# Patient Record
Sex: FEMALE | Race: WHITE | NOT HISPANIC OR LATINO | Employment: OTHER | URBAN - METROPOLITAN AREA
[De-identification: names, ages, dates, MRNs, and addresses within clinical notes are randomized per-mention and may not be internally consistent; named-entity substitution may affect disease eponyms.]

---

## 2017-04-13 ENCOUNTER — TELEPHONE (OUTPATIENT)
Dept: CARDIOLOGY | Facility: CLINIC | Age: 67
End: 2017-04-13

## 2017-04-13 DIAGNOSIS — E78.5 HYPERLIPIDEMIA LDL GOAL <100: Primary | ICD-10-CM

## 2017-04-13 NOTE — TELEPHONE ENCOUNTER
Pt called into clinic to make sure lab orders were in for her to get her f/u labs drawn after switching to Lipitor in .    Orders entered. Pt will call clinic to schedule labs at her convenience.    Fran Rios RN

## 2017-04-19 ENCOUNTER — DOCUMENTATION ONLY (OUTPATIENT)
Dept: LAB | Facility: CLINIC | Age: 67
End: 2017-04-19

## 2017-04-19 DIAGNOSIS — E78.5 HYPERLIPIDEMIA WITH TARGET LDL LESS THAN 100: Primary | ICD-10-CM

## 2017-04-19 DIAGNOSIS — E78.5 HYPERLIPIDEMIA WITH TARGET LDL LESS THAN 100: ICD-10-CM

## 2017-04-19 DIAGNOSIS — I10 HYPERTENSION GOAL BP (BLOOD PRESSURE) < 140/90: ICD-10-CM

## 2017-04-19 DIAGNOSIS — E78.5 HYPERLIPIDEMIA LDL GOAL <100: ICD-10-CM

## 2017-04-19 LAB
ALBUMIN SERPL-MCNC: 3.5 G/DL (ref 3.4–5)
ALP SERPL-CCNC: 156 U/L (ref 40–150)
ALT SERPL W P-5'-P-CCNC: 22 U/L (ref 0–50)
ANION GAP SERPL CALCULATED.3IONS-SCNC: 8 MMOL/L (ref 3–14)
AST SERPL W P-5'-P-CCNC: 16 U/L (ref 0–45)
BILIRUB DIRECT SERPL-MCNC: 0.1 MG/DL (ref 0–0.2)
BILIRUB SERPL-MCNC: 0.6 MG/DL (ref 0.2–1.3)
BUN SERPL-MCNC: 23 MG/DL (ref 7–30)
CALCIUM SERPL-MCNC: 8.9 MG/DL (ref 8.5–10.1)
CHLORIDE SERPL-SCNC: 106 MMOL/L (ref 94–109)
CHOLEST SERPL-MCNC: 144 MG/DL
CO2 SERPL-SCNC: 27 MMOL/L (ref 20–32)
CREAT SERPL-MCNC: 0.9 MG/DL (ref 0.52–1.04)
GFR SERPL CREATININE-BSD FRML MDRD: 63 ML/MIN/1.7M2
GLUCOSE SERPL-MCNC: 87 MG/DL (ref 70–99)
HDLC SERPL-MCNC: 63 MG/DL
LDLC SERPL CALC-MCNC: 66 MG/DL
NONHDLC SERPL-MCNC: 81 MG/DL
POTASSIUM SERPL-SCNC: 4 MMOL/L (ref 3.4–5.3)
PROT SERPL-MCNC: 7.3 G/DL (ref 6.8–8.8)
SODIUM SERPL-SCNC: 141 MMOL/L (ref 133–144)
TRIGL SERPL-MCNC: 77 MG/DL

## 2017-04-19 PROCEDURE — 36415 COLL VENOUS BLD VENIPUNCTURE: CPT | Performed by: INTERNAL MEDICINE

## 2017-04-19 PROCEDURE — 80076 HEPATIC FUNCTION PANEL: CPT | Performed by: INTERNAL MEDICINE

## 2017-04-19 PROCEDURE — 80048 BASIC METABOLIC PNL TOTAL CA: CPT | Performed by: INTERNAL MEDICINE

## 2017-04-19 PROCEDURE — 80061 LIPID PANEL: CPT | Performed by: INTERNAL MEDICINE

## 2017-04-19 NOTE — PROGRESS NOTES
Patient was in for a lab draw and stated that she also needed kidney functions and liver functions performed.  Only a Lipid Panel order was in her chart.  If patient needs these other tests, please place future orders.   Thank you.

## 2017-04-26 ENCOUNTER — TELEPHONE (OUTPATIENT)
Dept: CARDIOLOGY | Facility: CLINIC | Age: 67
End: 2017-04-26

## 2017-04-26 NOTE — TELEPHONE ENCOUNTER
Spoke to pt regarding her lab results. Verbalized understanding to keep everything the same per her medications.     Pt verbalized understanding. Pt also had questions regarding her BP. States that it seems to be erratic, somedays it would be normal, somedays low and other days as high as 170.     Fran Rios RN

## 2017-05-01 ENCOUNTER — OFFICE VISIT (OUTPATIENT)
Dept: FAMILY MEDICINE | Facility: CLINIC | Age: 67
End: 2017-05-01
Payer: COMMERCIAL

## 2017-05-01 VITALS
DIASTOLIC BLOOD PRESSURE: 66 MMHG | RESPIRATION RATE: 18 BRPM | WEIGHT: 196 LBS | TEMPERATURE: 99 F | OXYGEN SATURATION: 97 % | SYSTOLIC BLOOD PRESSURE: 110 MMHG | BODY MASS INDEX: 33.64 KG/M2 | HEART RATE: 96 BPM

## 2017-05-01 DIAGNOSIS — N30.00 ACUTE CYSTITIS WITHOUT HEMATURIA: ICD-10-CM

## 2017-05-01 DIAGNOSIS — R82.90 NONSPECIFIC FINDING ON EXAMINATION OF URINE: ICD-10-CM

## 2017-05-01 DIAGNOSIS — R30.0 DYSURIA: Primary | ICD-10-CM

## 2017-05-01 LAB
ALBUMIN UR-MCNC: NEGATIVE MG/DL
APPEARANCE UR: CLEAR
BACTERIA #/AREA URNS HPF: ABNORMAL /HPF
BILIRUB UR QL STRIP: NEGATIVE
COLOR UR AUTO: YELLOW
GLUCOSE UR STRIP-MCNC: NEGATIVE MG/DL
HGB UR QL STRIP: ABNORMAL
KETONES UR STRIP-MCNC: NEGATIVE MG/DL
LEUKOCYTE ESTERASE UR QL STRIP: ABNORMAL
NITRATE UR QL: NEGATIVE
NON-SQ EPI CELLS #/AREA URNS LPF: ABNORMAL /LPF
PH UR STRIP: 5.5 PH (ref 5–7)
RBC #/AREA URNS AUTO: ABNORMAL /HPF (ref 0–2)
SP GR UR STRIP: 1.01 (ref 1–1.03)
URN SPEC COLLECT METH UR: ABNORMAL
UROBILINOGEN UR STRIP-ACNC: 0.2 EU/DL (ref 0.2–1)
WBC #/AREA URNS AUTO: ABNORMAL /HPF (ref 0–2)

## 2017-05-01 PROCEDURE — 87086 URINE CULTURE/COLONY COUNT: CPT | Performed by: NURSE PRACTITIONER

## 2017-05-01 PROCEDURE — 99213 OFFICE O/P EST LOW 20 MIN: CPT | Performed by: NURSE PRACTITIONER

## 2017-05-01 PROCEDURE — 81001 URINALYSIS AUTO W/SCOPE: CPT | Performed by: NURSE PRACTITIONER

## 2017-05-01 RX ORDER — NITROFURANTOIN 25; 75 MG/1; MG/1
100 CAPSULE ORAL 2 TIMES DAILY
Qty: 14 CAPSULE | Refills: 0 | Status: SHIPPED | OUTPATIENT
Start: 2017-05-01 | End: 2017-09-28

## 2017-05-01 NOTE — NURSING NOTE
"Chief Complaint   Patient presents with     UTI       Initial /66 (BP Location: Left arm, Patient Position: Chair, Cuff Size: Adult Regular)  Pulse 96  Temp 99  F (37.2  C) (Tympanic)  Resp 18  Wt 196 lb (88.9 kg)  SpO2 97%  BMI 33.64 kg/m2 Estimated body mass index is 33.64 kg/(m^2) as calculated from the following:    Height as of 8/4/16: 5' 4\" (1.626 m).    Weight as of this encounter: 196 lb (88.9 kg).  Medication Reconciliation: complete    Health Maintenance that is potentially due pending provider review:  Fall risk, hep c screening, pneumococcal, advanced directive planning    Will discuss with provider, fall risk complete.      "

## 2017-05-01 NOTE — PATIENT INSTRUCTIONS
Urine showed infection   Antibiotic Macrobid twice a day for 1 week   OVER THE COUNTER AZO or pyridium for bladder spasm/pain - turns urine orange  Push fluids   Follow up if not improved after antibiotic  Will culture out urine- make sure antibiotic is the right one        Urinary Tract Infections in Women  Urinary tract infections (UTIs) are most often caused by bacteria (germs). These bacteria enter the urinary tract. The bacteria may come from outside the body. Or they may travel from the skin outside the rectum or vagina into the urethra. Female anatomy makes it easier for bacteria from the bowel to enter a woman s urinary tract, which is the most common source of UTI. This means women develop UTIs more often than men. Pain in or around the urinary tract is a common UTI symptom. But the only way to know for sure if you have a UTI for the health care provider to test your urine. The two tests that may be done are the urinalysis and urine culture.  Types of UTIs    Cystitis: A bladder infection (cystitis) is the most common UTI in women. You may have urgent or frequent urination. You may also have pain, burning when you urinate, and bloody urine.    Urethritis: This is an inflamed urethra, which is the tube that carries urine from the bladder to outside the body. You may have lower stomach or back pain. You may also have urgent or frequent urination.    Pyelonephritis: This is a kidney infection. If not treated, it can be serious and damage your kidneys. In severe cases, you may be hospitalized. You may have a fever and lower back pain.  Medications to treat a UTI  Most UTIs are treated with antibiotics. These kill the bacteria. The length of time you need to take them depends on the type of infection. It may be as short as 3 days. If you have repeated UTIs, a low-dose antibiotic may be needed for several months. Take antibiotics exactly as directed. Don t stop taking them until all of the medication is gone. If  you stop taking the antibiotic too soon, the infection may not go away, and you may develop a resistance to the antibiotic. This can make it much harder to treat.  Lifestyle changes to treat and prevent UTIs  The lifestyle changes below will help get rid of your UTI. They may also help prevent future UTIs.    Drink plenty of fluids. This includes water, juice, or other caffeine-free drinks. Fluids help flush bacteria out of your body.    Empty your bladder. Always empty your bladder when you feel the urge to urinate. And always urinate before going to sleep. Urine that stays in your bladder can lead to infection. Try to urinate before and after sex as well.    Practice good personal hygiene. Wipe yourself from front to back after using the toilet. This helps keep bacteria from getting into the urethra.    Use condoms during sex. These help prevent UTIs caused by sexually transmitted bacteria. Also, avoid using spermicides during sex. These can increase the risk of UTIs. Choose other forms of birth control instead. For women who tend to get UTIs after sex, a low-dose of a preventive antibiotic may be used. Be sure to discuss this option with your health care provider.    Follow up with your health care provider as directed. He or she may test to make sure the infection has cleared. If necessary, additional treatment may be started.    7073-4651 [employer]. [address] [place] [address], [place] [address]. All rights reserved. This information is not intended as a substitute for professional medical care. Always follow your healthcare professional's instructions.      FOLLOW UP AS NEEDED FOR PERSISTENT OR WORSENING SYMPTOMS

## 2017-05-01 NOTE — MR AVS SNAPSHOT
After Visit Summary   5/1/2017    Sharri Elaine    MRN: [id number]           Patient Information     Date Of Birth          1950        Visit Information        Provider Department      5/1/2017 11:00 AM Lisa Martinez NP [facility]        Today's Diagnoses     Dysuria    -  1    Nonspecific finding on examination of urine        Acute cystitis without hematuria          Care Instructions    Urine showed infection   Antibiotic Macrobid twice a day for 1 week   OVER THE COUNTER AZO or pyridium for bladder spasm/pain - turns urine orange  Push fluids   Follow up if not improved after antibiotic  Will culture out urine- make sure antibiotic is the right one        Urinary Tract Infections in Women  Urinary tract infections (UTIs) are most often caused by bacteria (germs). These bacteria enter the urinary tract. The bacteria may come from outside the body. Or they may travel from the skin outside the rectum or vagina into the urethra. Female anatomy makes it easier for bacteria from the bowel to enter a woman s urinary tract, which is the most common source of UTI. This means women develop UTIs more often than men. Pain in or around the urinary tract is a common UTI symptom. But the only way to know for sure if you have a UTI for the health care provider to test your urine. The two tests that may be done are the urinalysis and urine culture.  Types of UTIs    Cystitis: A bladder infection (cystitis) is the most common UTI in women. You may have urgent or frequent urination. You may also have pain, burning when you urinate, and bloody urine.    Urethritis: This is an inflamed urethra, which is the tube that carries urine from the bladder to outside the body. You may have lower stomach or back pain. You may also have urgent or frequent urination.    Pyelonephritis: This is a kidney infection. If not treated, it can be serious and damage your kidneys. In severe cases, you may be  hospitalized. You may have a fever and lower back pain.  Medications to treat a UTI  Most UTIs are treated with antibiotics. These kill the bacteria. The length of time you need to take them depends on the type of infection. It may be as short as 3 days. If you have repeated UTIs, a low-dose antibiotic may be needed for several months. Take antibiotics exactly as directed. Don t stop taking them until all of the medication is gone. If you stop taking the antibiotic too soon, the infection may not go away, and you may develop a resistance to the antibiotic. This can make it much harder to treat.  Lifestyle changes to treat and prevent UTIs  The lifestyle changes below will help get rid of your UTI. They may also help prevent future UTIs.    Drink plenty of fluids. This includes water, juice, or other caffeine-free drinks. Fluids help flush bacteria out of your body.    Empty your bladder. Always empty your bladder when you feel the urge to urinate. And always urinate before going to sleep. Urine that stays in your bladder can lead to infection. Try to urinate before and after sex as well.    Practice good personal hygiene. Wipe yourself from front to back after using the toilet. This helps keep bacteria from getting into the urethra.    Use condoms during sex. These help prevent UTIs caused by sexually transmitted bacteria. Also, avoid using spermicides during sex. These can increase the risk of UTIs. Choose other forms of birth control instead. For women who tend to get UTIs after sex, a low-dose of a preventive antibiotic may be used. Be sure to discuss this option with your health care provider.    Follow up with your health care provider as directed. He or she may test to make sure the infection has cleared. If necessary, additional treatment may be started.    3482-5257 [employer]. [address] [place] [address], [place] [address]. All rights reserved. This information is not intended as a substitute for  professional medical care. Always follow your healthcare professional's instructions.      FOLLOW UP AS NEEDED FOR PERSISTENT OR WORSENING SYMPTOMS         Follow-ups after your visit        Who to contact     If you have questions or need follow up information about today's clinic visit or your schedule please contact [facility] directly at [phone].  Normal or non-critical lab and imaging results will be communicated to you by MyChart, letter or phone within 4 business days after the clinic has received the results. If you do not hear from us within 7 days, please contact the clinic through [employer] or phone. If you have a critical or abnormal lab result, we will notify you by phone as soon as possible.  Submit refill requests through [employer] or call your pharmacy and they will forward the refill request to us. Please allow 3 business days for your refill to be completed.          Additional Information About Your Visit        MyChart Information     [employer] gives you secure access to your electronic health record. If you see a primary care provider, you can also send messages to your care team and make appointments. If you have questions, please call your primary care clinic.  If you do not have a primary care provider, please call [phone] and they will assist you.        Care EveryWhere ID     This is your Care EveryWhere ID. This could be used by other organizations to access your [facility] medical records  [id number]        Your Vitals Were     Pulse Temperature Respirations Pulse Oximetry BMI (Body Mass Index)       96 99  F (37.2  C) (Tympanic) 18 97% 33.64 kg/m2        Blood Pressure from Last 3 Encounters:   05/01/17 110/66   10/19/16 129/62   08/04/16 126/64    Weight from Last 3 Encounters:   05/01/17 196 lb (88.9 kg)   10/19/16 200 lb (90.7 kg)   08/04/16 195 lb 12.8 oz (88.8 kg)              We Performed the Following     *UA reflex to Microscopic and Culture ([place] and  [facility] (except [place] and [place])     Urine Culture Aerobic Bacterial     Urine Microscopic          Today's Medication Changes          These changes are accurate as of: 5/1/17  3:05 PM.  If you have any questions, ask your nurse or doctor.               Start taking these medicines.        Dose/Directions    nitrofurantoin (macrocrystal-monohydrate) 100 MG capsule   Commonly known as:  MACROBID   Used for:  Acute cystitis without hematuria   Started by:  Lisa Martinez NP        Dose:  100 mg   Take 1 capsule (100 mg) by mouth 2 times daily   Quantity:  14 capsule   Refills:  0            Where to get your medicines      These medications were sent to [facility] [id number] [address]     Phone:  [phone]     nitrofurantoin (macrocrystal-monohydrate) 100 MG capsule                Primary Care Provider Office Phone # Fax #    Kervin Michael Lin [phone]       [facility] [address] [facility] [address]        Thank you!     Thank you for choosing [facility]  for your care. Our goal is always to provide you with excellent care. Hearing back from our patients is one way we can continue to improve our services. Please take a few minutes to complete the written survey that you may receive in the mail after your visit with us. Thank you!             Your Updated Medication List - Protect others around you: Learn how to safely use, store and throw away your medicines at www.disposemymeds.org.          This list is accurate as of: 5/1/17  3:05 PM.  Always use your most recent med list.                   Brand Name Dispense Instructions for use    aspirin 81 MG tablet      Take 81 mg by mouth daily       atorvastatin 40 MG tablet    LIPITOR    90 tablet    Take 1 tablet (40 mg) by mouth daily       carvedilol 12.5 MG tablet    COREG    180 tablet    Take 1 tablet  (12.5 mg) by mouth 2 times daily (with meals)       diphenhydrAMINE-acetaminophen  MG tablet    TYLENOL PM     Take 1 tablet by mouth At Bedtime.       losartan 25 MG tablet    COZAAR    180 tablet    Take one pill twice daily       nitrofurantoin (macrocrystal-monohydrate) 100 MG capsule    MACROBID    14 capsule    Take 1 capsule (100 mg) by mouth 2 times daily       nitroglycerin 0.4 MG sublingual tablet    NITROSTAT    25 tablet    Place 1 tablet (0.4 mg) under the tongue every 5 minutes as needed for chest pain       order for DME     1 Device    Blood pressure cuff       ZANTAC PO

## 2017-05-01 NOTE — PROGRESS NOTES
SUBJECTIVE:                                                    Sharri Elaine is a 66 year old female who presents to clinic today for the following health issues:      URINARY TRACT SYMPTOMS      Duration: 3 days    Description  dysuria, back pain and fatigued, fever and chills    Intensity:  moderate    Accompanying signs and symptoms:  Fever/chills: YES  Flank pain YES  Nausea and vomiting: no   Vaginal symptoms: none  Abdominal/Pelvic Pain: YES    History  History of frequent UTI's: no   History of kidney stones: no   Sexually Active: no   Possibility of pregnancy: No    Precipitating or alleviating factors: None    Therapies tried and outcome: took some old pills, does not remember what they were, Cranberry juice   Outcome: The pills did not work, cranberry juice didn't seem to help       Believes that she had a fever on Saturday- did not check it   Had body aches on Thursday   Urinary symptoms started Saturday have continued since     This morning lower back started aching     No history of recurrent UTIs  Has a a few in the past       Problem list and histories reviewed & adjusted, as indicated.  Additional history: as documented    Patient Active Problem List   Diagnosis     Preinfarction syndrome (H)     Hypertension goal BP (blood pressure) < 140/90     Hyperlipidemia with target LDL less than 100     CAD (coronary artery disease)     [facility]     Advanced directives, counseling/discussion     Chest pain     Acute chest pain     Past Surgical History:   Procedure Laterality Date     ANGIOGRAM  6/2010     angiogram  11/3/2010    in stent restenosis     PVD STENTING  2010     x 2, LAD       Social History   Substance Use Topics     Smoking status: Never Smoker     Smokeless tobacco: Never Used      Comment: never smoker; non-smoking household     Alcohol use Yes      Comment: rare     Family History   Problem Relation Age of Onset     Hypertension Mother      HEART DISEASE Mother       CHF     Alzheimer Disease Father      Hypertension Daughter      High cholesterol Daughter      Breast Cancer No family hx of            Reviewed and updated as needed this visit by clinical staff  Tobacco  Allergies  Med Hx  Surg Hx  Fam Hx  Soc Hx      Reviewed and updated as needed this visit by Provider         ROS:  Constitutional, HEENT, cardiovascular, pulmonary, gi and gu systems are negative, except as otherwise noted.    OBJECTIVE:                                                    /66 (BP Location: Left arm, Patient Position: Chair, Cuff Size: Adult Regular)  Pulse 96  Temp 99  F (37.2  C) (Tympanic)  Resp 18  Wt 196 lb (88.9 kg)  SpO2 97%  BMI 33.64 kg/m2  Body mass index is 33.64 kg/(m^2).  GENERAL APPEARANCE: healthy, alert and no distress  HENT: ear canals and TM's normal and nose and mouth without ulcers or lesions  RESP: lungs clear to auscultation - no rales, rhonchi or wheezes  CV: regular rates and rhythm, normal S1 S2, no S3 or S4 and no murmur, click or rub  ABDOMEN: soft, nontender, without hepatosplenomegaly or masses and bowel sounds normal    Diagnostic test results:  Diagnostic Test Results:  Results for orders placed or performed in visit on 05/01/17   *UA reflex to Microscopic and Culture ([place] and [facility] (except [place] and [place])   Result Value Ref [facility]    Color Urine Yellow     Appearance Urine Clear     Glucose Urine Negative NEG mg/dL    Bilirubin Urine Negative NEG    Ketones Urine Negative NEG mg/dL    Specific Gravity Urine 1.010 1.003 - 1.035    Blood Urine Small (A) NEG    pH Urine 5.5 5.0 - 7.0 pH    Protein Albumin Urine Negative NEG mg/dL    Urobilinogen Urine 0.2 0.2 - 1.0 EU/dL    Nitrite Urine Negative NEG    Leukocyte Esterase Urine Moderate (A) NEG    Source Midstream Urine    Urine Microscopic   Result Value Ref [facility]    WBC Urine 25-50 (A) 0 - 2 /HPF    RBC Urine 5-10 (A) 0 - 2 /HPF    Squamous Epithelial /LPF Urine Moderate (A) FEW  /LPF    Bacteria Urine Moderate (A) NEG /HPF        ASSESSMENT/PLAN:                                                    1. Dysuria  - *UA reflex to Microscopic and Culture ([place] and [facility] (except [place] and [place])  - Urine Microscopic    2. Nonspecific finding on examination of urine  - Urine Culture Aerobic Bacterial    3. Acute cystitis without hematuria  - nitrofurantoin, macrocrystal-monohydrate, (MACROBID) 100 MG capsule; Take 1 capsule (100 mg) by mouth 2 times daily  Dispense: 14 capsule; Refill: 0    Patient Instructions   Urine showed infection   Antibiotic Macrobid twice a day for 1 week   OVER THE COUNTER AZO or pyridium for bladder spasm/pain - turns urine orange  Push fluids   Follow up if not improved after antibiotic  Will culture out urine- make sure antibiotic is the right one        Urinary Tract Infections in Women  Urinary tract infections (UTIs) are most often caused by bacteria (germs). These bacteria enter the urinary tract. The bacteria may come from outside the body. Or they may travel from the skin outside the rectum or vagina into the urethra. Female anatomy makes it easier for bacteria from the bowel to enter a woman s urinary tract, which is the most common source of UTI. This means women develop UTIs more often than men. Pain in or around the urinary tract is a common UTI symptom. But the only way to know for sure if you have a UTI for the health care provider to test your urine. The two tests that may be done are the urinalysis and urine culture.  Types of UTIs    Cystitis: A bladder infection (cystitis) is the most common UTI in women. You may have urgent or frequent urination. You may also have pain, burning when you urinate, and bloody urine.    Urethritis: This is an inflamed urethra, which is the tube that carries urine from the bladder to outside the body. You may have lower stomach or back pain. You may also have urgent or frequent  urination.    Pyelonephritis: This is a kidney infection. If not treated, it can be serious and damage your kidneys. In severe cases, you may be hospitalized. You may have a fever and lower back pain.  Medications to treat a UTI  Most UTIs are treated with antibiotics. These kill the bacteria. The length of time you need to take them depends on the type of infection. It may be as short as 3 days. If you have repeated UTIs, a low-dose antibiotic may be needed for several months. Take antibiotics exactly as directed. Don t stop taking them until all of the medication is gone. If you stop taking the antibiotic too soon, the infection may not go away, and you may develop a resistance to the antibiotic. This can make it much harder to treat.  Lifestyle changes to treat and prevent UTIs  The lifestyle changes below will help get rid of your UTI. They may also help prevent future UTIs.    Drink plenty of fluids. This includes water, juice, or other caffeine-free drinks. Fluids help flush bacteria out of your body.    Empty your bladder. Always empty your bladder when you feel the urge to urinate. And always urinate before going to sleep. Urine that stays in your bladder can lead to infection. Try to urinate before and after sex as well.    Practice good personal hygiene. Wipe yourself from front to back after using the toilet. This helps keep bacteria from getting into the urethra.    Use condoms during sex. These help prevent UTIs caused by sexually transmitted bacteria. Also, avoid using spermicides during sex. These can increase the risk of UTIs. Choose other forms of birth control instead. For women who tend to get UTIs after sex, a low-dose of a preventive antibiotic may be used. Be sure to discuss this option with your health care provider.    Follow up with your health care provider as directed. He or she may test to make sure the infection has cleared. If necessary, additional treatment may be started.    7744-7856  [employer]. [address] [place] [address], [place] [address]. All rights reserved. This information is not intended as a substitute for professional medical care. Always follow your healthcare professional's instructions.      FOLLOW UP AS NEEDED FOR PERSISTENT OR WORSENING SYMPTOMS       Lisa Martinez NP  [facility]

## 2017-05-03 ENCOUNTER — TELEPHONE (OUTPATIENT)
Dept: FAMILY MEDICINE | Facility: CLINIC | Age: 67
End: 2017-05-03

## 2017-05-03 DIAGNOSIS — R30.0 DYSURIA: Primary | ICD-10-CM

## 2017-05-03 RX ORDER — CIPROFLOXACIN 500 MG/1
500 TABLET, FILM COATED ORAL 2 TIMES DAILY
Qty: 10 TABLET | Refills: 0 | Status: SHIPPED | OUTPATIENT
Start: 2017-05-03 | End: 2017-05-08

## 2017-05-03 NOTE — TELEPHONE ENCOUNTER
"Pt was in on 5-1-17 for UTI was given Macrobid, UC is not back yet, pt reported today \" I have such bad diarrhea from the Macrobid, so I quit taking it, I need something else \" I did discuss with her that antibiotics are harsh on the GI system and they can cause diarrhea, I did reiterate to the pt, it would be best if we can wait for UC result to assist in determining which antibiotic, if any to change to.  I informed her to also make sure she is drinking plenty of water, will forward to Dr Lin to review and await UC result.  Che Zaidi RN    Patient Instructions   Urine showed infection   Antibiotic Macrobid twice a day for 1 week   OVER THE COUNTER AZO or pyridium for bladder spasm/pain - turns urine orange  Push fluids   Follow up if not improved after antibiotic  Will culture out urine- make sure antibiotic is the right one    "

## 2017-05-03 NOTE — TELEPHONE ENCOUNTER
Patient was seen on Monday for a UTI and was given medication. She is calling today as the medication is NOT helping. She states she was seen in [place] a few years ago and that whatever she got prescribed, worked well. Please advise.    Tina [facility]

## 2017-05-04 NOTE — PROGRESS NOTES
Patient discontinued nitrofurantoin due to side effects. She was treated with ciprofloxacin about one and a half year ago and did not experience any side effects. Ciprofloxacin ordered and suggested to continue well hydration. Urine culture to follow. All questions answered.       Kervin Lin MD  [facility]

## 2017-05-05 LAB
BACTERIA SPEC CULT: NORMAL
MICRO REPORT STATUS: NORMAL
SPECIMEN SOURCE: NORMAL

## 2017-09-27 ENCOUNTER — PRE VISIT (OUTPATIENT)
Dept: CARDIOLOGY | Facility: CLINIC | Age: 67
End: 2017-09-27

## 2017-09-27 NOTE — TELEPHONE ENCOUNTER
Yearly follow up for HTN, CAD, Dyslipidemia. Lipids last checked 4/19/17.    Chart prep complete. All requested testing/labs completed.  Karla Fenton CMA.

## 2017-09-28 ENCOUNTER — OFFICE VISIT (OUTPATIENT)
Dept: CARDIOLOGY | Facility: CLINIC | Age: 67
End: 2017-09-28
Payer: COMMERCIAL

## 2017-09-28 VITALS — HEART RATE: 68 BPM | DIASTOLIC BLOOD PRESSURE: 87 MMHG | OXYGEN SATURATION: 99 % | SYSTOLIC BLOOD PRESSURE: 163 MMHG

## 2017-09-28 DIAGNOSIS — E78.5 HYPERLIPIDEMIA WITH TARGET LDL LESS THAN 100: ICD-10-CM

## 2017-09-28 DIAGNOSIS — Z23 NEED FOR PROPHYLACTIC VACCINATION AND INOCULATION AGAINST INFLUENZA: Primary | ICD-10-CM

## 2017-09-28 PROCEDURE — G0008 ADMIN INFLUENZA VIRUS VAC: HCPCS | Performed by: INTERNAL MEDICINE

## 2017-09-28 PROCEDURE — 90662 IIV NO PRSV INCREASED AG IM: CPT | Performed by: INTERNAL MEDICINE

## 2017-09-28 PROCEDURE — 99214 OFFICE O/P EST MOD 30 MIN: CPT | Mod: 25 | Performed by: INTERNAL MEDICINE

## 2017-09-28 RX ORDER — ATORVASTATIN CALCIUM 40 MG/1
40 TABLET, FILM COATED ORAL DAILY
Qty: 90 TABLET | Refills: 3 | Status: SHIPPED | OUTPATIENT
Start: 2017-09-28 | End: 2018-09-13

## 2017-09-28 RX ORDER — LOSARTAN POTASSIUM 25 MG/1
TABLET ORAL
Qty: 180 TABLET | Refills: 3 | Status: SHIPPED | OUTPATIENT
Start: 2017-09-28 | End: 2018-09-13

## 2017-09-28 RX ORDER — CARVEDILOL 12.5 MG/1
12.5 TABLET ORAL 2 TIMES DAILY WITH MEALS
Qty: 180 TABLET | Refills: 3 | Status: SHIPPED | OUTPATIENT
Start: 2017-09-28 | End: 2018-09-13

## 2017-09-28 RX ORDER — NITROGLYCERIN 0.4 MG/1
0.4 TABLET SUBLINGUAL EVERY 5 MIN PRN
Qty: 25 TABLET | Refills: 1 | Status: SHIPPED | OUTPATIENT
Start: 2017-09-28 | End: 2018-09-13

## 2017-09-28 ASSESSMENT — PAIN SCALES - GENERAL: PAINLEVEL: NO PAIN (0)

## 2017-09-28 NOTE — NURSING NOTE
"Chief Complaint   Patient presents with     RECHECK     Yearly follow up for HTN, CAD, Dyslipidemia. Lipids last checked 4/19/17. Reports still getting a tightness in her chest with stress,  occ dizziness when not drinking enough water, denies any fluttering, sob, or abnormal fatigue.       Initial /87 (BP Location: Left arm, Patient Position: Chair, Cuff Size: Adult Regular)  Pulse 68  SpO2 99% Estimated body mass index is 33.64 kg/(m^2) as calculated from the following:    Height as of 8/4/16: 5' 4\" (1.626 m).    Weight as of 5/1/17: 196 lb (88.9 kg)..  BP completed using cuff size: regular    Karla Fenton CMA    "

## 2017-09-28 NOTE — PROGRESS NOTES
SUBJECTIVE:  Sharri Elaine is a 67 year old female who presents for follow up.  Had pLAD stent at [facility] in 6/2010 and repeat stent for ISR in 10/2010.Since then weekly chest tightness at rest. Takes 1 S/L NTG and pain goes away in 20 minutes or so. No exertional symptoms. Had normal stress MPI for same symptoms in 6.2016. No new symptoms.  Had shingles.    Patient Active Problem List    Diagnosis Date Noted     Health Care Home 06/28/2011     Priority: High     DX V65.8 REPLACED WITH 36718 HEALTH CARE HOME (04/08/2013)       Chest pain 05/12/2016     Priority: Medium     Acute chest pain 05/12/2016     Priority: Medium     Advanced directives, counseling/discussion 05/04/2012     Priority: Medium     Advance Directive received and scanned. Click on Code in the patient header to view. 5/4/2012          Rosacea 08/20/2010     Priority: Medium     Hypertension goal BP (blood pressure) < 140/90 07/14/2010     Priority: Medium     Goal <130/80       Hyperlipidemia with target LDL less than 100 07/14/2010     Priority: Medium     January 4, 2011 - . I have asked this patient to obtain a fasting lipid profile tomorrow in Ghada Bermeo's office. She is complaining of some muscle weakness and/or achiness, especially in her legs. Perhaps simvastatin at 40 mg will need to be changed to something more potent such as Crestor. If she is having myalgias or myopathy, a change to a water soluble statin would be in order.   Diagnosis updated by automated process. Provider to review and confirm.       CAD (coronary artery disease)      Priority: Medium     Status post LAD stenting 06/28 for unstable angina. She had in-stent restenosis in the LAD with further stenting on 11/03/2010 when she developed recurrent angina and was hospitalized at [facility]. Coronary angiography demonstrated a 60% circumflex ostial stenosis, a 95% proximal LAD in-stent restenosis along with a 50% ostial stenosis that was unchanged. The  right coronary artery had a mild 10%-20% luminal narrowing but nothing significant. Her ejection fraction at that time was 45%. The in-stent restenosis was restented with an [place] drug-eluting stent.   Plavix was changed to Effient and the cardiologist, Dr. Torrez, felt that Effient should be continued at least 2 years or preferentially longer term. Nuclear stress test late summer 2011. We will consider a stress test earlier than that should the patient have symptoms.    Follow up with Cardiology in 6 months        Preinfarction syndrome (H)      Priority: Medium     (Problem list name updated by automated process. Provider to review and confirm.)      .  Current Outpatient Prescriptions   Medication Sig     atorvastatin (LIPITOR) 40 MG tablet Take 1 tablet (40 mg) by mouth daily     carvedilol (COREG) 12.5 MG tablet Take 1 tablet (12.5 mg) by mouth 2 times daily (with meals)     losartan (COZAAR) 25 MG tablet Take one pill twice daily     nitroGLYcerin (NITROSTAT) 0.4 MG sublingual tablet Place 1 tablet (0.4 mg) under the tongue every 5 minutes as needed for chest pain     RaNITidine HCl (ZANTAC PO)      aspirin 81 MG tablet Take 81 mg by mouth daily     diphenhydrAMINE-acetaminophen (TYLENOL PM)  MG tablet Take 1 tablet by mouth At Bedtime.     ORDER FOR DME Blood pressure cuff     [DISCONTINUED] atorvastatin (LIPITOR) 40 MG tablet Take 1 tablet (40 mg) by mouth daily     [DISCONTINUED] nitroglycerin (NITROSTAT) 0.4 MG SL tablet Place 1 tablet (0.4 mg) under the tongue every 5 minutes as needed for chest pain     [DISCONTINUED] carvedilol (COREG) 12.5 MG tablet Take 1 tablet (12.5 mg) by mouth 2 times daily (with meals)     [DISCONTINUED] losartan (COZAAR) 25 MG tablet Take one pill twice daily     No current facility-administered medications for this visit.      Past Medical History:   Diagnosis Date     Acne rosacea      Anginas, unstable 2010     CAD (coronary artery disease) 2010    LAD stent x 2      Fibrocystic breast      Hepatitis     age 18     Iritis     14 YEARS OLD     Menopause     AN HRT     MI (myocardial infarction) (H)     non T wave MI     NONSPECIFIC MEDICAL HISTORY 7/14/09    HEART MURMUR- NORMAL ECHO     Shingles 05/2017     Past Surgical History:   Procedure Laterality Date     ANGIOGRAM  6/2010     angiogram  11/3/2010    in stent restenosis     PVD STENTING  2010     x 2, LAD     Allergies   Allergen Reactions     Sulfa Drugs Rash     Social History     Social History     Marital status:      Spouse name: N/A     Number of children: N/A     Years of education: N/A     Occupational History     Not on file.     Social History Main Topics     Smoking status: Never Smoker     Smokeless tobacco: Never Used      Comment: never smoker; non-smoking household     Alcohol use Yes      Comment: rare     Drug use: No      Comment: never     Sexual activity: Yes     Partners: Male     Other Topics Concern     Not on file     Social History Narrative     Family History   Problem Relation Age of Onset     Hypertension Mother      HEART DISEASE Mother      CHF     Alzheimer Disease Father      Hypertension Daughter      High cholesterol Daughter      Breast Cancer No family hx of           REVIEW OF SYSTEMS:  General: negative, fever, chills, night sweats  Skin: negative, acne, rash and scaling  Eyes: negative, double vision, eye pain and photophobia  Ears/Nose/Throat: negative, nasal congestion and purulent rhinorrhea  Respiratory: No dyspnea on exertion, No cough, No hemoptysis and negative  Cardiovascular: negative, palpitations, tachycardia, irregular heart beat, exertional chest pain or pressure, paroxysmal nocturnal dyspnea, dyspnea on exertion and orthopnea       OBJECTIVE:  Blood pressure 163/87, pulse 68, SpO2 99 %, not currently breastfeeding.  General Appearance: alert, active and no distress  Head: Normocephalic. No masses, lesions, tenderness or abnormalities  Eyes: conjuctiva clear,  PERRL, EOM intact  Ears: External ears normal. Canals clear. TM's normal.  Nose: Nares normal  Mouth: normal  Neck: Supple, no cervical adenopathy, no thyromegaly  Lungs: clear to auscultation  Cardiac: regular rate and rhythm, normal S1 and S2, no murmur         ASSESSMENT/PLAN:.  S/P PCI of pLADx2. Currently atypical chest pain. Normal stress MPI.  No new symptoms.  Will continue current meds today.  BP elevated today. Did not take AM pills. Will check CP and call If high.  Per orders.   Return to Clinic 1yr.

## 2017-09-28 NOTE — MR AVS SNAPSHOT
After Visit Summary   9/28/2017    Sharri Elaine    MRN: [id number]           Patient Information     Date Of Birth          1950        Visit Information        Provider Department      9/28/2017 11:30 AM LUCA Gillespie MD [facility] PHYSICIANS HEART AT [facility]        Today's Diagnoses     Hyperlipidemia with target LDL less than 100        CAD (coronary artery disease)          Care Instructions    1.  Please take your blood pressures daily for the next few weeks. Follow up with us thereafter to see if medication changes are needed.    2. All your cardiac medications have been refilled.    3. Dr. DAVIDE Torres would like you to return for a cardiac follow up in 1 year  (September 2018).  We will contact you regarding your appointment when the time draws closer or you may call [phone] to arrange an appointment.  Mean while, if you should have any questions or concerns regarding your heart health, please contact us.  Thank you for choosing [facility] for your care.    4. You were given a flu shot today.    [facility] Cardiology - [place] Location    If you have any questions regarding to your visit please contact your care team:     Cardiology  Telephone Number   Fran Jacobsen  Cardiology RN's.    Kyung Fenton CMA [phone]    After hours: [phone].  [phone]   For scheduling appts:     [phone] or  [phone]    After hours: [phone]   For the Device Clinic (Pacemakers and ICD's)  RN's :  Nae Hodge   During business hours: [phone]  After business hours:  [phone]- select option 4.      If you need a medication refill please contact your pharmacy.  Please allow 3 business days for your refill to be completed.    As always, Thank you for trusting us with your health care needs!  _____________________________________________________________________              Follow-ups after your visit        Who to  contact     If you have questions or need follow up information about today's clinic visit or your schedule please contact [facility] PHYSICIANS HEART AT [facility] directly at [phone].  Normal or non-critical lab and imaging results will be communicated to you by MyChart, letter or phone within 4 business days after the clinic has received the results. If you do not hear from us within 7 days, please contact the clinic through MyChart or phone. If you have a critical or abnormal lab result, we will notify you by phone as soon as possible.  Submit refill requests through [employer] or call your pharmacy and they will forward the refill request to us. Please allow 3 business days for your refill to be completed.          Additional Information About Your Visit        [employer] Information     [employer] gives you secure access to your electronic health record. If you see a primary care provider, you can also send messages to your care team and make appointments. If you have questions, please call your primary care clinic.  If you do not have a primary care provider, please call [phone] and they will assist you.        Care EveryWhere ID     This is your Care EveryWhere ID. This could be used by other organizations to access your [place] medical records  [id number]        Your Vitals Were     Pulse Pulse Oximetry                68 99%           Blood Pressure from Last 3 Encounters:   09/28/17 163/87   05/01/17 110/66   10/19/16 129/62    Weight from Last 3 Encounters:   05/01/17 88.9 kg (196 lb)   10/19/16 90.7 kg (200 lb)   08/04/16 88.8 kg (195 lb 12.8 oz)              Today, you had the following     No orders found for display         Where to get your medicines      These medications were sent to [facility] - [place], [place] - [email or web address]   [address] , [facility] [address]    Hours:  Mon-Fri 8:30am-5:00pm Toll Free [phone] Phone:   [phone]     atorvastatin 40 MG tablet    carvedilol 12.5 MG tablet    losartan 25 MG tablet    nitroGLYcerin 0.4 MG sublingual tablet          Primary Care Provider Office Phone # Fax #    Kervin Rodriguez MD [place] [phone]       [address] [facility] [address]        Equal Access to Services     MICHELLE GAITAN : Hadii aad ku hadasho Soomaali, waaxda luqadaha, qaybta kaalmada adeegyada, waxay idiin hayaan adeeg khsylviaemilee laefrem . So [facility] [phone].    ATENCIÓN: Si habla español, tiene a odom disposición servicios gratuitos de asistencia lingüística. Jazmin al [phone].    We comply with applicable federal civil rights laws and [place] laws. We do not discriminate on the basis of race, color, national origin, age, disability sex, sexual orientation or gender identity.            Thank you!     Thank you for choosing [facility] PHYSICIANS HEART AT [facility]  for your care. Our goal is always to provide you with excellent care. Hearing back from our patients is one way we can continue to improve our services. Please take a few minutes to complete the written survey that you may receive in the mail after your visit with us. Thank you!             Your Updated Medication List - Protect others around you: Learn how to safely use, store and throw away your medicines at www.disposemymeds.org.          This list is accurate as of: 9/28/17 11:55 AM.  Always use your most recent med list.                   Brand Name Dispense Instructions for use Diagnosis    aspirin 81 MG tablet      Take 81 mg by mouth daily        atorvastatin 40 MG tablet    LIPITOR    90 tablet    Take 1 tablet (40 mg) by mouth daily    Hyperlipidemia with target LDL less than 100       carvedilol 12.5 MG tablet    COREG    180 tablet    Take 1 tablet (12.5 mg) by mouth 2 times daily (with meals)    CAD (coronary artery disease)       diphenhydrAMINE-acetaminophen  MG tablet    TYLENOL PM     Take 1 tablet  by mouth At Bedtime.        losartan 25 MG tablet    COZAAR    180 tablet    Take one pill twice daily    CAD (coronary artery disease)       nitroGLYcerin 0.4 MG sublingual tablet    NITROSTAT    25 tablet    Place 1 tablet (0.4 mg) under the tongue every 5 minutes as needed for chest pain    CAD (coronary artery disease)       order for DME     1 Device    Blood pressure cuff    HTN (hypertension)       ZANTAC PO

## 2017-09-28 NOTE — LETTER
9/28/2017      RE: Sharri Elaine  [address] [facility] [address]       Dear Colleague,    Thank you for the opportunity to participate in the care of your patient, Sharri Elaine, at the [facility] PHYSICIANS HEART AT [facility] at [facility]. Please see a copy of my visit note below.       SUBJECTIVE:  Sharri Elaine is a 67 year old female who presents for follow up.  Had pLAD stent at [facility] in 6/2010 and repeat stent for ISR in 10/2010.Since then weekly chest tightness at rest. Takes 1 S/L NTG and pain goes away in 20 minutes or so. No exertional symptoms. Had normal stress MPI for same symptoms in 6.2016. No new symptoms.  Had shingles.    Patient Active Problem List    Diagnosis Date Noted     Health Care Home 06/28/2011     Priority: High     DX V65.8 REPLACED WITH 96412 HEALTH CARE HOME (04/08/2013)       Chest pain 05/12/2016     Priority: Medium     Acute chest pain 05/12/2016     Priority: Medium     Advanced directives, counseling/discussion 05/04/2012     Priority: Medium     Advance Directive received and scanned. Click on Code in the patient header to view. 5/4/2012          Rosacea 08/20/2010     Priority: Medium     Hypertension goal BP (blood pressure) < 140/90 07/14/2010     Priority: Medium     Goal <130/80       Hyperlipidemia with target LDL less than 100 07/14/2010     Priority: Medium     January 4, 2011 - . I have asked this patient to obtain a fasting lipid profile tomorrow in Ghada Bermeo's office. She is complaining of some muscle weakness and/or achiness, especially in her legs. Perhaps simvastatin at 40 mg will need to be changed to something more potent such as Crestor. If she is having myalgias or myopathy, a change to a water soluble statin would be in order.   Diagnosis updated by automated process. Provider to review and confirm.       CAD (coronary artery disease)      Priority: Medium     Status post LAD  stenting 06/28 for unstable angina. She had in-stent restenosis in the LAD with further stenting on 11/03/2010 when she developed recurrent angina and was hospitalized at [facility]. Coronary angiography demonstrated a 60% circumflex ostial stenosis, a 95% proximal LAD in-stent restenosis along with a 50% ostial stenosis that was unchanged. The right coronary artery had a mild 10%-20% luminal narrowing but nothing significant. Her ejection fraction at that time was 45%. The in-stent restenosis was restented with an [place] drug-eluting stent.   Plavix was changed to Effient and the cardiologist, Dr. Torrez, felt that Effient should be continued at least 2 years or preferentially longer term. Nuclear stress test late summer 2011. We will consider a stress test earlier than that should the patient have symptoms.    Follow up with Cardiology in 6 months        Preinfarction syndrome (H)      Priority: Medium     (Problem list name updated by automated process. Provider to review and confirm.)      .  Current Outpatient Prescriptions   Medication Sig     atorvastatin (LIPITOR) 40 MG tablet Take 1 tablet (40 mg) by mouth daily     carvedilol (COREG) 12.5 MG tablet Take 1 tablet (12.5 mg) by mouth 2 times daily (with meals)     losartan (COZAAR) 25 MG tablet Take one pill twice daily     nitroGLYcerin (NITROSTAT) 0.4 MG sublingual tablet Place 1 tablet (0.4 mg) under the tongue every 5 minutes as needed for chest pain     RaNITidine HCl (ZANTAC PO)      aspirin 81 MG tablet Take 81 mg by mouth daily     diphenhydrAMINE-acetaminophen (TYLENOL PM)  MG tablet Take 1 tablet by mouth At Bedtime.     ORDER FOR DME Blood pressure cuff     [DISCONTINUED] atorvastatin (LIPITOR) 40 MG tablet Take 1 tablet (40 mg) by mouth daily     [DISCONTINUED] nitroglycerin (NITROSTAT) 0.4 MG SL tablet Place 1 tablet (0.4 mg) under the tongue every 5 minutes as needed for chest pain     [DISCONTINUED] carvedilol (COREG)  12.5 MG tablet Take 1 tablet (12.5 mg) by mouth 2 times daily (with meals)     [DISCONTINUED] losartan (COZAAR) 25 MG tablet Take one pill twice daily     No current facility-administered medications for this visit.      Past Medical History:   Diagnosis Date     Acne rosacea      Anginas, unstable 2010     CAD (coronary artery disease) 2010    LAD stent x 2     Fibrocystic breast      Hepatitis     age 18     Iritis     14 YEARS OLD     Menopause     AN HRT     MI (myocardial infarction) (H)     non T wave MI     NONSPECIFIC MEDICAL HISTORY 7/14/09    HEART MURMUR- NORMAL ECHO     Shingles 05/2017     Past Surgical History:   Procedure Laterality Date     ANGIOGRAM  6/2010     angiogram  11/3/2010    in stent restenosis     PVD STENTING  2010     x 2, LAD     Allergies   Allergen Reactions     Sulfa Drugs Rash     Social History     Social History     Marital status:      Spouse name: N/A     Number of children: N/A     Years of education: N/A     Occupational History     Not on file.     Social History Main Topics     Smoking status: Never Smoker     Smokeless tobacco: Never Used      Comment: never smoker; non-smoking household     Alcohol use Yes      Comment: rare     Drug use: No      Comment: never     Sexual activity: Yes     Partners: Male     Other Topics Concern     Not on file     Social History Narrative     Family History   Problem Relation Age of Onset     Hypertension Mother      HEART DISEASE Mother      CHF     Alzheimer Disease Father      Hypertension Daughter      High cholesterol Daughter      Breast Cancer No family hx of           REVIEW OF SYSTEMS:  General: negative, fever, chills, night sweats  Skin: negative, acne, rash and scaling  Eyes: negative, double vision, eye pain and photophobia  Ears/Nose/Throat: negative, nasal congestion and purulent rhinorrhea  Respiratory: No dyspnea on exertion, No cough, No hemoptysis and negative  Cardiovascular: negative, palpitations,  tachycardia, irregular heart beat, exertional chest pain or pressure, paroxysmal nocturnal dyspnea, dyspnea on exertion and orthopnea       OBJECTIVE:  Blood pressure 163/87, pulse 68, SpO2 99 %, not currently breastfeeding.  General Appearance: alert, active and no distress  Head: Normocephalic. No masses, lesions, tenderness or abnormalities  Eyes: conjuctiva clear, PERRL, EOM intact  Ears: External ears normal. Canals clear. TM's normal.  Nose: Nares normal  Mouth: normal  Neck: Supple, no cervical adenopathy, no thyromegaly  Lungs: clear to auscultation  Cardiac: regular rate and rhythm, normal S1 and S2, no murmur         ASSESSMENT/PLAN:.  S/P PCI of pLADx2. Currently atypical chest pain. Normal stress MPI.  No new symptoms.  Will continue current meds today.  BP elevated today. Did not take AM pills. Will check CP and call If high.  Per orders.   Return to Clinic 1yr.      Injectable Influenza Immunization Documentation    1.  Is the person to be vaccinated sick today?  No    2. Does the person to be vaccinated have an allergy to a component   of the vaccine?   No    3. Has the person to be vaccinated ever had a serious reaction   to influenza vaccine in the past?   No    4. Has the person to be vaccinated ever had Guillain-Barré syndrome?   No    Patient was verbally asked questions prior to flu shot today.  Karla Fenton CMA.               Please do not hesitate to contact me if you have any questions/concerns.     Sincerely,     LUCA Gillespie MD

## 2017-09-28 NOTE — NURSING NOTE
Med Reconcile: Reviewed and verified all current medications with the patient. The updated medication list was printed and given to the patient.    Pt to take BP for 2 weeks and f/u with pt to see if medication changes are needed.    Return Appointment: Patient given instructions regarding scheduling next clinic visit, in 1 year (September 2018). Patient demonstrated understanding of this information and agreed to call with further questions or concerns.    Patient stated she understood all health information given and agreed to call with further questions or concerns.    Fran Rios RN

## 2017-09-28 NOTE — PATIENT INSTRUCTIONS
1.  Please take your blood pressures daily for the next few weeks. Follow up with us thereafter to see if medication changes are needed.    2. All your cardiac medications have been refilled.    3. Dr. DAVIDE Torres would like you to return for a cardiac follow up in 1 year  (September 2018).  We will contact you regarding your appointment when the time draws closer or you may call [phone] to arrange an appointment.  Mean while, if you should have any questions or concerns regarding your heart health, please contact us.  Thank you for choosing [facility] for your care.    4. You were given a flu shot today.    [facility] Cardiology - [place] Location    If you have any questions regarding to your visit please contact your care team:     Cardiology  Telephone Number   Fran Jacobsen  Cardiology RN's.    Kyung Fenton CMA [phone]    After hours: [phone].  [phone]   For scheduling appts:     [phone] or  [phone]    After hours: [phone]   For the Device Clinic (Pacemakers and ICD's)  RN's :  Nae Hodge   During business hours: [phone]  After business hours:  [phone]- select option 4.      If you need a medication refill please contact your pharmacy.  Please allow 3 business days for your refill to be completed.    As always, Thank you for trusting us with your health care needs!  _____________________________________________________________________

## 2017-10-16 ENCOUNTER — MEDICAL CORRESPONDENCE (OUTPATIENT)
Dept: HEALTH INFORMATION MANAGEMENT | Facility: CLINIC | Age: 67
End: 2017-10-16

## 2017-10-26 ENCOUNTER — TELEPHONE (OUTPATIENT)
Dept: CARDIOLOGY | Facility: CLINIC | Age: 67
End: 2017-10-26

## 2017-10-26 NOTE — TELEPHONE ENCOUNTER
Dr. Torres has reviewed BP recordings and requested for patient to come into clinic to see him with home blood pressure monitor to check against the clinic.  Left message for patient to call clinic.  Tamiko Luna RN

## 2017-10-26 NOTE — TELEPHONE ENCOUNTER
Spoke to pt. States that she lives up in [place] and does not have time to come to [place] before she leaves for her winter home in [place]. However, states that she can bring her cuff to the [facility] clinic for a nurse only visit to compare the readings and then let us know the results.     Will await call from pt.    Fran Rios RN

## 2017-10-31 NOTE — TELEPHONE ENCOUNTER
Spoke to patient and she never made it to the clinic to check her blood pressure but today her home reading was 123/70.  She is leaving for [place] tomorrow.  Discussed the importance of establishing care with a provider in [place] to make sure blood pressure stays under control.  Patient verbalized understanding.  She states she is going to a new location this year but has friends down there that will help her get established with a clinic in that location.    Tamiko Luna RN  Care Coordinator  [facility] [place] [facility]  [phone]

## 2018-01-18 DIAGNOSIS — E78.5 HYPERLIPIDEMIA WITH TARGET LDL LESS THAN 100: Primary | ICD-10-CM

## 2018-07-10 ENCOUNTER — TELEPHONE (OUTPATIENT)
Dept: CARDIOLOGY | Facility: CLINIC | Age: 68
End: 2018-07-10

## 2018-07-10 DIAGNOSIS — I25.10 CAD (CORONARY ARTERY DISEASE): ICD-10-CM

## 2018-07-10 DIAGNOSIS — I10 HYPERTENSION GOAL BP (BLOOD PRESSURE) < 140/90: ICD-10-CM

## 2018-07-10 DIAGNOSIS — E78.5 HYPERLIPIDEMIA WITH TARGET LDL LESS THAN 100: Primary | ICD-10-CM

## 2018-07-10 NOTE — TELEPHONE ENCOUNTER
Patient is schedule to see Dr. Torres on 9/13/2018. She will like to know if Dr. Torres will want her to get labs done before her upcoming appointment. Patient is concern with how her cholesterol and blood pressure medications working and if they are working well, so she will like labs taking before appointment to seeing Dr. Torres. She want to know what Dr. Torres thinks, and if he can put in an order for labs.    Shirley Hanson

## 2018-07-11 NOTE — TELEPHONE ENCOUNTER
Per Dr. Torres, order fasting lipids and BMP.  Labs ordered and patient informed.  Patient will be going to the [facility] for lab appt.    Tamiko Luna, RN  Care Coordinator  [facility] [place] [facility]  [phone]

## 2018-09-06 DIAGNOSIS — E78.5 HYPERLIPIDEMIA WITH TARGET LDL LESS THAN 100: ICD-10-CM

## 2018-09-06 DIAGNOSIS — I10 HYPERTENSION GOAL BP (BLOOD PRESSURE) < 140/90: ICD-10-CM

## 2018-09-06 DIAGNOSIS — I25.10 CAD (CORONARY ARTERY DISEASE): ICD-10-CM

## 2018-09-06 LAB
ANION GAP SERPL CALCULATED.3IONS-SCNC: 4 MMOL/L (ref 3–14)
BUN SERPL-MCNC: 17 MG/DL (ref 7–30)
CALCIUM SERPL-MCNC: 8.6 MG/DL (ref 8.5–10.1)
CHLORIDE SERPL-SCNC: 106 MMOL/L (ref 94–109)
CHOLEST SERPL-MCNC: 158 MG/DL
CO2 SERPL-SCNC: 30 MMOL/L (ref 20–32)
CREAT SERPL-MCNC: 0.99 MG/DL (ref 0.52–1.04)
GFR SERPL CREATININE-BSD FRML MDRD: 55 ML/MIN/1.7M2
GLUCOSE SERPL-MCNC: 100 MG/DL (ref 70–99)
HDLC SERPL-MCNC: 55 MG/DL
LDLC SERPL CALC-MCNC: 85 MG/DL
NONHDLC SERPL-MCNC: 103 MG/DL
POTASSIUM SERPL-SCNC: 3.9 MMOL/L (ref 3.4–5.3)
SODIUM SERPL-SCNC: 140 MMOL/L (ref 133–144)
TRIGL SERPL-MCNC: 91 MG/DL

## 2018-09-06 PROCEDURE — 80061 LIPID PANEL: CPT | Performed by: INTERNAL MEDICINE

## 2018-09-06 PROCEDURE — 80048 BASIC METABOLIC PNL TOTAL CA: CPT | Performed by: INTERNAL MEDICINE

## 2018-09-06 PROCEDURE — 36415 COLL VENOUS BLD VENIPUNCTURE: CPT | Performed by: INTERNAL MEDICINE

## 2018-09-13 ENCOUNTER — ALLIED HEALTH/NURSE VISIT (OUTPATIENT)
Dept: NURSING | Facility: CLINIC | Age: 68
End: 2018-09-13
Payer: COMMERCIAL

## 2018-09-13 ENCOUNTER — OFFICE VISIT (OUTPATIENT)
Dept: CARDIOLOGY | Facility: CLINIC | Age: 68
End: 2018-09-13
Attending: INTERNAL MEDICINE
Payer: COMMERCIAL

## 2018-09-13 VITALS
HEART RATE: 72 BPM | DIASTOLIC BLOOD PRESSURE: 83 MMHG | OXYGEN SATURATION: 95 % | WEIGHT: 198 LBS | SYSTOLIC BLOOD PRESSURE: 142 MMHG | BODY MASS INDEX: 33.99 KG/M2

## 2018-09-13 DIAGNOSIS — Z98.61 POSTSURGICAL PERCUTANEOUS TRANSLUMINAL CORONARY ANGIOPLASTY STATUS: Primary | ICD-10-CM

## 2018-09-13 DIAGNOSIS — Z23 NEED FOR PROPHYLACTIC VACCINATION AND INOCULATION AGAINST INFLUENZA: Primary | ICD-10-CM

## 2018-09-13 PROCEDURE — 99207 ZZC NO CHARGE NURSE ONLY: CPT

## 2018-09-13 PROCEDURE — 90662 IIV NO PRSV INCREASED AG IM: CPT

## 2018-09-13 PROCEDURE — 99213 OFFICE O/P EST LOW 20 MIN: CPT | Performed by: INTERNAL MEDICINE

## 2018-09-13 PROCEDURE — G0008 ADMIN INFLUENZA VIRUS VAC: HCPCS

## 2018-09-13 RX ORDER — LOSARTAN POTASSIUM 25 MG/1
TABLET ORAL
Qty: 180 TABLET | Refills: 3 | Status: SHIPPED | OUTPATIENT
Start: 2018-09-13 | End: 2019-09-05

## 2018-09-13 RX ORDER — NITROGLYCERIN 0.4 MG/1
0.4 TABLET SUBLINGUAL EVERY 5 MIN PRN
Qty: 25 TABLET | Refills: 1 | Status: SHIPPED | OUTPATIENT
Start: 2018-09-13 | End: 2021-10-07

## 2018-09-13 RX ORDER — ATORVASTATIN CALCIUM 40 MG/1
40 TABLET, FILM COATED ORAL DAILY
Qty: 90 TABLET | Refills: 3 | Status: SHIPPED | OUTPATIENT
Start: 2018-09-13 | End: 2019-09-05

## 2018-09-13 RX ORDER — CARVEDILOL 12.5 MG/1
12.5 TABLET ORAL 2 TIMES DAILY WITH MEALS
Qty: 180 TABLET | Refills: 3 | Status: SHIPPED | OUTPATIENT
Start: 2018-09-13 | End: 2019-09-05

## 2018-09-13 NOTE — LETTER
9/13/2018      RE: Sharri Elaine  [address] [address]       Dear Colleague,    Thank you for the opportunity to participate in the care of your patient, Sharri Elaine, at the [facility]. Please see a copy of my visit note below.       SUBJECTIVE:  Sharri Elaine is a 68 year old female who presents for follow up.  S/P pLAD stent at [facility] in 6/2010. Had repeat stent for ISR in 10/2010. Since then exertional symptoms,but have intermittent  Chest pain at rest which get zaria when she walk around. Take S/L NTG and pain goes away in 20 minutes.  No new symptoms.    Patient Active Problem List    Diagnosis Date Noted     Health Care Home 06/28/2011     Priority: High     DX V65.8 REPLACED WITH 38674 HEALTH CARE HOME (04/08/2013)       Chest pain 05/12/2016     Priority: Medium     Acute chest pain 05/12/2016     Priority: Medium     Advanced directives, counseling/discussion 05/04/2012     Priority: Medium     Advance Directive received and scanned. Click on Code in the patient header to view. 5/4/2012          Rosacea 08/20/2010     Priority: Medium     Hypertension goal BP (blood pressure) < 140/90 07/14/2010     Priority: Medium     Goal <130/80       Hyperlipidemia with target LDL less than 100 07/14/2010     Priority: Medium     January 4, 2011 - . I have asked this patient to obtain a fasting lipid profile tomorrow in Ghada Bermeo's office. She is complaining of some muscle weakness and/or achiness, especially in her legs. Perhaps simvastatin at 40 mg will need to be changed to something more potent such as Crestor. If she is having myalgias or myopathy, a change to a water soluble statin would be in order.   Diagnosis updated by automated process. Provider to review and confirm.       CAD (coronary artery disease)      Priority: Medium     Status post LAD stenting 06/28 for unstable  angina. She had in-stent restenosis in the LAD with further stenting on 11/03/2010 when she developed recurrent angina and was hospitalized at [facility]. Coronary angiography demonstrated a 60% circumflex ostial stenosis, a 95% proximal LAD in-stent restenosis along with a 50% ostial stenosis that was unchanged. The right coronary artery had a mild 10%-20% luminal narrowing but nothing significant. Her ejection fraction at that time was 45%. The in-stent restenosis was restented with an [place] drug-eluting stent.   Plavix was changed to Effient and the cardiologist, Dr. Torrez, felt that Effient should be continued at least 2 years or preferentially longer term. Nuclear stress test late summer 2011. We will consider a stress test earlier than that should the patient have symptoms.    Follow up with Cardiology in 6 months        Preinfarction syndrome (H)      Priority: Medium     (Problem list name updated by automated process. Provider to review and confirm.)      .  Current Outpatient Prescriptions   Medication Sig     aspirin 81 MG tablet Take 81 mg by mouth daily     atorvastatin (LIPITOR) 40 MG tablet Take 1 tablet (40 mg) by mouth daily     carvedilol (COREG) 12.5 MG tablet Take 1 tablet (12.5 mg) by mouth 2 times daily (with meals)     diphenhydrAMINE-acetaminophen (TYLENOL PM)  MG tablet Take 1 tablet by mouth At Bedtime.     losartan (COZAAR) 25 MG tablet Take one pill twice daily     nitroGLYcerin (NITROSTAT) 0.4 MG sublingual tablet Place 1 tablet (0.4 mg) under the tongue every 5 minutes as needed for chest pain     RaNITidine HCl (ZANTAC PO)      ORDER FOR DME Blood pressure cuff     [DISCONTINUED] atorvastatin (LIPITOR) 40 MG tablet Take 1 tablet (40 mg) by mouth daily     [DISCONTINUED] carvedilol (COREG) 12.5 MG tablet Take 1 tablet (12.5 mg) by mouth 2 times daily (with meals)     [DISCONTINUED] losartan (COZAAR) 25 MG tablet Take one pill twice daily     [DISCONTINUED]  nitroGLYcerin (NITROSTAT) 0.4 MG sublingual tablet Place 1 tablet (0.4 mg) under the tongue every 5 minutes as needed for chest pain     No current facility-administered medications for this visit.      Past Medical History:   Diagnosis Date     Acne rosacea      Anginas, unstable 2010     CAD (coronary artery disease) 2010    LAD stent x 2     Fibrocystic breast      Hepatitis     age 18     Iritis     14 YEARS OLD     Menopause     AN HRT     MI (myocardial infarction)     non T wave MI     NONSPECIFIC MEDICAL HISTORY 7/14/09    HEART MURMUR- NORMAL ECHO     Shingles 05/2017     Past Surgical History:   Procedure Laterality Date     ANGIOGRAM  6/2010     angiogram  11/3/2010    in stent restenosis     PVD STENTING  2010     x 2, LAD     Allergies   Allergen Reactions     Sulfa Drugs Rash     Social History     Social History     Marital status:      Spouse name: N/A     Number of children: N/A     Years of education: N/A     Occupational History     Not on file.     Social History Main Topics     Smoking status: Never Smoker     Smokeless tobacco: Never Used      Comment: never smoker; non-smoking household     Alcohol use Yes      Comment: rare     Drug use: No      Comment: never     Sexual activity: Yes     Partners: Male     Other Topics Concern     Not on file     Social History Narrative     Family History   Problem Relation Age of Onset     Hypertension Mother      HEART DISEASE Mother      CHF     Alzheimer Disease Father      Hypertension Daughter      High cholesterol Daughter      Breast Cancer No family hx of           REVIEW OF SYSTEMS:  General: negative, fever, chills, night sweats  Skin: negative, acne, rash and scaling  Eyes: negative, double vision, eye pain and photophobia  Ears/Nose/Throat: negative, nasal congestion and purulent rhinorrhea  Respiratory: No dyspnea on exertion, No cough, No hemoptysis and negative  Cardiovascular: negative, palpitations, tachycardia, irregular heart  beat, exertional chest pain or pressure, paroxysmal nocturnal dyspnea, dyspnea on exertion and orthopnea         OBJECTIVE:  Blood pressure 142/83, pulse 72, weight 89.8 kg (198 lb), SpO2 95 %, not currently breastfeeding.  General Appearance: healthy, alert, active and no distress  Head: Normocephalic. No masses, lesions, tenderness or abnormalities  Eyes: conjuctiva clear, PERRL, EOM intact  Ears: External ears normal. Canals clear. TM's normal.  Nose: Nares normal  Mouth: normal  Neck: Supple, no cervical adenopathy, no thyromegaly  Lungs: clear to auscultation  Cardiac: regular rate and rhythm, normal S1 and S2, no murmur         ASSESSMENT/PLAN:  Known CAD. S/P pLAD stent in 2010. ISR and re-stenting in 4 months.  Since then atypical chest pain.  No new symptoms.  Had and reviewed normal stress MPI 5/2016.  Will continue current meds.  Per orders.   Return to Clinic 1yr.    Please do not hesitate to contact me if you have any questions/concerns.     Sincerely,     LUCA Gillespie MD

## 2018-09-13 NOTE — NURSING NOTE
Med Reconcile: Reviewed and verified all current medications with the patient. The updated medication list was printed and given to the patient. Refill was sent to patient preferred pharmacy  Per request : Losartan,Lipitor,carvedilol and Nitrostat.    Return Appointment: 1 year follow-up per Dr Torres       Patient stated she understood all health information given and agreed to call with further questions or concerns.    Ju Rivers RN

## 2018-09-13 NOTE — MR AVS SNAPSHOT
After Visit Summary   9/13/2018    Sharri Elaine    MRN: [id number]           Patient Information     Date Of Birth          1950        Visit Information        Provider Department      9/13/2018 2:00 PM [facility]        Today's Diagnoses     Need for prophylactic vaccination and inoculation against influenza    -  1       Follow-ups after your visit        Who to contact     If you have questions or need follow up information about today's clinic visit or your schedule please contact [facility] directly at [phone].  Normal or non-critical lab and imaging results will be communicated to you by [employer], letter or phone within 4 business days after the clinic has received the results. If you do not hear from us within 7 days, please contact the clinic through [employer] or phone. If you have a critical or abnormal lab result, we will notify you by phone as soon as possible.  Submit refill requests through [employer] or call your pharmacy and they will forward the refill request to us. Please allow 3 business days for your refill to be completed.          Additional Information About Your Visit        MyChart Information     [employer] gives you secure access to your electronic health record. If you see a primary care provider, you can also send messages to your care team and make appointments. If you have questions, please call your primary care clinic.  If you do not have a primary care provider, please call [phone] and they will assist you.        Care EveryWhere ID     This is your Care EveryWhere ID. This could be used by other organizations to access your [facility] medical records  [id number]         Blood Pressure from Last 3 Encounters:   09/13/18 142/83   09/28/17 163/87   05/01/17 110/66    Weight from Last 3 Encounters:   09/13/18 89.8 kg (198 lb)   05/01/17 88.9 kg (196 lb)   10/19/16 90.7 kg (200 lb)              We Performed the Following      FLU VACCINE, INCREASED ANTIGEN, PRESV FREE, AGE 65+ [58550]     Vaccine Administration, Initial [01151]          Where to get your medicines      These medications were sent to [facility] - [place], [place] - [address] AT Portal to Registered [facility] Sites  [address], [facility] [address]     Phone:  [phone]     atorvastatin 40 MG tablet    carvedilol 12.5 MG tablet    losartan 25 MG tablet    nitroGLYcerin 0.4 MG sublingual tablet          Primary Care Provider Office Phone # Fax #    Kervin Rodriguez MD [facility] [phone]       [address] [id number]        Equal Access to Services     MICHELLE GAITAN : Adri Bennett, waricardo butterfield, qaybta kaalmada vadim, annie powell. So [facility] [phone].    ATENCIÓN: Si habla español, tiene a odom disposición servicios gratuitos de asistencia lingüística. Llame al [phone].    We comply with applicable federal civil rights laws and [place] laws. We do not discriminate on the basis of race, color, national origin, age, disability, sex, sexual orientation, or gender identity.            Thank you!     Thank you for choosing [facility]  for your care. Our goal is always to provide you with excellent care. Hearing back from our patients is one way we can continue to improve our services. Please take a few minutes to complete the written survey that you may receive in the mail after your visit with us. Thank you!             Your Updated Medication List - Protect others around you: Learn how to safely use, store and throw away your medicines at www.disposemymeds.org.          This list is accurate as of 9/13/18  2:15 PM.  Always use your most recent med list.                   Brand Name Dispense Instructions for use Diagnosis    aspirin 81 MG tablet      Take 81 mg by mouth daily        atorvastatin 40 MG tablet    LIPITOR    90 tablet    Take 1 tablet  (40 mg) by mouth daily    Hyperlipidemia with target LDL less than 100       carvedilol 12.5 MG tablet    COREG    180 tablet    Take 1 tablet (12.5 mg) by mouth 2 times daily (with meals)    Hyperlipidemia with target LDL less than 100, CAD (coronary artery disease)       diphenhydrAMINE-acetaminophen  MG tablet    TYLENOL PM     Take 1 tablet by mouth At Bedtime.        losartan 25 MG tablet    COZAAR    180 tablet    Take one pill twice daily    Hyperlipidemia with target LDL less than 100, CAD (coronary artery disease)       nitroGLYcerin 0.4 MG sublingual tablet    NITROSTAT    25 tablet    Place 1 tablet (0.4 mg) under the tongue every 5 minutes as needed for chest pain    CAD (coronary artery disease)       order for DME     1 Device    Blood pressure cuff    HTN (hypertension)       ZANTAC PO

## 2018-09-13 NOTE — NURSING NOTE
"Chief Complaint   Patient presents with     RECHECK     1 year CAD, HTN. Pt reports chest twinges and jaw tightening sometimes, not in the past month.       Initial /83 (BP Location: Right arm, Patient Position: Chair, Cuff Size: Adult Large)  Pulse 72  Wt 89.8 kg (198 lb)  SpO2 95%  BMI 33.99 kg/m2 Estimated body mass index is 33.99 kg/(m^2) as calculated from the following:    Height as of 8/4/16: 1.626 m (5' 4\").    Weight as of this encounter: 89.8 kg (198 lb)..  BP completed using cuff size: izabel Carter MA    "

## 2018-09-13 NOTE — PROGRESS NOTES
Injectable Influenza Immunization Documentation    1.  Is the person to be vaccinated sick today?   No    2. Does the person to be vaccinated have an allergy to a component   of the vaccine?   No  Egg Allergy Algorithm Link    3. Has the person to be vaccinated ever had a serious reaction   to influenza vaccine in the past?   No    4. Has the person to be vaccinated ever had Guillain-Barré syndrome?   No    Form completed by Priscila Wheat RN     Prior to injection verified patient identity using patient's name and date of birth.  Due to injection administration, patient instructed to remain in clinic for 15 minutes  afterwards, and to report any adverse reaction to me immediately.

## 2018-09-13 NOTE — PROGRESS NOTES
SUBJECTIVE:  Sharri Elaine is a 68 year old female who presents for follow up.  S/P pLAD stent at [facility] in 6/2010. Had repeat stent for ISR in 10/2010. Since then exertional symptoms,but have intermittent  Chest pain at rest which get zaria when she walk around. Take S/L NTG and pain goes away in 20 minutes.  No new symptoms.    Patient Active Problem List    Diagnosis Date Noted     Health Care Home 06/28/2011     Priority: High     DX V65.8 REPLACED WITH 60686 HEALTH CARE HOME (04/08/2013)       Chest pain 05/12/2016     Priority: Medium     Acute chest pain 05/12/2016     Priority: Medium     Advanced directives, counseling/discussion 05/04/2012     Priority: Medium     Advance Directive received and scanned. Click on Code in the patient header to view. 5/4/2012          Rosacea 08/20/2010     Priority: Medium     Hypertension goal BP (blood pressure) < 140/90 07/14/2010     Priority: Medium     Goal <130/80       Hyperlipidemia with target LDL less than 100 07/14/2010     Priority: Medium     January 4, 2011 - . I have asked this patient to obtain a fasting lipid profile tomorrow in Ghada Bermeo's office. She is complaining of some muscle weakness and/or achiness, especially in her legs. Perhaps simvastatin at 40 mg will need to be changed to something more potent such as Crestor. If she is having myalgias or myopathy, a change to a water soluble statin would be in order.   Diagnosis updated by automated process. Provider to review and confirm.       CAD (coronary artery disease)      Priority: Medium     Status post LAD stenting 06/28 for unstable angina. She had in-stent restenosis in the LAD with further stenting on 11/03/2010 when she developed recurrent angina and was hospitalized at [facility]. Coronary angiography demonstrated a 60% circumflex ostial stenosis, a 95% proximal LAD in-stent restenosis along with a 50% ostial stenosis that was unchanged. The right coronary artery had a  mild 10%-20% luminal narrowing but nothing significant. Her ejection fraction at that time was 45%. The in-stent restenosis was restented with an [place] drug-eluting stent.   Plavix was changed to Effient and the cardiologist, Dr. Torrez, felt that Effient should be continued at least 2 years or preferentially longer term. Nuclear stress test late summer 2011. We will consider a stress test earlier than that should the patient have symptoms.    Follow up with Cardiology in 6 months        Preinfarction syndrome (H)      Priority: Medium     (Problem list name updated by automated process. Provider to review and confirm.)      .  Current Outpatient Prescriptions   Medication Sig     aspirin 81 MG tablet Take 81 mg by mouth daily     atorvastatin (LIPITOR) 40 MG tablet Take 1 tablet (40 mg) by mouth daily     carvedilol (COREG) 12.5 MG tablet Take 1 tablet (12.5 mg) by mouth 2 times daily (with meals)     diphenhydrAMINE-acetaminophen (TYLENOL PM)  MG tablet Take 1 tablet by mouth At Bedtime.     losartan (COZAAR) 25 MG tablet Take one pill twice daily     nitroGLYcerin (NITROSTAT) 0.4 MG sublingual tablet Place 1 tablet (0.4 mg) under the tongue every 5 minutes as needed for chest pain     RaNITidine HCl (ZANTAC PO)      ORDER FOR DME Blood pressure cuff     [DISCONTINUED] atorvastatin (LIPITOR) 40 MG tablet Take 1 tablet (40 mg) by mouth daily     [DISCONTINUED] carvedilol (COREG) 12.5 MG tablet Take 1 tablet (12.5 mg) by mouth 2 times daily (with meals)     [DISCONTINUED] losartan (COZAAR) 25 MG tablet Take one pill twice daily     [DISCONTINUED] nitroGLYcerin (NITROSTAT) 0.4 MG sublingual tablet Place 1 tablet (0.4 mg) under the tongue every 5 minutes as needed for chest pain     No current facility-administered medications for this visit.      Past Medical History:   Diagnosis Date     Acne rosacea      Anginas, unstable 2010     CAD (coronary artery disease) 2010    LAD stent x 2     Fibrocystic breast       Hepatitis     age 18     Iritis     14 YEARS OLD     Menopause     AN HRT     MI (myocardial infarction)     non T wave MI     NONSPECIFIC MEDICAL HISTORY 7/14/09    HEART MURMUR- NORMAL ECHO     Shingles 05/2017     Past Surgical History:   Procedure Laterality Date     ANGIOGRAM  6/2010     angiogram  11/3/2010    in stent restenosis     PVD STENTING  2010     x 2, LAD     Allergies   Allergen Reactions     Sulfa Drugs Rash     Social History     Social History     Marital status:      Spouse name: N/A     Number of children: N/A     Years of education: N/A     Occupational History     Not on file.     Social History Main Topics     Smoking status: Never Smoker     Smokeless tobacco: Never Used      Comment: never smoker; non-smoking household     Alcohol use Yes      Comment: rare     Drug use: No      Comment: never     Sexual activity: Yes     Partners: Male     Other Topics Concern     Not on file     Social History Narrative     Family History   Problem Relation Age of Onset     Hypertension Mother      HEART DISEASE Mother      CHF     Alzheimer Disease Father      Hypertension Daughter      High cholesterol Daughter      Breast Cancer No family hx of           REVIEW OF SYSTEMS:  General: negative, fever, chills, night sweats  Skin: negative, acne, rash and scaling  Eyes: negative, double vision, eye pain and photophobia  Ears/Nose/Throat: negative, nasal congestion and purulent rhinorrhea  Respiratory: No dyspnea on exertion, No cough, No hemoptysis and negative  Cardiovascular: negative, palpitations, tachycardia, irregular heart beat, exertional chest pain or pressure, paroxysmal nocturnal dyspnea, dyspnea on exertion and orthopnea         OBJECTIVE:  Blood pressure 142/83, pulse 72, weight 89.8 kg (198 lb), SpO2 95 %, not currently breastfeeding.  General Appearance: healthy, alert, active and no distress  Head: Normocephalic. No masses, lesions, tenderness or abnormalities  Eyes: conjuctiva  clear, PERRL, EOM intact  Ears: External ears normal. Canals clear. TM's normal.  Nose: Nares normal  Mouth: normal  Neck: Supple, no cervical adenopathy, no thyromegaly  Lungs: clear to auscultation  Cardiac: regular rate and rhythm, normal S1 and S2, no murmur         ASSESSMENT/PLAN:  Known CAD. S/P pLAD stent in 2010. ISR and re-stenting in 4 months.  Since then atypical chest pain.  No new symptoms.  Had and reviewed normal stress MPI 5/2016.  Will continue current meds.  Per orders.   Return to Clinic 1yr.

## 2018-09-13 NOTE — MR AVS SNAPSHOT
After Visit Summary   9/13/2018    Sharri Elaine    MRN: [id number]           Patient Information     Date Of Birth          1950        Visit Information        Provider Department      9/13/2018 1:30 PM LUCA Gillespie MD [facility]        Today's Diagnoses     CAD (coronary artery disease)    -  1    Hyperlipidemia with target LDL less than 100          Care Instructions    Thank you for coming to the [facility] @ [facility]; please note the following instructions:    1. 2 year follow-up with Echo prior per Dr Torres,clinic will call as time gets closer.         If you have any questions regarding your visit please contact your care team:     Cardiology  Telephone Number   Tamiko CARABALLO, RN  Fran MCGUIRE, RN   Loraine GRAY, SUZIE CANO MA   [phone]    *After hours: [phone]   For scheduling appts:     [phone] or    [phone] (select option 1)    *After hours: [phone]     For the Device Clinic (Pacemakers and ICD's)  RN's :  Nae Hodge   During business hours: [phone]    *After business hours:  [phone] (select option 4)      Normal test result notifications will be released via [employer] or mailed within 7 business days.  All other test results, will be communicated via telephone once reviewed by your cardiologist.    If you need a medication refill please contact your pharmacy.  Please allow 3 business days for your refill to be completed.    As always, thank you for trusting us with your health care needs!            Follow-ups after your visit        Who to contact     If you have questions or need follow up information about today's clinic visit or your schedule please contact [facility] directly at [phone].  Normal or non-critical lab and imaging results will be communicated to you by MyChart, letter or phone within 4 business  days after the clinic has received the results. If you do not hear from us within 7 days, please contact the clinic through [employer] or phone. If you have a critical or abnormal lab result, we will notify you by phone as soon as possible.  Submit refill requests through [employer] or call your pharmacy and they will forward the refill request to us. Please allow 3 business days for your refill to be completed.          Additional Information About Your Visit        [employer] Information     [employer] gives you secure access to your electronic health record. If you see a primary care provider, you can also send messages to your care team and make appointments. If you have questions, please call your primary care clinic.  If you do not have a primary care provider, please call [phone] and they will assist you.        Care EveryWhere ID     This is your Care EveryWhere ID. This could be used by other organizations to access your [facility] medical records  [id number]        Your Vitals Were     Pulse Pulse Oximetry BMI (Body Mass Index)             72 95% 33.99 kg/m2          Blood Pressure from Last 3 Encounters:   09/13/18 142/83   09/28/17 163/87   05/01/17 110/66    Weight from Last 3 Encounters:   09/13/18 89.8 kg (198 lb)   05/01/17 88.9 kg (196 lb)   10/19/16 90.7 kg (200 lb)              We Performed the Following     Follow-Up with Cardiologist          Where to get your medicines      These medications were sent to [facility] - [place], [place] - [address] AT Portal to Registered [facility] Sites  [address], [place] [address]     Phone:  [phone]     atorvastatin 40 MG tablet    carvedilol 12.5 MG tablet    losartan 25 MG tablet    nitroGLYcerin 0.4 MG sublingual tablet          Primary Care Provider Office Phone # Fax #    Kervin Ur MD Riley [phone]       [address] [place] [address]        Equal Access to Services     MICHELLE GAITAN AH: Adri ramos  armen Bennett, waaxda luqadaha, qaybta kaalmada vadim, annie ivettin hayaan saulodebbi danielshashi laregangertrude sherry. So [facility] [phone].    ATENCIÓN: Si habla español, tiene a odom disposición servicios gratuitos de asistencia lingüística. Jazmin al [phone].    We comply with applicable federal civil rights laws and [place] laws. We do not discriminate on the basis of race, color, national origin, age, disability, sex, sexual orientation, or gender identity.            Thank you!     Thank you for choosing [facility]  for your care. Our goal is always to provide you with excellent care. Hearing back from our patients is one way we can continue to improve our services. Please take a few minutes to complete the written survey that you may receive in the mail after your visit with us. Thank you!             Your Updated Medication List - Protect others around you: Learn how to safely use, store and throw away your medicines at www.disposemymeds.org.          This list is accurate as of 9/13/18  1:59 PM.  Always use your most recent med list.                   Brand Name Dispense Instructions for use Diagnosis    aspirin 81 MG tablet      Take 81 mg by mouth daily        atorvastatin 40 MG tablet    LIPITOR    90 tablet    Take 1 tablet (40 mg) by mouth daily    Hyperlipidemia with target LDL less than 100       carvedilol 12.5 MG tablet    COREG    180 tablet    Take 1 tablet (12.5 mg) by mouth 2 times daily (with meals)    Hyperlipidemia with target LDL less than 100, CAD (coronary artery disease)       diphenhydrAMINE-acetaminophen  MG tablet    TYLENOL PM     Take 1 tablet by mouth At Bedtime.        losartan 25 MG tablet    COZAAR    180 tablet    Take one pill twice daily    Hyperlipidemia with target LDL less than 100, CAD (coronary artery disease)       nitroGLYcerin 0.4 MG sublingual tablet    NITROSTAT    25 tablet    Place 1 tablet (0.4 mg) under the tongue  every 5 minutes as needed for chest pain    CAD (coronary artery disease)       order for DME     1 Device    Blood pressure cuff    HTN (hypertension)       ZANTAC PO

## 2018-09-13 NOTE — PATIENT INSTRUCTIONS
Thank you for coming to the [facility] @ [place] [facility]; please note the following instructions:    1. 1 year follow-up per Dr Torres,clinic will call as time gets closer.         If you have any questions regarding your visit please contact your care team:     Cardiology  Telephone Number   Tamiko CARABALLO, RN  Fran MCGUIRE, RN   Loraine GRAY, SUZIE CANO MA   [phone]    *After hours: [phone]   For scheduling appts:     [phone] or    [phone] (select option 1)    *After hours: [phone]     For the Device Clinic (Pacemakers and ICD's)  RN's :  Nae Hodge   During business hours: [phone]    *After business hours:  [phone] (select option 4)      Normal test result notifications will be released via [employer] or mailed within 7 business days.  All other test results, will be communicated via telephone once reviewed by your cardiologist.    If you need a medication refill please contact your pharmacy.  Please allow 3 business days for your refill to be completed.    As always, thank you for trusting us with your health care needs!

## 2019-05-29 ENCOUNTER — HOSPITAL ENCOUNTER (OUTPATIENT)
Facility: CLINIC | Age: 69
Setting detail: OBSERVATION
Discharge: HOME | End: 2019-05-30
Attending: EMERGENCY MEDICINE | Admitting: FAMILY MEDICINE
Payer: MEDICARE

## 2019-05-29 ENCOUNTER — APPOINTMENT (OUTPATIENT)
Dept: GENERAL RADIOLOGY | Facility: CLINIC | Age: 69
End: 2019-05-29
Attending: EMERGENCY MEDICINE
Payer: MEDICARE

## 2019-05-29 DIAGNOSIS — R07.9 CHEST PAIN, UNSPECIFIED TYPE: ICD-10-CM

## 2019-05-29 LAB
ALBUMIN SERPL-MCNC: 3.9 G/DL (ref 3.4–5)
ALBUMIN UR-MCNC: NEGATIVE MG/DL
ALP SERPL-CCNC: 204 U/L (ref 40–150)
ALT SERPL W P-5'-P-CCNC: 25 U/L (ref 0–50)
ANION GAP SERPL CALCULATED.3IONS-SCNC: 4 MMOL/L (ref 3–14)
APPEARANCE UR: CLEAR
AST SERPL W P-5'-P-CCNC: 17 U/L (ref 0–45)
BASOPHILS # BLD AUTO: 0.1 10E9/L (ref 0–0.2)
BASOPHILS NFR BLD AUTO: 0.9 %
BILIRUB SERPL-MCNC: 0.5 MG/DL (ref 0.2–1.3)
BILIRUB UR QL STRIP: NEGATIVE
BUN SERPL-MCNC: 13 MG/DL (ref 7–30)
CALCIUM SERPL-MCNC: 9.2 MG/DL (ref 8.5–10.1)
CHLORIDE SERPL-SCNC: 109 MMOL/L (ref 94–109)
CO2 SERPL-SCNC: 30 MMOL/L (ref 20–32)
COLOR UR AUTO: ABNORMAL
CREAT SERPL-MCNC: 0.9 MG/DL (ref 0.52–1.04)
DIFFERENTIAL METHOD BLD: NORMAL
EOSINOPHIL # BLD AUTO: 0.3 10E9/L (ref 0–0.7)
EOSINOPHIL NFR BLD AUTO: 4.3 %
ERYTHROCYTE [DISTWIDTH] IN BLOOD BY AUTOMATED COUNT: 12.5 % (ref 10–15)
GFR SERPL CREATININE-BSD FRML MDRD: 65 ML/MIN/{1.73_M2}
GLUCOSE SERPL-MCNC: 102 MG/DL (ref 70–99)
GLUCOSE UR STRIP-MCNC: NEGATIVE MG/DL
HCT VFR BLD AUTO: 44.3 % (ref 35–47)
HGB BLD-MCNC: 14.4 G/DL (ref 11.7–15.7)
HGB UR QL STRIP: NEGATIVE
HYALINE CASTS #/AREA URNS LPF: 1 /LPF (ref 0–2)
IMM GRANULOCYTES # BLD: 0 10E9/L (ref 0–0.4)
IMM GRANULOCYTES NFR BLD: 0.2 %
KETONES UR STRIP-MCNC: NEGATIVE MG/DL
LEUKOCYTE ESTERASE UR QL STRIP: ABNORMAL
LYMPHOCYTES # BLD AUTO: 1.8 10E9/L (ref 0.8–5.3)
LYMPHOCYTES NFR BLD AUTO: 30.9 %
MCH RBC QN AUTO: 31.6 PG (ref 26.5–33)
MCHC RBC AUTO-ENTMCNC: 32.5 G/DL (ref 31.5–36.5)
MCV RBC AUTO: 97 FL (ref 78–100)
MONOCYTES # BLD AUTO: 0.6 10E9/L (ref 0–1.3)
MONOCYTES NFR BLD AUTO: 9.7 %
MUCOUS THREADS #/AREA URNS LPF: PRESENT /LPF
NEUTROPHILS # BLD AUTO: 3.1 10E9/L (ref 1.6–8.3)
NEUTROPHILS NFR BLD AUTO: 54 %
NITRATE UR QL: NEGATIVE
NRBC # BLD AUTO: 0 10*3/UL
NRBC BLD AUTO-RTO: 0 /100
PH UR STRIP: 7 PH (ref 5–7)
PLATELET # BLD AUTO: 313 10E9/L (ref 150–450)
POTASSIUM SERPL-SCNC: 4.4 MMOL/L (ref 3.4–5.3)
PROT SERPL-MCNC: 7.8 G/DL (ref 6.8–8.8)
RBC # BLD AUTO: 4.55 10E12/L (ref 3.8–5.2)
RBC #/AREA URNS AUTO: 1 /HPF (ref 0–2)
SODIUM SERPL-SCNC: 143 MMOL/L (ref 133–144)
SOURCE: ABNORMAL
SP GR UR STRIP: 1.01 (ref 1–1.03)
SQUAMOUS #/AREA URNS AUTO: 2 /HPF (ref 0–1)
TROPONIN I SERPL-MCNC: <0.015 UG/L (ref 0–0.04)
UROBILINOGEN UR STRIP-MCNC: 0 MG/DL (ref 0–2)
WBC # BLD AUTO: 5.8 10E9/L (ref 4–11)
WBC #/AREA URNS AUTO: 3 /HPF (ref 0–5)

## 2019-05-29 PROCEDURE — 25000125 ZZHC RX 250: Performed by: PHYSICIAN ASSISTANT

## 2019-05-29 PROCEDURE — 25000132 ZZH RX MED GY IP 250 OP 250 PS 637: Mod: GY | Performed by: EMERGENCY MEDICINE

## 2019-05-29 PROCEDURE — A9270 NON-COVERED ITEM OR SERVICE: HCPCS | Mod: GY | Performed by: EMERGENCY MEDICINE

## 2019-05-29 PROCEDURE — 99285 EMERGENCY DEPT VISIT HI MDM: CPT | Mod: 25 | Performed by: EMERGENCY MEDICINE

## 2019-05-29 PROCEDURE — 99220 ZZC INITIAL OBSERVATION CARE,LEVL III: CPT | Performed by: PHYSICIAN ASSISTANT

## 2019-05-29 PROCEDURE — A9270 NON-COVERED ITEM OR SERVICE: HCPCS | Performed by: PHYSICIAN ASSISTANT

## 2019-05-29 PROCEDURE — G0378 HOSPITAL OBSERVATION PER HR: HCPCS

## 2019-05-29 PROCEDURE — 93010 ELECTROCARDIOGRAM REPORT: CPT | Mod: Z6 | Performed by: EMERGENCY MEDICINE

## 2019-05-29 PROCEDURE — 25000132 ZZH RX MED GY IP 250 OP 250 PS 637: Performed by: PHYSICIAN ASSISTANT

## 2019-05-29 PROCEDURE — 87640 STAPH A DNA AMP PROBE: CPT | Mod: XU | Performed by: FAMILY MEDICINE

## 2019-05-29 PROCEDURE — 85025 COMPLETE CBC W/AUTO DIFF WBC: CPT | Performed by: EMERGENCY MEDICINE

## 2019-05-29 PROCEDURE — 96376 TX/PRO/DX INJ SAME DRUG ADON: CPT

## 2019-05-29 PROCEDURE — 84484 ASSAY OF TROPONIN QUANT: CPT | Performed by: EMERGENCY MEDICINE

## 2019-05-29 PROCEDURE — 25000128 H RX IP 250 OP 636: Performed by: EMERGENCY MEDICINE

## 2019-05-29 PROCEDURE — 71046 X-RAY EXAM CHEST 2 VIEWS: CPT

## 2019-05-29 PROCEDURE — 80053 COMPREHEN METABOLIC PANEL: CPT | Performed by: EMERGENCY MEDICINE

## 2019-05-29 PROCEDURE — 96374 THER/PROPH/DIAG INJ IV PUSH: CPT | Performed by: EMERGENCY MEDICINE

## 2019-05-29 PROCEDURE — 81001 URINALYSIS AUTO W/SCOPE: CPT | Performed by: EMERGENCY MEDICINE

## 2019-05-29 PROCEDURE — 87641 MR-STAPH DNA AMP PROBE: CPT | Performed by: FAMILY MEDICINE

## 2019-05-29 PROCEDURE — 93005 ELECTROCARDIOGRAM TRACING: CPT

## 2019-05-29 PROCEDURE — 36415 COLL VENOUS BLD VENIPUNCTURE: CPT | Performed by: EMERGENCY MEDICINE

## 2019-05-29 PROCEDURE — 93005 ELECTROCARDIOGRAM TRACING: CPT | Performed by: EMERGENCY MEDICINE

## 2019-05-29 RX ORDER — SODIUM CHLORIDE 9 MG/ML
INJECTION, SOLUTION INTRAVENOUS CONTINUOUS
Status: DISCONTINUED | OUTPATIENT
Start: 2019-05-29 | End: 2019-05-30 | Stop reason: HOSPADM

## 2019-05-29 RX ORDER — NITROGLYCERIN 0.4 MG/1
0.4 TABLET SUBLINGUAL EVERY 5 MIN PRN
Status: DISCONTINUED | OUTPATIENT
Start: 2019-05-29 | End: 2019-05-30 | Stop reason: HOSPADM

## 2019-05-29 RX ORDER — ONDANSETRON 4 MG/1
4 TABLET, ORALLY DISINTEGRATING ORAL EVERY 6 HOURS PRN
Status: DISCONTINUED | OUTPATIENT
Start: 2019-05-29 | End: 2019-05-30 | Stop reason: HOSPADM

## 2019-05-29 RX ORDER — ACETAMINOPHEN 325 MG/1
650 TABLET ORAL EVERY 4 HOURS PRN
Status: DISCONTINUED | OUTPATIENT
Start: 2019-05-29 | End: 2019-05-30 | Stop reason: HOSPADM

## 2019-05-29 RX ORDER — MORPHINE SULFATE 4 MG/ML
4 INJECTION, SOLUTION INTRAMUSCULAR; INTRAVENOUS ONCE
Status: COMPLETED | OUTPATIENT
Start: 2019-05-29 | End: 2019-05-29

## 2019-05-29 RX ORDER — MORPHINE SULFATE 2 MG/ML
2-4 INJECTION, SOLUTION INTRAMUSCULAR; INTRAVENOUS
Status: DISCONTINUED | OUTPATIENT
Start: 2019-05-29 | End: 2019-05-30 | Stop reason: HOSPADM

## 2019-05-29 RX ORDER — ACETAMINOPHEN 650 MG/1
650 SUPPOSITORY RECTAL EVERY 4 HOURS PRN
Status: DISCONTINUED | OUTPATIENT
Start: 2019-05-29 | End: 2019-05-30 | Stop reason: HOSPADM

## 2019-05-29 RX ORDER — NALOXONE HYDROCHLORIDE 0.4 MG/ML
.1-.4 INJECTION, SOLUTION INTRAMUSCULAR; INTRAVENOUS; SUBCUTANEOUS
Status: DISCONTINUED | OUTPATIENT
Start: 2019-05-29 | End: 2019-05-30 | Stop reason: HOSPADM

## 2019-05-29 RX ORDER — IBUPROFEN 600 MG/1
600 TABLET, FILM COATED ORAL EVERY 6 HOURS PRN
Status: DISCONTINUED | OUTPATIENT
Start: 2019-05-29 | End: 2019-05-30 | Stop reason: HOSPADM

## 2019-05-29 RX ORDER — ONDANSETRON 2 MG/ML
4 INJECTION INTRAMUSCULAR; INTRAVENOUS EVERY 6 HOURS PRN
Status: DISCONTINUED | OUTPATIENT
Start: 2019-05-29 | End: 2019-05-30 | Stop reason: HOSPADM

## 2019-05-29 RX ORDER — ATORVASTATIN CALCIUM 20 MG/1
40 TABLET, FILM COATED ORAL DAILY
Status: DISCONTINUED | OUTPATIENT
Start: 2019-05-29 | End: 2019-05-30 | Stop reason: HOSPADM

## 2019-05-29 RX ORDER — LIDOCAINE 40 MG/G
CREAM TOPICAL
Status: DISCONTINUED | OUTPATIENT
Start: 2019-05-29 | End: 2019-05-30 | Stop reason: HOSPADM

## 2019-05-29 RX ORDER — LOSARTAN POTASSIUM 25 MG/1
25 TABLET ORAL 2 TIMES DAILY
Status: DISCONTINUED | OUTPATIENT
Start: 2019-05-29 | End: 2019-05-30 | Stop reason: HOSPADM

## 2019-05-29 RX ORDER — ALUMINA, MAGNESIA, AND SIMETHICONE 2400; 2400; 240 MG/30ML; MG/30ML; MG/30ML
30 SUSPENSION ORAL EVERY 4 HOURS PRN
Status: DISCONTINUED | OUTPATIENT
Start: 2019-05-29 | End: 2019-05-30 | Stop reason: HOSPADM

## 2019-05-29 RX ORDER — ASPIRIN 81 MG/1
81 TABLET, CHEWABLE ORAL DAILY
Status: DISCONTINUED | OUTPATIENT
Start: 2019-05-30 | End: 2019-05-30 | Stop reason: HOSPADM

## 2019-05-29 RX ORDER — ACETAMINOPHEN 325 MG/1
650 TABLET ORAL EVERY 4 HOURS PRN
Status: DISCONTINUED | OUTPATIENT
Start: 2019-05-29 | End: 2019-05-29

## 2019-05-29 RX ORDER — NITROGLYCERIN 0.4 MG/1
0.4 TABLET SUBLINGUAL EVERY 5 MIN PRN
Status: DISCONTINUED | OUTPATIENT
Start: 2019-05-29 | End: 2019-05-29

## 2019-05-29 RX ORDER — NALOXONE HYDROCHLORIDE 0.4 MG/ML
.1-.4 INJECTION, SOLUTION INTRAMUSCULAR; INTRAVENOUS; SUBCUTANEOUS
Status: DISCONTINUED | OUTPATIENT
Start: 2019-05-29 | End: 2019-05-29

## 2019-05-29 RX ADMIN — MORPHINE SULFATE 4 MG: 2 INJECTION, SOLUTION INTRAMUSCULAR; INTRAVENOUS at 22:56

## 2019-05-29 RX ADMIN — LIDOCAINE HYDROCHLORIDE 30 ML: 20 SOLUTION ORAL; TOPICAL at 20:03

## 2019-05-29 RX ADMIN — NITROGLYCERIN 0.4 MG: 0.4 TABLET SUBLINGUAL at 10:03

## 2019-05-29 RX ADMIN — MORPHINE SULFATE 2 MG: 2 INJECTION, SOLUTION INTRAMUSCULAR; INTRAVENOUS at 20:28

## 2019-05-29 RX ADMIN — MORPHINE SULFATE 4 MG: 4 INJECTION INTRAVENOUS at 11:01

## 2019-05-29 ASSESSMENT — MIFFLIN-ST. JEOR
SCORE: 1421
SCORE: 1366.83

## 2019-05-29 NOTE — ED NOTES
Pt has been having left sided chest pain into her jaw for months, she will take a nitroglycerin and pain would go away. Has not been seen for this. Today she feels the pain is more intense then normal and took nitroglycerin x 2 and 2 325MG ASA with no help. She denies SOB, N/V, fever/chills, cough, states pain is worse when she is flat and better when sitting up.     she is a/o x 4, eating and drinking normal. Has NOT taken her BP meds this am due to she has not ate anything yet and likes to take them after she eats. Was up more during the noc with urination but feels this is from taking an extra losartan.

## 2019-05-29 NOTE — ED TRIAGE NOTES
cp since 5 am today radiating into jaw, high blood pressure, 2 nitro and asa did not help, nauseated, no soa, hx of 3 stents

## 2019-05-29 NOTE — PLAN OF CARE
Denies chest pain, chest pressure or chest tightness. Denies feeling short of air, denies pain with deep breathes.  Patient has been up walking around in the room.

## 2019-05-29 NOTE — ED NOTES
States that since she returned from  [place] in April, she has found that simple tasks make her tired, has to stop and rest just from making bed.

## 2019-05-29 NOTE — ED NOTES
"Patient has  [place] to Observation  order. Patient has been given the Observation brochure -  What does Observation mean to me.\"  Patient has been given the opportunity to ask questions about observation status and their plan of care.      Amy Ruano  "

## 2019-05-29 NOTE — PROGRESS NOTES
"[facility] ADMISSION NOTE    Patient admitted to room 1004 at approximately 1630 via wheel chair from emergency room. Patient was accompanied by other:none.     Verbal SBAR report received from BRENTON Tapia prior to patient arrival.     Patient ambulated to bed independently. Patient alert and oriented X 3. The patient is not having any pain. 0-10 Pain Scale: 4. Admission vital signs: Blood pressure 171/85, pulse 67, temperature 97.9  F (36.6  C), temperature source Oral, resp. rate 10, height 1.626 m (5' 4\"), weight 86.2 kg (190 lb), SpO2 96 %, not currently breastfeeding. Patient was oriented to plan of care, call light, bed controls, tv, telephone, bathroom and visiting hours.     Risk Assessment    The following safety risks were identified during admission: none. Yellow risk band applied: NO.     Skin Initial Assessment    This writer admitted this patient and completed a full skin assessment and Dario score in the Adult PCS flowsheet. Appropriate interventions initiated as needed.     Secondary skin check completed by BRENTON Acevedo.  1630-Patient information packet given to patient and then reviewed with patient including the Bill of Rights. Alejandro Jiménez RN,BC, CCRN    "

## 2019-05-29 NOTE — ED PROVIDER NOTES
History     Chief Complaint   Patient presents with     Chest Pain     cp since 5 am today radiating into jaw, high blood pressure, 2 nitro and asa did not help, nauseated, no soa, hx of 3 stents     HPI  Sharri Elaine is a 68 year old female with a history of previous cardiac stenting x3 back in 2010 who presents the emergency department complaining of left-sided chest pain radiating to her neck.  Patient states she has had intermittent chest pain over the past years but it has always resolved with her nitro.  Over the last 3 days she has had had similar pain but has taken nitro the last 2 days with resolution of the symptoms but today when she took her nitro x2 as well as 2 aspirin she is still having pain in her neck.  Is a bit more intense than usual.  She denies any recent illness has not had any chest trauma.  She denies a headache.  She has not had any visual changes she denies any shortness of breath.  She is not had any abdominal pain or back pain and denies any focal numbness weakness in extremity.  Currently rates her pain a 3 out of 10.  She has not had any leg swelling or rash noted.  Allergies:  Allergies   Allergen Reactions     Sulfa Drugs Rash       Problem List:    Patient Active Problem List    Diagnosis Date Noted     Health Care Home 06/28/2011     Priority: High     DX V65.8 REPLACED WITH 47963 HEALTH CARE HOME (04/08/2013)       Chest pain 05/12/2016     Priority: Medium     Acute chest pain 05/12/2016     Priority: Medium     Advanced directives, counseling/discussion 05/04/2012     Priority: Medium     Advance Directive received and scanned. Click on Code in the patient header to view. 5/4/2012          Rosacea 08/20/2010     Priority: Medium     Hypertension goal BP (blood pressure) < 140/90 07/14/2010     Priority: Medium     Goal <130/80       Hyperlipidemia with target LDL less than 100 07/14/2010     Priority: Medium     January 4, 2011 - . I have asked this patient to obtain a  fasting lipid profile tomorrow in Ghada Bermeo's office. She is complaining of some muscle weakness and/or achiness, especially in her legs. Perhaps simvastatin at 40 mg will need to be changed to something more potent such as Crestor. If she is having myalgias or myopathy, a change to a water soluble statin would be in order.   Diagnosis updated by automated process. Provider to review and confirm.       CAD (coronary artery disease)      Priority: Medium     Status post LAD stenting 06/28 for unstable angina. She had in-stent restenosis in the LAD with further stenting on 11/03/2010 when she developed recurrent angina and was hospitalized at [facility]. Coronary angiography demonstrated a 60% circumflex ostial stenosis, a 95% proximal LAD in-stent restenosis along with a 50% ostial stenosis that was unchanged. The right coronary artery had a mild 10%-20% luminal narrowing but nothing significant. Her ejection fraction at that time was 45%. The in-stent restenosis was restented with an [place] drug-eluting stent.   Plavix was changed to Effient and the cardiologist, Dr. Torrez, felt that Effient should be continued at least 2 years or preferentially longer term. Nuclear stress test late summer 2011. We will consider a stress test earlier than that should the patient have symptoms.    Follow up with Cardiology in 6 months        Preinfarction syndrome (H)      Priority: Medium     (Problem list name updated by automated process. Provider to review and confirm.)          Past Medical History:    Past Medical History:   Diagnosis Date     Acne rosacea      [place], unstable 2010     CAD (coronary artery disease) 2010     Fibrocystic breast      Hepatitis      Iritis      Menopause      MI (myocardial infarction)      NONSPECIFIC MEDICAL HISTORY 7/14/09     Shingles 05/2017       Past Surgical History:    Past Surgical History:   Procedure Laterality Date     ANGIOGRAM  6/2010     angiogram   "11/3/2010    in stent restenosis     PVD STENTING  2010     x 2, LAD       Family History:    Family History   Problem Relation Age of Onset     Hypertension Mother      Heart Disease Mother         CHF     Alzheimer Disease Father      Hypertension Daughter      High cholesterol Daughter      Breast Cancer No family hx of        Social History:  Marital Status:   [2]  Social History     Tobacco Use     Smoking status: Never Smoker     Smokeless tobacco: Never Used     Tobacco comment: never smoker; non-smoking household   Substance Use Topics     Alcohol use: Yes     Comment: rare     Drug use: No     Comment: never        Medications:      aspirin 81 MG tablet   atorvastatin (LIPITOR) 40 MG tablet   carvedilol (COREG) 12.5 MG tablet   diphenhydrAMINE-acetaminophen (TYLENOL PM)  MG tablet   losartan (COZAAR) 25 MG tablet   nitroGLYcerin (NITROSTAT) 0.4 MG sublingual tablet   ORDER FOR DME   RaNITidine HCl (ZANTAC PO)         Review of Systems  All systems reviewed and other than pertinent positives and negatives in HPI all other systems are negative.  Physical Exam   BP: (!) 203/100  Pulse: 72  Temp: 97.9  F (36.6  C)  Height: 162.6 cm (5' 4\")  Weight: 86.2 kg (190 lb)  SpO2: 96 %      Physical Exam   Constitutional: She is oriented to person, place, and time. She appears well-developed and well-nourished. No distress.   HENT:   Head: Normocephalic.   Mouth/Throat: Oropharynx is clear and moist.   Eyes: Conjunctivae are normal.   Neck: Normal range of motion. Neck supple.   Cardiovascular: Normal rate, regular rhythm, normal heart sounds and intact distal pulses.   No murmur heard.  Pulmonary/Chest: Effort normal and breath sounds normal. No stridor. No respiratory distress. She has no wheezes. She exhibits no tenderness.   Abdominal: Soft. Bowel sounds are normal. There is no tenderness.   Musculoskeletal: Normal range of motion. She exhibits no edema.   Neurological: She is oriented to person, place, " and time. She exhibits normal muscle tone.   Skin: Skin is warm and dry. Capillary refill takes less than 2 seconds. No rash noted.   Psychiatric: She has a normal mood and affect.   Nursing note and vitals reviewed.      ED Course        Procedures               EKG Interpretation:      Interpreted by Nile Haddad  Rhythm: normal sinus   Rate: normal  Axis: normal  Ectopy: none  Conduction: normal  ST Segments/ T Waves: No ST-T wave changes  Q Waves: none  Comparison to prior: Unchanged from 5/12/16    Clinical Impression: normal EKG          Critical Care time:  none               Results for orders placed or performed during the hospital encounter of 05/29/19 (from the past 24 hour(s))   CBC with platelets differential   Result Value Ref Range    WBC 5.8 4.0 - 11.0 10e9/L    RBC Count 4.55 3.8 - 5.2 10e12/L    Hemoglobin 14.4 11.7 - 15.7 g/dL    Hematocrit 44.3 35.0 - 47.0 %    MCV 97 78 - 100 fl    MCH 31.6 26.5 - 33.0 pg    MCHC 32.5 31.5 - 36.5 g/dL    RDW 12.5 10.0 - 15.0 %    Platelet Count 313 150 - 450 10e9/L    Diff Method Automated Method     % Neutrophils 54.0 %    % Lymphocytes 30.9 %    % Monocytes 9.7 %    % Eosinophils 4.3 %    % Basophils 0.9 %    % Immature Granulocytes 0.2 %    Nucleated RBCs 0 0 /100    Absolute Neutrophil 3.1 1.6 - 8.3 10e9/L    Absolute Lymphocytes 1.8 0.8 - 5.3 10e9/L    Absolute Monocytes 0.6 0.0 - 1.3 10e9/L    Absolute Eosinophils 0.3 0.0 - 0.7 10e9/L    Absolute Basophils 0.1 0.0 - 0.2 10e9/L    Abs Immature Granulocytes 0.0 0 - 0.4 10e9/L    Absolute Nucleated RBC 0.0    Comprehensive metabolic panel   Result Value Ref Range    Sodium 143 133 - 144 mmol/L    Potassium 4.4 3.4 - 5.3 mmol/L    Chloride 109 94 - 109 mmol/L    Carbon Dioxide 30 20 - 32 mmol/L    Anion Gap 4 3 - 14 mmol/L    Glucose 102 (H) 70 - 99 mg/dL    Urea Nitrogen 13 7 - 30 mg/dL    Creatinine 0.90 0.52 - 1.04 mg/dL    GFR Estimate 65 >60 mL/min/[1.73_m2]    GFR Estimate If Black 75 >60  mL/min/[1.73_m2]    Calcium 9.2 8.5 - 10.1 mg/dL    Bilirubin Total 0.5 0.2 - 1.3 mg/dL    Albumin 3.9 3.4 - 5.0 g/dL    Protein Total 7.8 6.8 - 8.8 g/dL    Alkaline Phosphatase 204 (H) 40 - 150 U/L    ALT 25 0 - 50 U/L    AST 17 0 - 45 U/L   Troponin I   Result Value Ref Range    Troponin I ES <0.015 0.000 - 0.045 ug/L   Chest XR,  PA & LAT    Narrative    CHEST TWO VIEWS  5/29/2019 10:30 AM     HISTORY: Chest pain.    COMPARISON: 5/12/2016.      Impression    IMPRESSION: No acute cardiopulmonary disease.    JV ELLER MD   UA reflex to Microscopic   Result Value Ref Range    Color Urine Straw     Appearance Urine Clear     Glucose Urine Negative NEG^Negative mg/dL    Bilirubin Urine Negative NEG^Negative    Ketones Urine Negative NEG^Negative mg/dL    Specific Gravity Urine 1.008 1.003 - 1.035    Blood Urine Negative NEG^Negative    pH Urine 7.0 5.0 - 7.0 pH    Protein Albumin Urine Negative NEG^Negative mg/dL    Urobilinogen mg/dL 0.0 0.0 - 2.0 mg/dL    Nitrite Urine Negative NEG^Negative    Leukocyte Esterase Urine Large (A) NEG^Negative    Source Midstream Urine     RBC Urine 1 0 - 2 /HPF    WBC Urine 3 0 - 5 /HPF    Squamous Epithelial /HPF Urine 2 (H) 0 - 1 /HPF    Mucous Urine Present (A) NEG^Negative /LPF    Hyaline Casts 1 0 - 2 /LPF   Troponin I   Result Value Ref Range    Troponin I ES <0.015 0.000 - 0.045 ug/L     Results for orders placed or performed during the hospital encounter of 05/29/19   Chest XR,  PA & LAT    Narrative    CHEST TWO VIEWS  5/29/2019 10:30 AM     HISTORY: Chest pain.    COMPARISON: 5/12/2016.      Impression    IMPRESSION: No acute cardiopulmonary disease.    JV ELLER MD       Medications   nitroGLYcerin (NITROSTAT) sublingual tablet 0.4 mg (0.4 mg Sublingual Given 5/29/19 1003)   morphine (PF) injection 4 mg (4 mg Intravenous Given 5/29/19 1101)       Assessments & Plan (with Medical Decision Making) records were reviewed.  EKG labs and chest x-ray were obtained.  EKG  revealed no acute abnormalities.  Patient was given nitroglycerin sublingual with no significant change.  She was therefore given morphine 4 mg IV.  Chest x-ray revealed no obvious acute infiltrate.  She is white count was 5.8.  Hemoglobin 14.4.  White count 313 there was no left shift.  Comprehensive metabolic panel without significant  abnormality.  Urine analysis with a large leukocyte esterase otherwise unremarkable.  Repeat troponin was done 2 hours after the initial EKG this was also negative.  Due to the patient's cardiac history in fact she has been having increased pain at rest over the past few days and this 1 was not controlled with nitro.  I discussed case with Dr. byers cardiologist at [facility].  He reviewed the case in detail with me and felt a stress test was warranted he recommended trying to get a stress echo done this afternoon but this was not available at this time.  He therefore recommended continued cardiac rule out and stress test in the morning.  Patient's pain continued to be resolved after the morphine and findings were discussed in detail.  She is in agreement with admission and further evaluation.  It took several hours to determine if there would be a bed available at [facility] for admission but a bed eventually became available.  Findings were discussed with Joan GILLESPIE with hospitalist services she is to agree with admission of the patient for further evaluation and care.     I have reviewed the nursing notes.    I have reviewed the findings, diagnosis, plan and need for follow up with the patient.          Medication List      There are no discharge medications for this visit.         Final diagnoses:   Chest pain, unspecified type       5/29/2019   [facility] EMERGENCY DEPARTMENT     Nile Haddad MD  05/30/19 [id number]

## 2019-05-30 ENCOUNTER — APPOINTMENT (OUTPATIENT)
Dept: CARDIOLOGY | Facility: CLINIC | Age: 69
End: 2019-05-30
Attending: PHYSICIAN ASSISTANT
Payer: MEDICARE

## 2019-05-30 VITALS
BODY MASS INDEX: 34.63 KG/M2 | HEART RATE: 74 BPM | OXYGEN SATURATION: 94 % | WEIGHT: 202.82 LBS | HEIGHT: 64 IN | DIASTOLIC BLOOD PRESSURE: 76 MMHG | TEMPERATURE: 97.9 F | SYSTOLIC BLOOD PRESSURE: 140 MMHG | RESPIRATION RATE: 18 BRPM

## 2019-05-30 LAB
MRSA DNA SPEC QL NAA+PROBE: NEGATIVE
SPECIMEN SOURCE: NORMAL
TROPONIN I SERPL-MCNC: <0.015 UG/L (ref 0–0.04)

## 2019-05-30 PROCEDURE — 40000264 ECHO STRESS ECHOCARDIOGRAM

## 2019-05-30 PROCEDURE — 93325 DOPPLER ECHO COLOR FLOW MAPG: CPT | Mod: 26 | Performed by: INTERNAL MEDICINE

## 2019-05-30 PROCEDURE — 99207 ZZC CDG-CODE CATEGORY CHANGED: CPT | Performed by: FAMILY MEDICINE

## 2019-05-30 PROCEDURE — 93350 STRESS TTE ONLY: CPT | Mod: 26 | Performed by: INTERNAL MEDICINE

## 2019-05-30 PROCEDURE — 93018 CV STRESS TEST I&R ONLY: CPT | Performed by: INTERNAL MEDICINE

## 2019-05-30 PROCEDURE — A9270 NON-COVERED ITEM OR SERVICE: HCPCS | Performed by: PHYSICIAN ASSISTANT

## 2019-05-30 PROCEDURE — 84484 ASSAY OF TROPONIN QUANT: CPT | Performed by: EMERGENCY MEDICINE

## 2019-05-30 PROCEDURE — 99217 ZZC OBSERVATION CARE DISCHARGE: CPT | Performed by: FAMILY MEDICINE

## 2019-05-30 PROCEDURE — 36415 COLL VENOUS BLD VENIPUNCTURE: CPT | Performed by: EMERGENCY MEDICINE

## 2019-05-30 PROCEDURE — 25500064 ZZH RX 255 OP 636: Performed by: FAMILY MEDICINE

## 2019-05-30 PROCEDURE — G0378 HOSPITAL OBSERVATION PER HR: HCPCS

## 2019-05-30 PROCEDURE — 25000132 ZZH RX MED GY IP 250 OP 250 PS 637: Performed by: PHYSICIAN ASSISTANT

## 2019-05-30 PROCEDURE — 93016 CV STRESS TEST SUPVJ ONLY: CPT | Performed by: INTERNAL MEDICINE

## 2019-05-30 PROCEDURE — 93321 DOPPLER ECHO F-UP/LMTD STD: CPT | Mod: 26 | Performed by: INTERNAL MEDICINE

## 2019-05-30 RX ADMIN — ASPIRIN 81 MG 81 MG: 81 TABLET ORAL at 08:39

## 2019-05-30 RX ADMIN — ACETAMINOPHEN: 325 TABLET, FILM COATED ORAL at 00:08

## 2019-05-30 RX ADMIN — LOSARTAN POTASSIUM 25 MG: 25 TABLET ORAL at 00:09

## 2019-05-30 RX ADMIN — ATORVASTATIN CALCIUM 40 MG: 20 TABLET, FILM COATED ORAL at 00:09

## 2019-05-30 RX ADMIN — RANITIDINE 150 MG: 150 TABLET ORAL at 00:09

## 2019-05-30 RX ADMIN — LOSARTAN POTASSIUM 25 MG: 25 TABLET ORAL at 08:41

## 2019-05-30 RX ADMIN — HUMAN ALBUMIN MICROSPHERES AND PERFLUTREN 9 ML: 10; .22 INJECTION, SOLUTION INTRAVENOUS at 10:07

## 2019-05-30 ASSESSMENT — MIFFLIN-ST. JEOR: SCORE: 1425

## 2019-05-30 NOTE — PLAN OF CARE
"@ 1940 Pt had c/o chest pain rating 2/10 on the left side of her chest radiating up through her neck and into her jaw. Same pain that brought her in with less intensity. EKG obtained which was unchanged. GI cocktail given which pt stated took the \"edge off\" but did not resolve the pain completely. 2 mg IV Morphine given which brought c/p to 0. Pain returned @2300 and was given 4 mg Morphine which was effective. Pt was at rest with both instances. Denied SOB or nausea. Nothing made the pain better or worse (other than the medications that helped). Pain has not returned as of 0600. Pt reports sleeping well. VSS. On RA. Afebrile. Saline locked.   "

## 2019-05-30 NOTE — DISCHARGE SUMMARY
[facility]  Hospitalist Discharge Summary       Date of Admission:  5/29/2019  Date of Discharge:  5/30/2019  Discharging Provider: Hoa Martinez MD      Discharge Diagnoses   Chest pain, non-cardiac  History of Coronary Artery Disease  Hyperlipidemia  Hypertension  GERD      Follow-ups Needed After Discharge   Follow-up Appointments     Follow-up and recommended labs and tests       Follow up with primary care provider, Kervin Lin, within 7 days for   hospital follow- up.  No follow up labs or test are needed.  Consider   further work up for esophageal reflux.           Consider work up for the GERD and consider increasing to PPI. I did have her increase her zantac to 150 mg twice daily.     Unresulted Labs Ordered in the Past 30 Days of this Admission     No orders found for last 61 day(s).      These results will be followed up by PCP    Discharge Disposition   Discharged to home  Condition at discharge: Stable    Hospital Course    Chest pain  CAD (coronary artery disease), s/p PCI 2010  Hyperlipidemia with target LDL less than 100  History of PCI in 2010. Follows with cardiology, last visit 9/13/18 with Dr. Gillespie. Managed prior to admission with aspirin 81 mg daily, Lipitor 40 mg daily, carvedilol 12.5 mg bid, losartan 25 mg bid, and prn nitroglycerine. Daily chest pain for past 4 days. On day of admission had chest pain not relieved by nitroglycerine x3, relieved by morphine. EKG with normal sinus rhythm, no ST changes; repeat EKG unchanged. Troponin negative x3. Chest x-ray unremarkable. History sounds convincing for possible acute coronary syndrome.  - Stress echo tomorrow (patient will try treadmill, but may need to convert to nuclear scan)  - Monitor on telemetry  - Trend troponins  - Hold prior to admission carvedilol prior to stress  - Prn nitroglycerine and morphine available, as well as GI cocktail    5/30/2019 stress echo is normal, normal EF, no wall motion  abnormalities, EKG portion also normal with no inducible ischemia     Hypertension goal BP (blood pressure) < 140/90  Pressures reviewed, elevated. Managed prior to admission with carvedilol 12.5 mg bid and losartan 25 mg bid.   - Hold carvedilol as above  - Continue prior to admission losartan  5/30/2019 resume all home medications     Gastroesophageal reflux disease  Symptoms possibly related to GERD, but concerning enough and unable to rule out acute coronary syndrome. Patient takes Zantac 150 mg q hs, continue.  - Prn GI cocktail also available  5/30/2019 increase zantac to twice daily and consider PPI and EGD as outpatient if needed         Consultations This Hospital Stay   CARE TRANSITION RN/SW IP CONSULT    Code Status   Full Code    Time Spent on this Encounter   I, Hoa Martinez, personally saw the patient today and spent greater than 30 minutes discharging this patient.       Hoa Martinez MD  [facility]  ______________________________________________________________________    Physical Exam   Vital Signs: Temp: 97.9  F (36.6  C) Temp src: Oral BP: 140/76 Pulse: 74 Heart Rate: 74 Resp: 18 SpO2: 94 % O2 Device: None (Room air)    Weight: 202 lbs 13.17 oz  Constitutional: awake, alert, cooperative, no apparent distress, and appears stated age  Eyes: Lids and lashes normal, pupils equal, round and reactive to light, extra ocular muscles intact, sclera clear, conjunctiva normal  Respiratory: No increased work of breathing, good air exchange, clear to auscultation bilaterally, no crackles or wheezing  GI: No scars, normal bowel sounds, soft, non-distended, non-tender, no masses palpated, no hepatosplenomegally  Skin: no bruising or bleeding  Musculoskeletal: There is no redness, warmth, or swelling of the joints.  Full range of motion noted.  Motor strength is 5 out of 5 all extremities bilaterally.  Tone is normal.  Neuropsychiatric: General: normal, calm and normal eye  contact  Level of consciousness: alert / normal       Primary Care Physician   Kervin Lin    Discharge Orders      Reason for your hospital stay    Recurrent chest pain, admitted to rule out acute coronary syndrome - stress echocardiogram was normal.     Follow-up and recommended labs and tests     Follow up with primary care provider, Kervin Lin, within 7 days for hospital follow- up.  No follow up labs or test are needed.  Consider further work up for esophageal reflux.     Activity    Your activity upon discharge: activity as tolerated     Full Code     Diet    Follow this diet upon discharge: regular adult diet       Significant Results and Procedures   Results for orders placed or performed during the hospital encounter of 05/29/19   Chest XR,  PA & LAT    Narrative    CHEST TWO VIEWS  5/29/2019 10:30 AM     HISTORY: Chest pain.    COMPARISON: 5/12/2016.      Impression    IMPRESSION: No acute cardiopulmonary disease.    JV ELLER MD       Discharge Medications   Current Discharge Medication List      CONTINUE these medications which have CHANGED    Details   ranitidine (ZANTAC) 150 MG tablet Take 1 tablet (150 mg) by mouth 2 times daily  Qty: 1 tablet, Refills: 0         CONTINUE these medications which have NOT CHANGED    Details   aspirin 81 MG tablet Take 81 mg by mouth daily      atorvastatin (LIPITOR) 40 MG tablet Take 1 tablet (40 mg) by mouth daily  Qty: 90 tablet, Refills: 3      carvedilol (COREG) 12.5 MG tablet Take 1 tablet (12.5 mg) by mouth 2 times daily (with meals)  Qty: 180 tablet, Refills: 3      diphenhydrAMINE-acetaminophen (TYLENOL PM)  MG tablet Take 1 tablet by mouth At Bedtime.      losartan (COZAAR) 25 MG tablet Take one pill twice daily  Qty: 180 tablet, Refills: 3      nitroGLYcerin (NITROSTAT) 0.4 MG sublingual tablet Place 1 tablet (0.4 mg) under the tongue every 5 minutes as needed for chest pain  Qty: 25 tablet, Refills: 1      ORDER FOR DME Blood pressure  cuff  Qty: 1 Device, Refills: 0    Associated Diagnoses: HTN (hypertension)           Allergies   Allergies   Allergen Reactions     Sulfa Drugs Rash

## 2019-05-30 NOTE — H&P
[place]    History and Physical - Hospitalist Service       Date of Admission:  5/29/2019    Assessment & Plan   Sharri Elaine is a 68 year old female admitted on 5/29/2019. She has a past medical history significant for coronary artery disease s/p PCI and GERD who presented to the emergency department for evaluation of chest pain.    Chest pain  CAD (coronary artery disease), s/p PCI 2010  Hyperlipidemia with target LDL less than 100  History of PCI in 2010. Follows with cardiology, last visit 9/13/18 with Dr. Gillespie. Managed prior to admission with aspirin 81 mg daily, Lipitor 40 mg daily, carvedilol 12.5 mg bid, losartan 25 mg bid, and prn nitroglycerine. Daily chest pain for past 4 days. On day of admission had chest pain not relieved by nitroglycerine x3, relieved by morphine. EKG with normal sinus rhythm, no ST changes; repeat EKG unchanged. Troponin negative x3. Chest x-ray unremarkable. History sounds convincing for possible acute coronary syndrome.  - Stress echo tomorrow (patient will try treadmill, but may need to convert to nuclear scan)  - Monitor on telemetry  - Trend troponins  - Hold prior to admission carvedilol prior to stress  - Prn nitroglycerine and morphine available, as well as GI cocktail    Hypertension goal BP (blood pressure) < 140/90  Pressures reviewed, elevated. Managed prior to admission with carvedilol 12.5 mg bid and losartan 25 mg bid.   - Hold carvedilol as above  - Continue prior to admission losartan    Gastroesophageal reflux disease  Symptoms possibly related to GERD, but concerning enough and unable to rule out acute coronary syndrome. Patient takes Zantac 150 mg q hs, continue.  - Prn GI cocktail also available       Diet: Combination Diet Low Saturated Fat Na <2400mg Diet, No Caffeine Diet    DVT Prophylaxis: Low Risk/Ambulatory with no VTE prophylaxis indicated  Goldman Catheter: not present  Code Status: Full Code  - discussed with  patient upon admission    Disposition Plan   Expected discharge: Tomorrow, recommended to prior living arrangement once stress test completed.  Entered: Daria Black PA-C 05/29/2019, 11:27 PM     The patient's care was discussed with the Attending Physician, Dr. Carlos Ramirez. Plan also discussed with patient.    Daria Black PA-C  [facility]    ______________________________________________________________________    Chief Complaint   Chest pain    History is obtained from the patient, review of EMR, and emergency department sign out with Dr. Haddad.    History of Present Illness   Sharri Elaine is a 68 year old female who presented to the emergency department for chest pain.    Patient has a history of coronary artery disease and had 3 stents placed in 2010.  She follows with cardiology, Dr. Gillespie, last visit on 9/13/2018.  Since her previous stents her chest pain has been well managed.  She would occasionally need to take a nitroglycerin about once a month, and pain always resolved.    For the past 4 days, patient has been waking up in the morning with chest pain.  The past 4 days she took a nitroglycerin and the pain resolved.  This morning she woke with the pain, but it did not resolve even after 2 nitroglycerin so she went to the emergency department.  She was given another nitroglycerin without relief, then morphine and the pain resolved.  Pain has returned one time after admission, was slightly improved with a GI cocktail but then fully resolved with morphine.  She is currently chest pain-free.    Pain is located near the left sternum with radiation up near her clavicle and into her jaw, occasionally into her left shoulder.  No right-sided complaints.  When present, pain is constant described as a radiating pain and occasionally a brief stabbing pain rated 3 out of 10.  Typically nitroglycerin and Tums have helped in the past, but did not help today.   Pain is not brought on by exertion, but she feels it may be related to stress.    Patient was able to clean her house yesterday without development of chest pain.  However, she feels that she fatigues very easily and needs to stop and take breaks.  She denies any associated diaphoresis, nausea, lightheadedness, or shortness of breath.    Review of systems she feels that she has been recently constipated.  The remainder review of systems is negative.    Review of Systems    The 10 point Review of Systems is negative other than noted in the HPI or here.     Past Medical History    I have reviewed this patient's medical history and updated it with pertinent information if needed.   Past Medical History:   Diagnosis Date     Acne rosacea      [place], unstable 2010     CAD (coronary artery disease) 2010    LAD stent x 2     Fibrocystic breast      Hepatitis     age 18     Iritis     14 YEARS OLD     Menopause     AN HRT     MI (myocardial infarction)     non T wave MI     NONSPECIFIC MEDICAL HISTORY 7/14/09    HEART MURMUR- NORMAL ECHO     Shingles 05/2017       Past Surgical History   I have reviewed this patient's surgical history and updated it with pertinent information if needed.  Past Surgical History:   Procedure Laterality Date     ANGIOGRAM  6/2010     angiogram  11/3/2010    in stent restenosis     PVD STENTING  2010     x 2, LAD       Social History   I have reviewed this patient's social history and updated it with pertinent information if needed.  Social History     Tobacco Use     Smoking status: Never Smoker     Smokeless tobacco: Never Used     Tobacco comment: never smoker; non-smoking household   Substance Use Topics     Alcohol use: Yes     Comment: rare     Drug use: No     Comment: never       Family History   I have reviewed this patient's family history and updated it with pertinent information if needed.   Family History   Problem Relation Age of Onset     Hypertension Mother      Heart Disease  Mother         CHF     Alzheimer Disease Father      Hypertension Daughter      High cholesterol Daughter      Breast Cancer No family hx of        Prior to Admission Medications   Prior to Admission Medications   Prescriptions Last Dose Informant Patient Reported? Taking?   ORDER FOR DME  Self No No   Sig: Blood pressure cuff   RaNITidine HCl (ZANTAC PO) 5/28/2019 at hs  Yes Yes   Sig: Take 150 mg by mouth At Bedtime    aspirin 81 MG tablet 5/28/2019 at am  Yes Yes   Sig: Take 81 mg by mouth daily   atorvastatin (LIPITOR) 40 MG tablet 5/28/2019 at hs  No Yes   Sig: Take 1 tablet (40 mg) by mouth daily   carvedilol (COREG) 12.5 MG tablet 5/28/2019 at hs  No Yes   Sig: Take 1 tablet (12.5 mg) by mouth 2 times daily (with meals)   diphenhydrAMINE-acetaminophen (TYLENOL PM)  MG tablet 5/28/2019 at hs Self Yes Yes   Sig: Take 1 tablet by mouth At Bedtime.   losartan (COZAAR) 25 MG tablet 5/28/2019 at hs  No Yes   Sig: Take one pill twice daily   nitroGLYcerin (NITROSTAT) 0.4 MG sublingual tablet 5/29/2019 at am  No Yes   Sig: Place 1 tablet (0.4 mg) under the tongue every 5 minutes as needed for chest pain      Facility-Administered Medications: None     Allergies   Allergies   Allergen Reactions     Sulfa Drugs Rash       Physical Exam   Vital Signs: Temp: 97.4  F (36.3  C) Temp src: Oral BP: 155/83 Pulse: 67 Heart Rate: 70 Resp: 15 SpO2: 96 % O2 Device: None (Room air)    Weight: 201 lbs 15.06 oz    Constitutional: Alert, oriented, cooperative, no apparent distress, appears nontoxic, speaking in full sentences.     Eyes: Eyes are clear, pupils are reactive. No scleral icterus. Extroccular movements intact.     HEENT: Oropharynx is clear and moist, no lesions. Normocephalic, no evidence of cranial trauma.      Cardiovascular: Regular rhythm and rate, normal S1 and S2. No murmur, rubs, or gallops. Peripheral pulses intact bilaterally. No lower extremity edema.    Respiratory: Lung sounds are clear to auscultation  bilaterally without wheezes, rhonchi, or crackles.    GI: Soft, non-distended. Non-tender, no rebound or guarding. No hepatosplenomegaly or masses appreciated. Normal bowel sounds.     Musculoskeletal: Without obvious deformity, normal range of motion. Normal muscle bulk and tone. Distal CMS intact. No pain with palpation of chest.     Skin: Warm and dry, no rashes or ecchymoses. No mottling of skin.      Neurologic: Patient moves all extremities. Cranial nerves are grossly intact.  is symmetric. Gross strength and sensation are equal bilaterally.    Genitourinary: Deferred      Data   Data reviewed today: I reviewed all medications, new labs and imaging results over the last 24 hours. I personally reviewed the EKG tracing showing normal sinus rhythm without ST changes.    Recent Labs   Lab 05/29/19  1811 05/29/19  1204 05/29/19  0949   WBC  --   --  5.8   HGB  --   --  14.4   MCV  --   --  97   PLT  --   --  313   NA  --   --  143   POTASSIUM  --   --  4.4   CHLORIDE  --   --  109   CO2  --   --  30   BUN  --   --  13   CR  --   --  0.90   ANIONGAP  --   --  4   YASSINE  --   --  9.2   GLC  --   --  102*   ALBUMIN  --   --  3.9   PROTTOTAL  --   --  7.8   BILITOTAL  --   --  0.5   ALKPHOS  --   --  204*   ALT  --   --  25   AST  --   --  17   TROPI <0.015 <0.015 <0.015     Recent Results (from the past 24 hour(s))   Chest XR,  PA & LAT    Narrative    CHEST TWO VIEWS  5/29/2019 10:30 AM     HISTORY: Chest pain.    COMPARISON: 5/12/2016.      Impression    IMPRESSION: No acute cardiopulmonary disease.    JV ELLER MD

## 2019-05-30 NOTE — PROGRESS NOTES
[facility] DISCHARGE NOTE    Patient discharged to home at 11:50 AM via ambulation. Accompanied by spouse and staff. Discharge instructions reviewed with patient, opportunity offered to ask questions. Prescriptions - None ordered for discharge. All belongings sent with patient.    Emily Robles

## 2019-06-03 ENCOUNTER — TELEPHONE (OUTPATIENT)
Dept: FAMILY MEDICINE | Facility: CLINIC | Age: 69
End: 2019-06-03

## 2019-06-03 NOTE — TELEPHONE ENCOUNTER
"Hospital/TCU/ED for chronic condition Discharge Protocol    \"Hi, my name is Sonia Arevalo, a registered nurse, and I am calling from [facility].  I am calling to follow up and see how things are going for you after your recent emergency visit/hospital/TCU stay.\"    Tell me how you are doing now that you are home?\" States feeling better, less heartburn/ pain since increasing zantac to BID.       Discharge Instructions    \"Let's review your discharge instructions.  What is/are the follow-up recommendations?  Pt. Response: F/U with PCP for further GERD work up    \"Has an appointment with your primary care provider been scheduled?\"   No (schedule appointment) States will monitor sx and F/U as needed.    \"When you see the provider, I would recommend that you bring your medications with you.\"    Medications    \"Tell me what changed about your medicines when you discharged?\"    Changes to chronic meds?    0-1    \"What questions do you have about your medications?\"    None     New diagnoses of heart failure, COPD, diabetes, or MI?    No     Post Discharge Medication Reconciliation Status: discharge medications reconciled, continue medications without change.         Was MTM referral placed (*Make sure to put transitions as reason for referral)?   No    Call Summary    \"What questions or concerns do you have about your recent visit and your follow-up care?\"     none    \"If you have questions or things don't continue to improve, we encourage you contact us through the main clinic number (give number).  Even if the clinic is not open, triage nurses are available 24/7 to help you.     We would like you to know that our clinic has extended hours (provide information).  We also have urgent care (provide details on closest location and hours/contact info)\"      \"Thank you for your time and take care!\"           "

## 2019-06-03 NOTE — TELEPHONE ENCOUNTER
ED / Discharge Outreach Protocol    Patient Contact    Attempt # 1    Was call answered?  No.  Left message on voicemail with information to call me back.  ISAAC Arevalo RN

## 2019-07-15 ENCOUNTER — ANCILLARY PROCEDURE (OUTPATIENT)
Dept: MAMMOGRAPHY | Facility: CLINIC | Age: 69
End: 2019-07-15
Attending: FAMILY MEDICINE
Payer: COMMERCIAL

## 2019-07-15 DIAGNOSIS — Z12.31 VISIT FOR SCREENING MAMMOGRAM: ICD-10-CM

## 2019-07-15 PROCEDURE — 77067 SCR MAMMO BI INCL CAD: CPT | Mod: TC

## 2019-07-15 PROCEDURE — 77063 BREAST TOMOSYNTHESIS BI: CPT

## 2019-09-05 ENCOUNTER — OFFICE VISIT (OUTPATIENT)
Dept: CARDIOLOGY | Facility: CLINIC | Age: 69
End: 2019-09-05
Payer: COMMERCIAL

## 2019-09-05 VITALS
BODY MASS INDEX: 34.33 KG/M2 | WEIGHT: 200 LBS | HEART RATE: 68 BPM | OXYGEN SATURATION: 96 % | SYSTOLIC BLOOD PRESSURE: 140 MMHG | DIASTOLIC BLOOD PRESSURE: 70 MMHG

## 2019-09-05 DIAGNOSIS — E78.5 HYPERLIPIDEMIA WITH TARGET LDL LESS THAN 100: ICD-10-CM

## 2019-09-05 DIAGNOSIS — Z98.61 S/P PTCA (PERCUTANEOUS TRANSLUMINAL CORONARY ANGIOPLASTY): ICD-10-CM

## 2019-09-05 DIAGNOSIS — I10 HYPERTENSION GOAL BP (BLOOD PRESSURE) < 140/90: Primary | ICD-10-CM

## 2019-09-05 PROCEDURE — 99213 OFFICE O/P EST LOW 20 MIN: CPT | Performed by: INTERNAL MEDICINE

## 2019-09-05 RX ORDER — LOSARTAN POTASSIUM 25 MG/1
TABLET ORAL
Qty: 180 TABLET | Refills: 3 | Status: SHIPPED | OUTPATIENT
Start: 2019-09-05 | End: 2020-09-10

## 2019-09-05 RX ORDER — ATORVASTATIN CALCIUM 40 MG/1
40 TABLET, FILM COATED ORAL DAILY
Qty: 90 TABLET | Refills: 3 | Status: SHIPPED | OUTPATIENT
Start: 2019-09-05 | End: 2020-09-10

## 2019-09-05 RX ORDER — CARVEDILOL 12.5 MG/1
12.5 TABLET ORAL 2 TIMES DAILY WITH MEALS
Qty: 180 TABLET | Refills: 3 | Status: SHIPPED | OUTPATIENT
Start: 2019-09-05 | End: 2020-09-10

## 2019-09-05 NOTE — NURSING NOTE
"Chief Complaint   Patient presents with     Coronary Artery Disease     one year follow up for CAD. - dr Per patient dizzinesws and fatigue at times .       Initial BP (!) 151/65 (BP Location: Right arm, Patient Position: Sitting, Cuff Size: Adult Large)   Pulse 68   Wt 90.7 kg (200 lb)   SpO2 96%   BMI 34.33 kg/m   Estimated body mass index is 34.33 kg/m  as calculated from the following:    Height as of 5/29/19: 1.626 m (5' 4\").    Weight as of this encounter: 90.7 kg (200 lb)..  BP completed using cuff size: large    Dane Murray L.P.N.    "

## 2019-09-05 NOTE — PATIENT INSTRUCTIONS
Thank you for coming to the [facility]; please note the following instructions:    1. Preventive Care:    Colorectal Cancer Screening: During our visit today, we discussed that it is recommended you receive colorectal cancer screening. Please call or make an appointment with your primary care provider to discuss this. You may also call the [facility] scheduling line ([phone]) to set up a colonoscopy appointment.    2.  All cardiology medication have been filled and sent to your preferred pharmacy    2.  Follow up in 1 year.  We will contact you when the time gets closer.        If you have any questions regarding your visit please contact your care team:     Cardiology  Telephone Number   Tamiko LORENZO., RN  Marni QUIGLEY,RN  Loraine GRAY, SUZIE CANO, MA  Dane SETH, GARYN   [phone]    *After hours: [phone]   For scheduling appts:     [phone] or    [phone] (select option 1)    *After hours: [phone]     For the Device Clinic (Pacemakers and ICD's)  RN's :  Nae Hodge   During business hours: [phone]    *After business hours:  [phone] (select option 4)      Normal test result notifications will be released via Tango or mailed within 7 business days.  All other test results, will be communicated via telephone once reviewed by your cardiologist.    If you need a medication refill please contact your pharmacy.  Please allow 3 business days for your refill to be completed.    As always, thank you for trusting us with your health care needs!

## 2019-09-05 NOTE — PROGRESS NOTES
SUBJECTIVE:  Sharri Elaine is a 69 year old female who presents for follow-up.    S/P pLAD stent at [facility] in 6/2010. Had repeat stent for ISR in 10/2010. Since then exertional symptoms,but have intermittent  Chest pain at rest which get zaria when she walk around. Take S/L NTG and pain goes away in 20 minutes.   This may she was evaluated for chest pain.  Had a normal exercise stress echocardiogram.  Currently patient is active and asymptomatic.  Patient Active Problem List    Diagnosis Date Noted     Health Care Home 06/28/2011     Priority: High     DX V65.8 REPLACED WITH 20255 HEALTH CARE HOME (04/08/2013)       Chest pain 05/12/2016     Priority: Medium     Acute chest pain 05/12/2016     Priority: Medium     Advanced directives, counseling/discussion 05/04/2012     Priority: Medium     Advance Directive received and scanned. Click on Code in the patient header to view. 5/4/2012          Rosacea 08/20/2010     Priority: Medium     Hypertension goal BP (blood pressure) < 140/90 07/14/2010     Priority: Medium     Goal <130/80       Hyperlipidemia with target LDL less than 100 07/14/2010     Priority: Medium     January 4, 2011 - . I have asked this patient to obtain a fasting lipid profile tomorrow in Ghada Bermeo's office. She is complaining of some muscle weakness and/or achiness, especially in her legs. Perhaps simvastatin at 40 mg will need to be changed to something more potent such as Crestor. If she is having myalgias or myopathy, a change to a water soluble statin would be in order.   Diagnosis updated by automated process. Provider to review and confirm.       CAD (coronary artery disease)      Priority: Medium     Status post LAD stenting 06/28 for unstable angina. She had in-stent restenosis in the LAD with further stenting on 11/03/2010 when she developed recurrent angina and was hospitalized at [facility]. Coronary angiography demonstrated a 60% circumflex ostial stenosis, a  95% proximal LAD in-stent restenosis along with a 50% ostial stenosis that was unchanged. The right coronary artery had a mild 10%-20% luminal narrowing but nothing significant. Her ejection fraction at that time was 45%. The in-stent restenosis was restented with an [place] drug-eluting stent.   Plavix was changed to Effient and the cardiologist, Dr. Torrez, felt that Effient should be continued at least 2 years or preferentially longer term. Nuclear stress test late summer 2011. We will consider a stress test earlier than that should the patient have symptoms.    Follow up with Cardiology in 6 months        Preinfarction syndrome (H)      Priority: Medium     (Problem list name updated by automated process. Provider to review and confirm.)      .  Current Outpatient Medications   Medication Sig     aspirin 81 MG tablet Take 81 mg by mouth daily     atorvastatin (LIPITOR) 40 MG tablet Take 1 tablet (40 mg) by mouth daily     carvedilol (COREG) 12.5 MG tablet Take 1 tablet (12.5 mg) by mouth 2 times daily (with meals)     diphenhydrAMINE-acetaminophen (TYLENOL PM)  MG tablet Take 1 tablet by mouth At Bedtime.     losartan (COZAAR) 25 MG tablet Take one pill twice daily     ranitidine (ZANTAC) 150 MG tablet Take 1 tablet (150 mg) by mouth 2 times daily     nitroGLYcerin (NITROSTAT) 0.4 MG sublingual tablet Place 1 tablet (0.4 mg) under the tongue every 5 minutes as needed for chest pain     ORDER FOR DME Blood pressure cuff     No current facility-administered medications for this visit.      Past Medical History:   Diagnosis Date     Acne rosacea      Anginas, unstable 2010     CAD (coronary artery disease) 2010    LAD stent x 2     Fibrocystic breast      Hepatitis     age 18     Iritis     14 YEARS OLD     Menopause     AN HRT     MI (myocardial infarction) (H)     non T wave MI     NONSPECIFIC MEDICAL HISTORY 7/14/09    HEART MURMUR- NORMAL ECHO     Shingles 05/2017     Past Surgical History:   Procedure  Laterality Date     ANGIOGRAM  6/2010     angiogram  11/3/2010    in stent restenosis     PVD STENTING  2010     x 2, LAD     Allergies   Allergen Reactions     Sulfa Drugs Rash     Social History     Socioeconomic History     Marital status:      Spouse name: Not on file     Number of children: Not on file     Years of education: Not on file     Highest education level: Not on file   Occupational History     Not on file   Social Needs     Financial resource strain: Not on file     Food insecurity:     Worry: Not on file     Inability: Not on file     Transportation needs:     Medical: Not on file     Non-medical: Not on file   Tobacco Use     Smoking status: Never Smoker     Smokeless tobacco: Never Used     Tobacco comment: never smoker; non-smoking household   Substance and Sexual Activity     Alcohol use: Yes     Comment: rare     Drug use: No     Comment: never     Sexual activity: Yes     Partners: Male   Lifestyle     Physical activity:     Days per week: Not on file     Minutes per session: Not on file     Stress: Not on file   Relationships     Social connections:     Talks on phone: Not on file     Gets together: Not on file     Attends Holiness service: Not on file     Active member of club or organization: Not on file     Attends meetings of clubs or organizations: Not on file     Relationship status: Not on file     Intimate partner violence:     Fear of current or ex partner: Not on file     Emotionally abused: Not on file     Physically abused: Not on file     Forced sexual activity: Not on file   Other Topics Concern     Parent/sibling w/ CABG, MI or angioplasty before 65F 55M? No   Social History Narrative     Not on file     Family History   Problem Relation Age of Onset     Hypertension Mother      Heart Disease Mother         CHF     Alzheimer Disease Father      Hypertension Daughter      High cholesterol Daughter      Breast Cancer No family hx of           REVIEW OF SYSTEMS:  General:  negative, fever, chills, night sweats  Skin: negative, acne, rash and scaling  Eyes: negative, double vision, eye pain and photophobia  Ears/Nose/Throat: negative, nasal congestion and purulent rhinorrhea  Respiratory: No dyspnea on exertion, No cough, No hemoptysis and negative  Cardiovascular: negative, palpitations, tachycardia, irregular heart beat, chest pain, exertional chest pain or pressure, paroxysmal nocturnal dyspnea, dyspnea on exertion and orthopnea       OBJECTIVE:  Blood pressure (!) 140/70, pulse 68, weight 90.7 kg (200 lb), SpO2 96 %, not currently breastfeeding.  General Appearance: healthy, alert, active and no distress  Head: Normocephalic. No masses, lesions, tenderness or abnormalities  Eyes: conjuctiva clear, PERRL, EOM intact  Ears: External ears normal. Canals clear. TM's normal.  Nose: Nares normal  Mouth: normal  Neck: Supple, no cervical adenopathy, no thyromegaly  Lungs: clear to auscultation  Cardiac: regular rate and rhythm, normal S1 and S2, no murmur         ASSESSMENT/PLAN:  Patient here for follow-up.  Status post proximal LAD stent in 2010.  Within 4 months patient had a repeat angiogram and restenting for in-stent restenosis of most likely under deployed stent.  Since then remained asymptomatic.  Do have intermittent nonexertional chest pain for which she uses nitroglycerin.  She had symptoms in May of this year.  Patient was evaluated and had a stress test.  Reviewed exercise stress echo.  This was completely normal.  Overall patient is doing well we will continue with the current medications.  Patient will keep an yearly follow-up.  Per orders.   Return to Clinic 1yr.

## 2019-09-05 NOTE — LETTER
9/5/2019      RE: Sharri Elaine  [id number] [address]       Dear Colleague,    Thank you for the opportunity to participate in the care of your patient, Sharri Elaine, at the [facility] AT [facility]. Please see a copy of my visit note below.       SUBJECTIVE:  Sharri Elaine is a 69 year old female who presents for follow-up.    S/P pLAD stent at [facility] in 6/2010. Had repeat stent for ISR in 10/2010. Since then exertional symptoms,but have intermittent  Chest pain at rest which get zaria when she walk around. Take S/L NTG and pain goes away in 20 minutes.   This may she was evaluated for chest pain.  Had a normal exercise stress echocardiogram.  Currently patient is active and asymptomatic.  Patient Active Problem List    Diagnosis Date Noted     Health Care Home 06/28/2011     Priority: High     DX V65.8 REPLACED WITH [id number] HEALTH CARE HOME (04/08/2013)       Chest pain 05/12/2016     Priority: Medium     Acute chest pain 05/12/2016     Priority: Medium     Advanced directives, counseling/discussion 05/04/2012     Priority: Medium       Advance Directive received and scanned. Click on Code in the patient header to view. 5/4/2012

## 2019-10-21 ENCOUNTER — IMMUNIZATION (OUTPATIENT)
Dept: FAMILY MEDICINE | Facility: CLINIC | Age: 69
End: 2019-10-21
Payer: COMMERCIAL

## 2019-10-21 PROCEDURE — 90662 IIV NO PRSV INCREASED AG IM: CPT

## 2019-10-21 PROCEDURE — G0008 ADMIN INFLUENZA VIRUS VAC: HCPCS

## 2020-02-24 ENCOUNTER — HEALTH MAINTENANCE LETTER (OUTPATIENT)
Age: 70
End: 2020-02-24

## 2020-09-10 ENCOUNTER — VIRTUAL VISIT (OUTPATIENT)
Dept: CARDIOLOGY | Facility: CLINIC | Age: 70
End: 2020-09-10
Payer: COMMERCIAL

## 2020-09-10 DIAGNOSIS — Z98.61 STATUS POST PERCUTANEOUS TRANSLUMINAL CORONARY ANGIOPLASTY: Primary | ICD-10-CM

## 2020-09-10 DIAGNOSIS — I10 HYPERTENSION GOAL BP (BLOOD PRESSURE) < 140/90: ICD-10-CM

## 2020-09-10 DIAGNOSIS — E78.5 HYPERLIPIDEMIA WITH TARGET LDL LESS THAN 100: ICD-10-CM

## 2020-09-10 PROCEDURE — 99213 OFFICE O/P EST LOW 20 MIN: CPT | Mod: 95 | Performed by: INTERNAL MEDICINE

## 2020-09-10 RX ORDER — ATORVASTATIN CALCIUM 40 MG/1
40 TABLET, FILM COATED ORAL DAILY
Qty: 90 TABLET | Refills: 3 | Status: SHIPPED | OUTPATIENT
Start: 2020-09-10 | End: 2021-08-25

## 2020-09-10 RX ORDER — CARVEDILOL 12.5 MG/1
12.5 TABLET ORAL 2 TIMES DAILY WITH MEALS
Qty: 180 TABLET | Refills: 3 | Status: SHIPPED | OUTPATIENT
Start: 2020-09-10 | End: 2021-08-25

## 2020-09-10 RX ORDER — LOSARTAN POTASSIUM 25 MG/1
TABLET ORAL
Qty: 180 TABLET | Refills: 3 | Status: SHIPPED | OUTPATIENT
Start: 2020-09-10 | End: 2021-10-26

## 2020-09-10 NOTE — LETTER
"9/10/2020      RE: Sharri Elaine  [address] [address]       Dear Colleague,    Thank you for the opportunity to participate in the care of your patient, Sharri Elaine, at the [facility] AT [facility]. Please see a copy of my visit note below.    Sharri Elaine is a 70 year old female who is being evaluated via a billable video visit.      The patient has been notified of following:     \"This video visit will be conducted via a call between you and your physician/provider. We have found that certain health care needs can be provided without the need for an in-person physical exam.  This service lets us provide the care you need with a video conversation.  If a prescription is necessary we can send it directly to your pharmacy.  If lab work is needed we can place an order for that and you can then stop by our lab to have the test done at a later time.    Video visits are billed at different rates depending on your insurance coverage.  Please reach out to your insurance provider with any questions.    If during the course of the call the physician/provider feels a video visit is not appropriate, you will not be charged for this service.\"    Patient has given verbal consent for Video visit? Yes  How would you like to obtain your AVS? MyChart  Will anyone else be joining your video visit? No        Video-Visit Details    Type of service:  Video Visit    Video Start Time: 1.12PM  Video End Time: 1.23PM.    Originating Location (pt. Location): Home    Distant Location (provider location):  [facility]     Platform used for Video Visit: Trampoline       SUBJECTIVE:  Sharri Elaine is a 70 year old female who presents for yearly follow-up.  Patient had proximal LAD stent at [facility] in 4/2010.  Had in-stent restenosis and had repeat stenting in 10/20/2010.  " Since then patient had intermittent chest pain.  In 2013 patient had a exercise stress echo.  This was reported as showing inferior wall ischemia.  Subsequently patient had a coronary angiogram which showed no flow-limiting lesions.  30 to 40% stenosis involving all 3 vessels.  There was 30% in-stent restenosis.  Recently patient had another stress echo this was normal.  Currently patient is very active and has no cardiac symptoms.  Patient Active Problem List    Diagnosis Date Noted     Health Care Home 06/28/2011     Priority: High     DX V65.8 REPLACED WITH 42107 HEALTH CARE HOME (04/08/2013)       Chest pain 05/12/2016     Priority: Medium     Acute chest pain 05/12/2016     Priority: Medium     Advanced directives, counseling/discussion 05/04/2012     Priority: Medium       Advance Directive received and scanned. Click on Code in the patient header to view. 5/4/2012

## 2020-09-10 NOTE — PATIENT INSTRUCTIONS
Thank you for coming to the [facility] @ [place]; please note the following instructions:    1. Your cardiac medications have been refilled and sent to your preferred pharmacy    2.  Follow up in 1 year.  We will contact you when the time gets closer      If you have any questions regarding your visit please contact your care team:     Cardiology  Telephone Number   Tamiko CARABALLO, RN  Marni QUIGLEY, RN   Loraine GRAY, RMA  Carla CANO, RMA  Dane SETH, LPN   [phone] (option 1)   For scheduling appts:     [phone] (select option 1)       For the Device Clinic (Pacemakers and ICD's)  RN's :  Nae Hodge   During business hours: [phone]    *After business hours:  [phone] (select option 4)      Normal test result notifications will be released via [employer] or mailed within 7 business days.  All other test results, will be communicated via telephone once reviewed by your cardiologist.    If you need a medication refill please contact your pharmacy.  Please allow 3 business days for your refill to be completed.    As always, thank you for trusting us with your health care needs!

## 2020-09-10 NOTE — PROGRESS NOTES
"Sharri Elaine is a 70 year old female who is being evaluated via a billable video visit.      The patient has been notified of following:     \"This video visit will be conducted via a call between you and your physician/provider. We have found that certain health care needs can be provided without the need for an in-person physical exam.  This service lets us provide the care you need with a video conversation.  If a prescription is necessary we can send it directly to your pharmacy.  If lab work is needed we can place an order for that and you can then stop by our lab to have the test done at a later time.    Video visits are billed at different rates depending on your insurance coverage.  Please reach out to your insurance provider with any questions.    If during the course of the call the physician/provider feels a video visit is not appropriate, you will not be charged for this service.\"    Patient has given verbal consent for Video visit? Yes  How would you like to obtain your AVS? MyChart  Will anyone else be joining your video visit? No        Video-Visit Details    Type of service:  Video Visit    Video Start Time: 1.12PM  Video End Time: 1.23PM.    Originating Location (pt. Location): Home    Distant Location (provider location):  [facility]     Platform used for Video Visit: [place]       SUBJECTIVE:  Sharri Elaine is a 70 year old female who presents for yearly follow-up.  Patient had proximal LAD stent at [facility] in 4/2010.  Had in-stent restenosis and had repeat stenting in 10/20/2010.  Since then patient had intermittent chest pain.  In 2013 patient had a exercise stress echo.  This was reported as showing inferior wall ischemia.  Subsequently patient had a coronary angiogram which showed no flow-limiting lesions.  30 to 40% stenosis involving all 3 vessels.  There was 30% in-stent restenosis.  Recently patient had another stress echo this " was normal.  Currently patient is very active and has no cardiac symptoms.  Patient Active Problem List    Diagnosis Date Noted     Health Care Home 06/28/2011     Priority: High     DX V65.8 REPLACED WITH 60069 HEALTH CARE HOME (04/08/2013)       Chest pain 05/12/2016     Priority: Medium     Acute chest pain 05/12/2016     Priority: Medium     Advanced directives, counseling/discussion 05/04/2012     Priority: Medium     Advance Directive received and scanned. Click on Code in the patient header to view. 5/4/2012          Rosacea 08/20/2010     Priority: Medium     Hypertension goal BP (blood pressure) < 140/90 07/14/2010     Priority: Medium     Goal <130/80       Hyperlipidemia with target LDL less than 100 07/14/2010     Priority: Medium     January 4, 2011 - . I have asked this patient to obtain a fasting lipid profile tomorrow in Ghada Bermeo's office. She is complaining of some muscle weakness and/or achiness, especially in her legs. Perhaps simvastatin at 40 mg will need to be changed to something more potent such as Crestor. If she is having myalgias or myopathy, a change to a water soluble statin would be in order.   Diagnosis updated by automated process. Provider to review and confirm.       CAD (coronary artery disease)      Priority: Medium     Status post LAD stenting 06/28 for unstable angina. She had in-stent restenosis in the LAD with further stenting on 11/03/2010 when she developed recurrent angina and was hospitalized at [facility]. Coronary angiography demonstrated a 60% circumflex ostial stenosis, a 95% proximal LAD in-stent restenosis along with a 50% ostial stenosis that was unchanged. The right coronary artery had a mild 10%-20% luminal narrowing but nothing significant. Her ejection fraction at that time was 45%. The in-stent restenosis was restented with an [place] drug-eluting stent.   Plavix was changed to Effient and the cardiologist, Dr. Torrez, felt that Effient  should be continued at least 2 years or preferentially longer term. Nuclear stress test late summer 2011. We will consider a stress test earlier than that should the patient have symptoms.    Follow up with Cardiology in 6 months        Preinfarction syndrome (H)      Priority: Medium     (Problem list name updated by automated process. Provider to review and confirm.)      .  Current Outpatient Medications   Medication Sig     aspirin 81 MG tablet Take 81 mg by mouth daily     atorvastatin (LIPITOR) 40 MG tablet Take 1 tablet (40 mg) by mouth daily     carvedilol (COREG) 12.5 MG tablet Take 1 tablet (12.5 mg) by mouth 2 times daily (with meals)     diphenhydrAMINE-acetaminophen (TYLENOL PM)  MG tablet Take 1 tablet by mouth At Bedtime.     esomeprazole (NEXIUM) 20 MG DR capsule Take 20 mg by mouth every morning (before breakfast) Take 30-60 minutes before eating.     losartan (COZAAR) 25 MG tablet Take one pill twice daily     nitroGLYcerin (NITROSTAT) 0.4 MG sublingual tablet Place 1 tablet (0.4 mg) under the tongue every 5 minutes as needed for chest pain     ORDER FOR DME Blood pressure cuff     No current facility-administered medications for this visit.      Past Medical History:   Diagnosis Date     Acne rosacea      Anginas, unstable 2010     CAD (coronary artery disease) 2010    LAD stent x 2     Fibrocystic breast      Hepatitis     age 18     Iritis     14 YEARS OLD     Menopause     AN HRT     MI (myocardial infarction) (H)     non T wave MI     NONSPECIFIC MEDICAL HISTORY 7/14/09    HEART MURMUR- NORMAL ECHO     Shingles 05/2017     Past Surgical History:   Procedure Laterality Date     ANGIOGRAM  6/2010     angiogram  11/3/2010    in stent restenosis     PVD STENTING  2010     x 2, LAD     Allergies   Allergen Reactions     Sulfa Drugs Rash     Social History     Socioeconomic History     Marital status:      Spouse name: Not on file     Number of children: Not on file     Years of  education: Not on file     Highest education level: Not on file   Occupational History     Not on file   Social Needs     Financial resource strain: Not on file     Food insecurity     Worry: Not on file     Inability: Not on file     Transportation needs     Medical: Not on file     Non-medical: Not on file   Tobacco Use     Smoking status: Never Smoker     Smokeless tobacco: Never Used     Tobacco comment: never smoker; non-smoking household   Substance and Sexual Activity     Alcohol use: Yes     Comment: rare     Drug use: No     Comment: never     Sexual activity: Yes     Partners: Male   Lifestyle     Physical activity     Days per week: Not on file     Minutes per session: Not on file     Stress: Not on file   Relationships     Social connections     Talks on phone: Not on file     Gets together: Not on file     Attends Orthodoxy service: Not on file     Active member of club or organization: Not on file     Attends meetings of clubs or organizations: Not on file     Relationship status: Not on file     Intimate partner violence     Fear of current or ex partner: Not on file     Emotionally abused: Not on file     Physically abused: Not on file     Forced sexual activity: Not on file   Other Topics Concern     Parent/sibling w/ CABG, MI or angioplasty before 65F 55M? No   Social History Narrative     Not on file     Family History   Problem Relation Age of Onset     Hypertension Mother      Heart Disease Mother         CHF     Alzheimer Disease Father      Hypertension Daughter      High cholesterol Daughter      Breast Cancer No family hx of           REVIEW OF SYSTEMS:  General: negative, fever, chills, night sweats  Skin: negative, acne, rash and scaling  Eyes: negative, double vision, eye pain and photophobia  Ears/Nose/Throat: negative, nasal congestion and purulent rhinorrhea  Respiratory: No dyspnea on exertion, No cough, No hemoptysis and negative  Cardiovascular: negative, palpitations, tachycardia,  irregular heart beat, chest pain, exertional chest pain or pressure, paroxysmal nocturnal dyspnea, dyspnea on exertion and orthopnea       OBJECTIVE:  not currently breastfeeding.  General Appearance: healthy, alert, active and no distress  Head: Normocephalic. No masses, lesions, tenderness or abnormalities  Eyes: conjuctiva clear, EOM intact       ASSESSMENT/PLAN:  Patient here for yearly follow-up.  Status post proximal LAD stent in April 2010.  Had in-stent restenosis and restenting in October 2010.  Since then patient has intermittent chest pain.  Had a repeat angiogram in 2013.  This showed no flow-limiting lesions.  30% in-stent restenosis..  Normal LV function.  Patient had another stress echo in May 2019.  This was normal.  Currently patient is fairly active with no cardiac symptoms.  Overall she is doing very well.  We will continue current medications and she will keep an yearly follow-up.  Per orders.   Return to Clinic 1 year.

## 2020-09-14 ENCOUNTER — ALLIED HEALTH/NURSE VISIT (OUTPATIENT)
Dept: FAMILY MEDICINE | Facility: CLINIC | Age: 70
End: 2020-09-14
Payer: COMMERCIAL

## 2020-09-14 DIAGNOSIS — Z23 NEED FOR VACCINATION: ICD-10-CM

## 2020-09-14 DIAGNOSIS — Z23 NEED FOR PROPHYLACTIC VACCINATION AND INOCULATION AGAINST INFLUENZA: Primary | ICD-10-CM

## 2020-09-14 PROCEDURE — G0008 ADMIN INFLUENZA VIRUS VAC: HCPCS

## 2020-09-14 PROCEDURE — G0009 ADMIN PNEUMOCOCCAL VACCINE: HCPCS

## 2020-09-14 PROCEDURE — 90662 IIV NO PRSV INCREASED AG IM: CPT

## 2020-09-14 PROCEDURE — 90670 PCV13 VACCINE IM: CPT

## 2020-09-14 PROCEDURE — 90750 HZV VACC RECOMBINANT IM: CPT

## 2020-09-14 PROCEDURE — 99207 ZZC NO CHARGE NURSE ONLY: CPT

## 2020-09-14 PROCEDURE — 90472 IMMUNIZATION ADMIN EACH ADD: CPT

## 2020-09-18 NOTE — PLAN OF CARE
Patient denies any pain since the start of my shift at 0700. She was able to complete a walking stress test this morning (results pending). She is in agreement to gp gp home this afternoon and spouse is here to take her home. Nursing concern resolved on Discharge.   No

## 2021-04-17 ENCOUNTER — HEALTH MAINTENANCE LETTER (OUTPATIENT)
Age: 71
End: 2021-04-17

## 2021-08-24 DIAGNOSIS — E78.5 HYPERLIPIDEMIA WITH TARGET LDL LESS THAN 100: ICD-10-CM

## 2021-08-24 DIAGNOSIS — I10 HYPERTENSION GOAL BP (BLOOD PRESSURE) < 140/90: ICD-10-CM

## 2021-08-25 RX ORDER — ATORVASTATIN CALCIUM 40 MG/1
TABLET, FILM COATED ORAL
Qty: 90 TABLET | Refills: 0 | Status: SHIPPED | OUTPATIENT
Start: 2021-08-25 | End: 2021-10-26

## 2021-08-25 RX ORDER — CARVEDILOL 12.5 MG/1
TABLET ORAL
Qty: 180 TABLET | Refills: 0 | Status: SHIPPED | OUTPATIENT
Start: 2021-08-25 | End: 2021-10-26

## 2021-08-25 NOTE — CONFIDENTIAL NOTE
Point of Care Ultrasound FAST Signed Prescriptions:                        Disp   Refills    atorvastatin (LIPITOR) 40 MG tablet        90 tab*0        Sig: TAKE 1 TABLET DAILY  Authorizing Provider: LUCA MARROQUIN  Ordering User: TAMIKO VILLALOBOS    carvedilol (COREG) 12.5 MG tablet          180 ta*0        Sig: TAKE 1 TABLET TWICE A DAY WITH MEALS  Authorizing Provider: LUCA MARROQUIN  Ordering User: TAMIKO VILLALOBOS    Rx filled pre refill protocol.    Tamiko Villalobos RN  Cardiology Care Coordinator  [facility]  [phone] option 1

## 2021-09-26 ENCOUNTER — HEALTH MAINTENANCE LETTER (OUTPATIENT)
Age: 71
End: 2021-09-26

## 2021-10-07 ENCOUNTER — OFFICE VISIT (OUTPATIENT)
Dept: CARDIOLOGY | Facility: CLINIC | Age: 71
End: 2021-10-07
Payer: COMMERCIAL

## 2021-10-07 VITALS
WEIGHT: 209 LBS | SYSTOLIC BLOOD PRESSURE: 172 MMHG | OXYGEN SATURATION: 98 % | HEART RATE: 70 BPM | BODY MASS INDEX: 35.87 KG/M2 | DIASTOLIC BLOOD PRESSURE: 92 MMHG

## 2021-10-07 DIAGNOSIS — R07.89 CHEST TIGHTNESS: Primary | ICD-10-CM

## 2021-10-07 DIAGNOSIS — Z98.61 STATUS POST PERCUTANEOUS TRANSLUMINAL CORONARY ANGIOPLASTY: ICD-10-CM

## 2021-10-07 DIAGNOSIS — Z23 NEED FOR PROPHYLACTIC VACCINATION AND INOCULATION AGAINST INFLUENZA: ICD-10-CM

## 2021-10-07 PROCEDURE — G0008 ADMIN INFLUENZA VIRUS VAC: HCPCS | Performed by: INTERNAL MEDICINE

## 2021-10-07 PROCEDURE — 99214 OFFICE O/P EST MOD 30 MIN: CPT | Mod: 25 | Performed by: INTERNAL MEDICINE

## 2021-10-07 PROCEDURE — 90662 IIV NO PRSV INCREASED AG IM: CPT | Performed by: INTERNAL MEDICINE

## 2021-10-07 RX ORDER — NITROGLYCERIN 0.4 MG/1
0.4 TABLET SUBLINGUAL EVERY 5 MIN PRN
Qty: 25 TABLET | Refills: 1 | Status: SHIPPED | OUTPATIENT
Start: 2021-10-07 | End: 2023-01-23

## 2021-10-07 NOTE — PATIENT INSTRUCTIONS
Thank you for coming to the [facility] Heart @ [place] [facility]; please note the following instructions:    1. lexiscan to rule out coronary artery blockage    2.  Nitroglycerin tablets have been filled and sent to your preferred pharmacy          If you have any questions regarding your visit please contact your care team:     Cardiology  Telephone Number   Tamiko CARABALLO, RN  Marni QUIGLEY, RN   Loraine GRAY, RMA  Carla CANO, RMA  Dane SETH, LPN   [phone] (option 1)   For scheduling appts:     [phone] (select option 1)       For the Device Clinic (Pacemakers and ICD's)  RN's :  Nae Hodge   During business hours: [phone]    *After business hours:  [phone] (select option 4)      Normal test result notifications will be released via [employer] (T/A) or mailed within 7 business days.  All other test results, will be communicated via telephone once reviewed by your cardiologist.    If you need a medication refill please contact your pharmacy.  Please allow 3 business days for your refill to be completed.    As always, thank you for trusting us with your health care needs!

## 2021-10-07 NOTE — NURSING NOTE
"Chief Complaint   Patient presents with     RECHECK     annual follow up. Pt reports some chest tightness, dizziness.        Initial BP (!) 179/94 (BP Location: Right arm, Patient Position: Chair, Cuff Size: Adult Large)   Pulse 70   Wt 94.8 kg (209 lb)   SpO2 98%   BMI 35.87 kg/m   Estimated body mass index is 35.87 kg/m  as calculated from the following:    Height as of 5/29/19: 1.626 m (5' 4\").    Weight as of this encounter: 94.8 kg (209 lb)..  BP completed using cuff size: izabel Carter MA  "

## 2021-10-07 NOTE — LETTER
10/7/2021      RE: Sharri Elaine  [id number] [id number]       Dear Colleague,    Thank you for the opportunity to participate in the care of your patient, Sharri Elaine, at the [facility]. Please see a copy of my visit note below.       SUBJECTIVE:  Sharri Elaine is a 71 year old female who presents for yearly follow-up.  Coronary artery disease.    Patient had proximal LAD stent at [facility] in 4/2010.  Had in-stent restenosis and had repeat stenting in 10/20/2010.  Since then patient had intermittent chest pain.  In 2013 patient had a exercise stress echo.  This was reported as showing inferior wall ischemia.  Subsequently patient had a coronary angiogram which showed no flow-limiting lesions.  30 to 40% stenosis involving all 3 vessels.  There was 30% in-stent restenosis.   Patient's cardiac risk factors are hypertension and hyperlipidemia.  No diabetes.  Due to chest pain patient had an admission and an exercise stress echo in 2019 which was normal.  Now patient is complaining of intermittent chest tightness.  This is nonexertional but she is very concerned.  No associated symptoms.  Also get an achy feeling in both arms for which he take a 325 mg aspirin.  As per patient she had exactly similar symptoms prior to the stenting.  No other new symptoms.  Patient Active Problem List    Diagnosis Date Noted     Health Care Home 06/28/2011     Priority: High     DX V65.8 REPLACED WITH 56415 HEALTH CARE HOME (04/08/2013)       Chest pain 05/12/2016     Priority: Medium     Acute chest pain 05/12/2016     Priority: Medium     Advanced directives, counseling/discussion 05/04/2012     Priority: Medium       Advance Directive received and scanned. Click on Code in the patient header to view. 5/4/2012

## 2021-10-07 NOTE — PROGRESS NOTES
SUBJECTIVE:  Sharri Elaine is a 71 year old female who presents for yearly follow-up.  Coronary artery disease.    Patient had proximal LAD stent at [facility] in 4/2010.  Had in-stent restenosis and had repeat stenting in 10/20/2010.  Since then patient had intermittent chest pain.  In 2013 patient had a exercise stress echo.  This was reported as showing inferior wall ischemia.  Subsequently patient had a coronary angiogram which showed no flow-limiting lesions.  30 to 40% stenosis involving all 3 vessels.  There was 30% in-stent restenosis.   Patient's cardiac risk factors are hypertension and hyperlipidemia.  No diabetes.  Due to chest pain patient had an admission and an exercise stress echo in 2019 which was normal.  Now patient is complaining of intermittent chest tightness.  This is nonexertional but she is very concerned.  No associated symptoms.  Also get an achy feeling in both arms for which he take a 325 mg aspirin.  As per patient she had exactly similar symptoms prior to the stenting.  No other new symptoms.  Patient Active Problem List    Diagnosis Date Noted     Health Care Home 06/28/2011     Priority: High     DX V65.8 REPLACED WITH 34468 HEALTH CARE HOME (04/08/2013)       Chest pain 05/12/2016     Priority: Medium     Acute chest pain 05/12/2016     Priority: Medium     Advanced directives, counseling/discussion 05/04/2012     Priority: Medium     Advance Directive received and scanned. Click on Code in the patient header to view. 5/4/2012          Rosacea 08/20/2010     Priority: Medium     Hypertension goal BP (blood pressure) < 140/90 07/14/2010     Priority: Medium     Goal <130/80       Hyperlipidemia with target LDL less than 100 07/14/2010     Priority: Medium     January 4, 2011 - . I have asked this patient to obtain a fasting lipid profile tomorrow in Ghada Bermeo's office. She is complaining of some muscle weakness and/or achiness, especially in her legs. Perhaps  simvastatin at 40 mg will need to be changed to something more potent such as Crestor. If she is having myalgias or myopathy, a change to a water soluble statin would be in order.   Diagnosis updated by automated process. Provider to review and confirm.       CAD (coronary artery disease)      Priority: Medium     Status post LAD stenting 06/28 for unstable angina. She had in-stent restenosis in the LAD with further stenting on 11/03/2010 when she developed recurrent angina and was hospitalized at [facility]. Coronary angiography demonstrated a 60% circumflex ostial stenosis, a 95% proximal LAD in-stent restenosis along with a 50% ostial stenosis that was unchanged. The right coronary artery had a mild 10%-20% luminal narrowing but nothing significant. Her ejection fraction at that time was 45%. The in-stent restenosis was restented with an [place] drug-eluting stent.   Plavix was changed to Effient and the cardiologist, Dr. Torrez, felt that Effient should be continued at least 2 years or preferentially longer term. Nuclear stress test late summer 2011. We will consider a stress test earlier than that should the patient have symptoms.    Follow up with Cardiology in 6 months        Preinfarction syndrome (H)      Priority: Medium     (Problem list name updated by automated process. Provider to review and confirm.)      .  Current Outpatient Medications   Medication Sig     aspirin (ASA) 325 MG EC tablet Take 325 mg by mouth once     aspirin 81 MG tablet Take 81 mg by mouth daily     atorvastatin (LIPITOR) 40 MG tablet TAKE 1 TABLET DAILY     carvedilol (COREG) 12.5 MG tablet TAKE 1 TABLET TWICE A DAY WITH MEALS     diphenhydrAMINE-acetaminophen (TYLENOL PM)  MG tablet Take 1 tablet by mouth At Bedtime.     esomeprazole (NEXIUM) 20 MG DR capsule Take 20 mg by mouth every morning (before breakfast) Take 30-60 minutes before eating.     losartan (COZAAR) 25 MG tablet Take one pill twice daily      nitroGLYcerin (NITROSTAT) 0.4 MG sublingual tablet Place 1 tablet (0.4 mg) under the tongue every 5 minutes as needed for chest pain     ORDER FOR DME Blood pressure cuff     No current facility-administered medications for this visit.     Past Medical History:   Diagnosis Date     Acne rosacea      Anginas, unstable 2010     CAD (coronary artery disease) 2010    LAD stent x 2     Fibrocystic breast      Hepatitis     age 18     Iritis     14 YEARS OLD     Menopause     AN HRT     MI (myocardial infarction) (H)     non T wave MI     NONSPECIFIC MEDICAL HISTORY 7/14/09    HEART MURMUR- NORMAL ECHO     Shingles 05/2017     Past Surgical History:   Procedure Laterality Date     ANGIOGRAM  6/2010     angiogram  11/3/2010    in stent restenosis     PVD STENTING  2010     x 2, LAD     Allergies   Allergen Reactions     Sulfa Drugs Rash     Social History     Socioeconomic History     Marital status:      Spouse name: Not on file     Number of children: Not on file     Years of education: Not on file     Highest education level: Not on file   Occupational History     Not on file   Tobacco Use     Smoking status: Never Smoker     Smokeless tobacco: Never Used     Tobacco comment: never smoker; non-smoking household   Substance and Sexual Activity     Alcohol use: Yes     Comment: rare     Drug use: No     Comment: never     Sexual activity: Yes     Partners: Male   Other Topics Concern     Parent/sibling w/ CABG, MI or angioplasty before 65F 55M? No   Social History Narrative     Not on file     Social Determinants of Health     Financial Resource Strain:      Difficulty of Paying Living Expenses:    Food Insecurity:      Worried About Running Out of Food in the Last Year:      Ran Out of Food in the Last Year:    Transportation Needs:      Lack of Transportation (Medical):      Lack of Transportation (Non-Medical):    Physical Activity:      Days of Exercise per Week:      Minutes of Exercise per Session:     Stress:      Feeling of Stress :    Social Connections:      Frequency of Communication with Friends and Family:      Frequency of Social Gatherings with Friends and Family:      Attends Mosque Services:      Active Member of Clubs or Organizations:      Attends Club or Organization Meetings:      Marital Status:    Intimate Partner Violence:      Fear of Current or Ex-Partner:      Emotionally Abused:      Physically Abused:      Sexually Abused:      Family History   Problem Relation Age of Onset     Hypertension Mother      Heart Disease Mother         CHF     Alzheimer Disease Father      Hypertension Daughter      High cholesterol Daughter      Breast Cancer No family hx of           REVIEW OF SYSTEMS:  General: negative, fever, chills, night sweats  Skin: negative, acne, rash and scaling  Eyes: negative, double vision, eye pain and photophobia  Ears/Nose/Throat: negative, nasal congestion and purulent rhinorrhea  Respiratory: No dyspnea on exertion, No cough, No hemoptysis and negative  Cardiovascular: negative, palpitations, tachycardia, irregular heart beat, paroxysmal nocturnal dyspnea, dyspnea on exertion and lower extremity edema         OBJECTIVE:  Blood pressure (!) 172/92, pulse 70, weight 94.8 kg (209 lb), SpO2 98 %, not currently breastfeeding.  General Appearance: alert and no distress  Head: Normocephalic. No masses, lesions, tenderness or abnormalities  Eyes: conjuctiva clear, PERRL, EOM intact  Ears: External ears normal. Canals clear. TM's normal.  Nose: Nares normal  Mouth: normal  Neck: Supple, no cervical adenopathy, no thyromegaly  Lungs: clear to auscultation  Cardiac: regular rate and rhythm, normal S1 and S2, no murmur         ASSESSMENT/PLAN:  Patient here for yearly follow-up.  Coronary artery disease.  Status post LAD stent in April 2010 at [facility].  Had in-stent restenosis and had repeat stenting in the same area in October 2010.  Since then patient remained asymptomatic.   In 2013 patient had an abnormal stress echo and had repeat angiogram showing 30 to 40% three-vessel disease and 30% in-stent restenosis.  Patient do have this intermittent chest tightness.  She was admitted with chest pain in 2019 and had a normal exercise stress echo.  Now patient is complaining that she has intermittent chest tightness and achy sensation in the arms.  As per patient this was the same symptoms she had prior to the stenting.  She is concerned.  Because of recurrent symptoms we will plan for a repeat stress test.  She is on Coreg 12.5 mg twice a day.  Today at the clinic blood pressure was slightly high.  It may not be ideal to stop the beta-blocker and do a stress test.  Because of this we will plan for a Lexiscan and this will be arranged as soon as possible.  Patient will be contacted with the test result.  She is advised to continue her current medication.  Per orders.   Return to Clinic 1 year.  Total visit duration 20 minutes.  This includes face-to-face interview, physical exam, chart review, review of stress echo and documentation.

## 2021-10-25 ENCOUNTER — TELEPHONE (OUTPATIENT)
Dept: CARDIOLOGY | Facility: CLINIC | Age: 71
End: 2021-10-25

## 2021-10-25 DIAGNOSIS — I10 HYPERTENSION GOAL BP (BLOOD PRESSURE) < 140/90: ICD-10-CM

## 2021-10-25 DIAGNOSIS — E78.5 HYPERLIPIDEMIA WITH TARGET LDL LESS THAN 100: ICD-10-CM

## 2021-10-25 NOTE — TELEPHONE ENCOUNTER
[facility] Call Center    Phone Message    May a detailed message be left on voicemail: no     Reason for Call: Medication Question or concern regarding medication   Prescription Clarification  Name of Medication: losartan (COZAAR) 25 MG tablet, carvedilol (COREG) 12.5 MG tablet and atorvastatin (LIPITOR) 40 MG tablet  Prescribing Provider: Verna SPRINGER MD    Pharmacy:   [facility] - [address]   What on the order needs clarification? Refill request. Low on Rx Losartan - 1 week left           Action Taken: Other: [facility]    Travel Screening: Not Applicable

## 2021-10-26 RX ORDER — ATORVASTATIN CALCIUM 40 MG/1
40 TABLET, FILM COATED ORAL DAILY
Qty: 90 TABLET | Refills: 3 | Status: SHIPPED | OUTPATIENT
Start: 2021-10-26 | End: 2022-10-13

## 2021-10-26 RX ORDER — LOSARTAN POTASSIUM 25 MG/1
25 TABLET ORAL 2 TIMES DAILY
Qty: 180 TABLET | Refills: 3 | Status: SHIPPED | OUTPATIENT
Start: 2021-10-26 | End: 2022-10-13

## 2021-10-26 RX ORDER — CARVEDILOL 12.5 MG/1
12.5 TABLET ORAL 2 TIMES DAILY WITH MEALS
Qty: 180 TABLET | Refills: 3 | Status: SHIPPED | OUTPATIENT
Start: 2021-10-26 | End: 2022-10-13

## 2021-10-26 NOTE — TELEPHONE ENCOUNTER
Signed Prescriptions:                        Disp   Refills    atorvastatin (LIPITOR) 40 MG tablet        90 tab*3        Sig: Take 1 tablet (40 mg) by mouth daily  Authorizing Provider: LUCA MARROQUIN  Ordering User: TAMIKO VILLALOBOS    carvedilol (COREG) 12.5 MG tablet          180 ta*3        Sig: Take 1 tablet (12.5 mg) by mouth 2 times daily (with           meals)  Authorizing Provider: LUCA MARROQUIN  Ordering User: TAMIKO VILLALOBOS    losartan (COZAAR) 25 MG tablet             180 ta*3        Sig: Take 1 tablet (25 mg) by mouth 2 times daily  Authorizing Provider: LUCA MARROQUIN  Ordering User: TAMIKO VILLALOBOS    Rx filled and message left for patient that Rx has been sent to preferred pharmacy and to call with concerns.    Tamiko Villalobos, RN  Cardiology Care Coordinator  [facility]  [phone] option 1

## 2021-11-17 ENCOUNTER — ANCILLARY PROCEDURE (OUTPATIENT)
Dept: NUCLEAR MEDICINE | Facility: CLINIC | Age: 71
End: 2021-11-17
Attending: INTERNAL MEDICINE
Payer: COMMERCIAL

## 2021-11-17 DIAGNOSIS — R07.89 CHEST TIGHTNESS: ICD-10-CM

## 2021-11-17 DIAGNOSIS — Z98.61 STATUS POST PERCUTANEOUS TRANSLUMINAL CORONARY ANGIOPLASTY: ICD-10-CM

## 2021-11-17 DIAGNOSIS — I10 HYPERTENSION GOAL BP (BLOOD PRESSURE) < 140/90: Primary | ICD-10-CM

## 2021-11-17 LAB
CV STRESS MAX HR HE: 90
RATE PRESSURE PRODUCT: NORMAL
STRESS ECHO BASELINE DIASTOLIC HE: 80
STRESS ECHO BASELINE HR: 75 BPM
STRESS ECHO BASELINE SYSTOLIC BP: 196
STRESS ECHO CALCULATED PERCENT HR: 61 %
STRESS ECHO LAST STRESS DIASTOLIC BP: 86
STRESS ECHO LAST STRESS SYSTOLIC BP: 182
STRESS ECHO TARGET HR: 147

## 2021-11-17 PROCEDURE — 78452 HT MUSCLE IMAGE SPECT MULT: CPT | Performed by: INTERNAL MEDICINE

## 2021-11-17 PROCEDURE — 93018 CV STRESS TEST I&R ONLY: CPT | Performed by: INTERNAL MEDICINE

## 2021-11-17 PROCEDURE — A9502 TC99M TETROFOSMIN: HCPCS | Performed by: INTERNAL MEDICINE

## 2021-11-17 PROCEDURE — 93017 CV STRESS TEST TRACING ONLY: CPT | Performed by: INTERNAL MEDICINE

## 2021-11-17 PROCEDURE — 93016 CV STRESS TEST SUPVJ ONLY: CPT | Performed by: INTERNAL MEDICINE

## 2021-11-17 RX ORDER — REGADENOSON 0.08 MG/ML
0.4 INJECTION, SOLUTION INTRAVENOUS ONCE
Status: COMPLETED | OUTPATIENT
Start: 2021-11-17 | End: 2021-11-17

## 2021-11-17 RX ADMIN — REGADENOSON 0.4 MG: 0.08 INJECTION, SOLUTION INTRAVENOUS at 14:09

## 2022-05-08 ENCOUNTER — HEALTH MAINTENANCE LETTER (OUTPATIENT)
Age: 72
End: 2022-05-08

## 2022-10-28 ENCOUNTER — LAB (OUTPATIENT)
Dept: LAB | Facility: CLINIC | Age: 72
End: 2022-10-28
Payer: COMMERCIAL

## 2022-10-28 DIAGNOSIS — I10 HYPERTENSION GOAL BP (BLOOD PRESSURE) < 140/90: ICD-10-CM

## 2022-10-28 DIAGNOSIS — E78.5 HYPERLIPIDEMIA WITH TARGET LDL LESS THAN 100: ICD-10-CM

## 2022-10-28 DIAGNOSIS — I25.10 CAD (CORONARY ARTERY DISEASE): ICD-10-CM

## 2022-10-28 LAB
ALBUMIN SERPL BCG-MCNC: 4 G/DL (ref 3.5–5.2)
ALP SERPL-CCNC: 218 U/L (ref 35–104)
ALT SERPL W P-5'-P-CCNC: 21 U/L (ref 10–35)
ANION GAP SERPL CALCULATED.3IONS-SCNC: 8 MMOL/L (ref 7–15)
AST SERPL W P-5'-P-CCNC: 21 U/L (ref 10–35)
BILIRUB SERPL-MCNC: 0.4 MG/DL
BUN SERPL-MCNC: 16.7 MG/DL (ref 8–23)
CALCIUM SERPL-MCNC: 9.4 MG/DL (ref 8.8–10.2)
CHLORIDE SERPL-SCNC: 106 MMOL/L (ref 98–107)
CHOLEST SERPL-MCNC: 175 MG/DL
CREAT SERPL-MCNC: 0.85 MG/DL (ref 0.51–0.95)
DEPRECATED HCO3 PLAS-SCNC: 28 MMOL/L (ref 22–29)
GFR SERPL CREATININE-BSD FRML MDRD: 71 ML/MIN/1.73M2
GLUCOSE SERPL-MCNC: 105 MG/DL (ref 70–99)
HDLC SERPL-MCNC: 61 MG/DL
LDLC SERPL CALC-MCNC: 95 MG/DL
NONHDLC SERPL-MCNC: 114 MG/DL
POTASSIUM SERPL-SCNC: 4.6 MMOL/L (ref 3.4–5.3)
PROT SERPL-MCNC: 7.1 G/DL (ref 6.4–8.3)
SODIUM SERPL-SCNC: 142 MMOL/L (ref 136–145)
TRIGL SERPL-MCNC: 96 MG/DL

## 2022-10-28 PROCEDURE — 80061 LIPID PANEL: CPT

## 2022-10-28 PROCEDURE — 80053 COMPREHEN METABOLIC PANEL: CPT

## 2022-10-28 PROCEDURE — 36415 COLL VENOUS BLD VENIPUNCTURE: CPT

## 2022-11-03 ENCOUNTER — OFFICE VISIT (OUTPATIENT)
Dept: CARDIOLOGY | Facility: CLINIC | Age: 72
End: 2022-11-03
Attending: INTERNAL MEDICINE
Payer: COMMERCIAL

## 2022-11-03 VITALS
SYSTOLIC BLOOD PRESSURE: 158 MMHG | DIASTOLIC BLOOD PRESSURE: 85 MMHG | WEIGHT: 212 LBS | OXYGEN SATURATION: 98 % | HEIGHT: 64 IN | HEART RATE: 67 BPM | BODY MASS INDEX: 36.19 KG/M2

## 2022-11-03 DIAGNOSIS — I10 HYPERTENSION GOAL BP (BLOOD PRESSURE) < 140/90: ICD-10-CM

## 2022-11-03 DIAGNOSIS — Z98.61 STATUS POST PERCUTANEOUS TRANSLUMINAL CORONARY ANGIOPLASTY: ICD-10-CM

## 2022-11-03 DIAGNOSIS — E78.5 HYPERLIPIDEMIA WITH TARGET LDL LESS THAN 100: ICD-10-CM

## 2022-11-03 PROCEDURE — 99214 OFFICE O/P EST MOD 30 MIN: CPT | Performed by: INTERNAL MEDICINE

## 2022-11-03 RX ORDER — LOSARTAN POTASSIUM 25 MG/1
25 TABLET ORAL 2 TIMES DAILY
Qty: 180 TABLET | Refills: 3 | Status: SHIPPED | OUTPATIENT
Start: 2022-11-03 | End: 2022-11-16

## 2022-11-03 RX ORDER — CARVEDILOL 25 MG/1
25 TABLET ORAL 2 TIMES DAILY WITH MEALS
Qty: 180 TABLET | Refills: 3 | Status: SHIPPED | OUTPATIENT
Start: 2022-11-03 | End: 2022-11-16

## 2022-11-03 RX ORDER — ATORVASTATIN CALCIUM 40 MG/1
40 TABLET, FILM COATED ORAL DAILY
Qty: 90 TABLET | Refills: 3 | Status: SHIPPED | OUTPATIENT
Start: 2022-11-03 | End: 2023-12-31

## 2022-11-03 NOTE — PATIENT INSTRUCTIONS
Thank you for coming to the [facility]; please note the following instructions:     Increase carvedilol to 25 mg twice daily  Continue Losartan at 25 mg twice daily  Dr. Torres recommends to follow up in 1 year.  The cardiology team will contact you to schedule when the time gets closer.  Follow-up with primary care to review abnormal lab    If you have any questions regarding your visit please contact your care team:     Cardiology  Telephone Number   Tamiko LORENZO., RN  Marni QUIGLEY, RN   Loraine GRAY, RMCESAR CANO, SUZIE STYLES, Visit Facilitator   [phone] (option 1)   For scheduling appts:     [phone] (select option 1)       For the Device Clinic (Pacemakers and ICD's)  RN's :  Nae Hodge   During business hours: [phone]    *After business hours:  [phone] (select option 4)      Normal test result notifications will be released via [employer] or mailed within 7 business days.  All other test results, will be communicated via telephone once reviewed by your cardiologist.    If you need a medication refill please contact your pharmacy.  Please allow 3 business days for your refill to be completed.    As always, thank you for trusting us with your health care needs!

## 2022-11-03 NOTE — PROGRESS NOTES
SUBJECTIVE:  Sharri Elaine is a 72 year old female who presents for follow-up.  Coronary artery disease.    Patient had proximal LAD stent at [facility] in 4/2010.  Had in-stent restenosis and had repeat stenting in 11/3/2010.  Since then patient had intermittent chest pain.  In 2013 patient had a exercise stress echo.  This was reported as showing inferior wall ischemia.  Subsequently patient had a coronary angiogram which showed no flow-limiting lesions.  30 to 40% stenosis involving all 3 vessels.  There was 30% in-stent restenosis.   Patient's cardiac risk factors are hypertension and hyperlipidemia.  No diabetes.  Due to chest pain patient had an admission and an exercise stress echo in 2019 which was normal.  Now patient is complaining of intermittent chest tightness.  This is nonexertional but she is very concerned.  No associated symptoms.  Also get an achy feeling in both arms for which he take a 325 mg aspirin.  As per patient she had exactly similar symptoms prior to the stenting.  Because of this patient was advised to have a Lexiscan.  Here to discuss the results and further plan.  Today had no cardiac complaints.  Patient Active Problem List    Diagnosis Date Noted     Health Care Home 06/28/2011     Priority: High     DX V65.8 REPLACED WITH 04171 HEALTH CARE HOME (04/08/2013)       Chest pain 05/12/2016     Priority: Medium     Acute chest pain 05/12/2016     Priority: Medium     Advanced directives, counseling/discussion 05/04/2012     Priority: Medium     Advance Directive received and scanned. Click on Code in the patient header to view. 5/4/2012          Rosacea 08/20/2010     Priority: Medium     Hypertension goal BP (blood pressure) < 140/90 07/14/2010     Priority: Medium     Goal <130/80       Hyperlipidemia with target LDL less than 100 07/14/2010     Priority: Medium     January 4, 2011 - . I have asked this patient to obtain a fasting lipid profile tomorrow in Ghada Bermeo's  office. She is complaining of some muscle weakness and/or achiness, especially in her legs. Perhaps simvastatin at 40 mg will need to be changed to something more potent such as Crestor. If she is having myalgias or myopathy, a change to a water soluble statin would be in order.   Diagnosis updated by automated process. Provider to review and confirm.       CAD (coronary artery disease)      Priority: Medium     Status post LAD stenting 06/28 for unstable angina. She had in-stent restenosis in the LAD with further stenting on 11/03/2010 when she developed recurrent angina and was hospitalized at [facility]. Coronary angiography demonstrated a 60% circumflex ostial stenosis, a 95% proximal LAD in-stent restenosis along with a 50% ostial stenosis that was unchanged. The right coronary artery had a mild 10%-20% luminal narrowing but nothing significant. Her ejection fraction at that time was 45%. The in-stent restenosis was restented with an [place] drug-eluting stent.   Plavix was changed to Effient and the cardiologist, Dr. Torrez, felt that Effient should be continued at least 2 years or preferentially longer term. Nuclear stress test late summer 2011. We will consider a stress test earlier than that should the patient have symptoms.    Follow up with Cardiology in 6 months        Preinfarction syndrome (H)      Priority: Medium     (Problem list name updated by automated process. Provider to review and confirm.)      .  Current Outpatient Medications   Medication Sig     aspirin (ASA) 325 MG EC tablet Take 325 mg by mouth once     atorvastatin (LIPITOR) 40 MG tablet Take 1 tablet (40 mg) by mouth daily Lab and follow up needed for further refills     carvedilol (COREG) 12.5 MG tablet Take 1 tablet (12.5 mg) by mouth 2 times daily (with meals) Lab and follow up needed for further refills     diphenhydrAMINE-acetaminophen (TYLENOL PM)  MG tablet Take 1 tablet by mouth At Bedtime.      esomeprazole (NEXIUM) 20 MG DR capsule Take 20 mg by mouth every morning (before breakfast) Take 30-60 minutes before eating.     losartan (COZAAR) 25 MG tablet Take 1 tablet (25 mg) by mouth 2 times daily Lab and follow up needed for further refills     nitroGLYcerin (NITROSTAT) 0.4 MG sublingual tablet Place 1 tablet (0.4 mg) under the tongue every 5 minutes as needed for chest pain     ORDER FOR DME Blood pressure cuff     No current facility-administered medications for this visit.     Past Medical History:   Diagnosis Date     Acne rosacea      Anginas, unstable 2010     CAD (coronary artery disease) 2010    LAD stent x 2     Fibrocystic breast      Hepatitis     age 18     Iritis     14 YEARS OLD     Menopause     AN HRT     MI (myocardial infarction) (H)     non T wave MI     NONSPECIFIC MEDICAL HISTORY 7/14/09    HEART MURMUR- NORMAL ECHO     Shingles 05/2017     Past Surgical History:   Procedure Laterality Date     ANGIOGRAM  6/2010     angiogram  11/3/2010    in stent restenosis     PVD STENTING  2010     x 2, LAD     Allergies   Allergen Reactions     Lisinopril Cough     Sulfa Drugs Rash     Social History     Socioeconomic History     Marital status:      Spouse name: Not on file     Number of children: Not on file     Years of education: Not on file     Highest education level: Not on file   Occupational History     Not on file   Tobacco Use     Smoking status: Never     Smokeless tobacco: Never     Tobacco comments:     never smoker; non-smoking household   Substance and Sexual Activity     Alcohol use: Yes     Comment: rare     Drug use: No     Comment: never     Sexual activity: Yes     Partners: Male   Other Topics Concern     Parent/sibling w/ CABG, MI or angioplasty before 65F 55M? No   Social History Narrative     Not on file     Social Determinants of Health     Financial Resource Strain: Not on file   Food Insecurity: Not on file   Transportation Needs: Not on file   Physical Activity:  Not on file   Stress: Not on file   Social Connections: Not on file   Intimate Partner Violence: Not on file   Housing Stability: Not on file     Family History   Problem Relation Age of Onset     Hypertension Mother      Heart Disease Mother         CHF     Alzheimer Disease Father      Hypertension Daughter      High cholesterol Daughter      Breast Cancer No family hx of           REVIEW OF SYSTEMS:  General: negative, fever, chills, night sweats  Skin: negative, acne, rash and scaling  Eyes: negative, double vision, eye pain and photophobia  Ears/Nose/Throat: negative, nasal congestion and purulent rhinorrhea  Respiratory: No dyspnea on exertion, No cough, No hemoptysis and negative  Cardiovascular: negative, palpitations, tachycardia, irregular heart beat, chest pain, exertional chest pain or pressure, paroxysmal nocturnal dyspnea, dyspnea on exertion and orthopnea       OBJECTIVE:  not currently breastfeeding.  General Appearance: alert, active and no distress  Head: Normocephalic. No masses, lesions, tenderness or abnormalities  Eyes: conjuctiva clear, PERRL, EOM intact  Ears: External ears normal. Canals clear. TM's normal.  Nose: Nares normal  Mouth: normal  Neck: Supple, no cervical adenopathy, no thyromegaly  Lungs: clear to auscultation  Cardiac: regular rate and rhythm, normal S1 and S2, no murmur       ASSESSMENT/PLAN:  Patient here for follow-up.  LAD stent in June 2010.  In-stent restenosis and had repeat stenting of LAD in November 2010.  Since then patient had 1 more coronary angiogram showing no flow-limiting lesions.  Recently complained of again chest pain like prior to the stent.  Patient was advised to have a Lexiscan.  This was reviewed with patient.  Completely normal Lexiscan.  No perfusion defect.  Normal LV function.  Result was discussed with patient  Her blood pressure is elevated.  Currently patient is on losartan 25 mg twice a day and Coreg 12.5 mg twice a day.  We will increase the  dose of Coreg to 25 mg twice a day.  Advised to keep a blood pressure record as well as pulse.  Other cardiac medications are aspirin 25 mg daily and atorvastatin 40 mg daily.  Patient is advised to continue these medications.  She will keep an yearly follow-up.  Total visit duration 30 minutes.  This included face-to-face interview, physical exam, chart review, review of Lexiscan and documentation.

## 2022-11-03 NOTE — LETTER
11/3/2022      RE: Sharri Elaine  [address] [facility] [address]       Dear Colleague,    Thank you for the opportunity to participate in the care of your patient, Sharri Elaine, at the [facility]ART [facility]INIC [facility]. Please see a copy of my visit note below.       SUBJECTIVE:  Sharri Elaine is a 72 year old female who presents for follow-up.  Coronary artery disease.    Patient had proximal LAD stent at [facility] in 4/2010.  Had in-stent restenosis and had repeat stenting in 11/3/2010.  Since then patient had intermittent chest pain.  In 2013 patient had a exercise stress echo.  This was reported as showing inferior wall ischemia.  Subsequently patient had a coronary angiogram which showed no flow-limiting lesions.  30 to 40% stenosis involving all 3 vessels.  There was 30% in-stent restenosis.   Patient's cardiac risk factors are hypertension and hyperlipidemia.  No diabetes.  Due to chest pain patient had an admission and an exercise stress echo in 2019 which was normal.  Now patient is complaining of intermittent chest tightness.  This is nonexertional but she is very concerned.  No associated symptoms.  Also get an achy feeling in both arms for which he take a 325 mg aspirin.  As per patient she had exactly similar symptoms prior to the stenting.  Because of this patient was advised to have a Lexiscan.  Here to discuss the results and further plan.  Today had no cardiac complaints.  Patient Active Problem List    Diagnosis Date Noted     Health Care Home 06/28/2011     Priority: High     DX V65.8 REPLACED WITH [id number] HEALTH CARE HOME (04/08/2013)       Chest pain 05/12/2016     Priority: Medium     Acute chest pain 05/12/2016     Priority: Medium     Advanced directives, counseling/discussion 05/04/2012     Priority: Medium       Advance Directive received and scanned. Click on Code in the patient header to view.  5/4/2012

## 2022-11-15 ENCOUNTER — TELEPHONE (OUTPATIENT)
Dept: CARDIOLOGY | Facility: CLINIC | Age: 72
End: 2022-11-15

## 2022-11-15 DIAGNOSIS — I10 HYPERTENSION GOAL BP (BLOOD PRESSURE) < 140/90: ICD-10-CM

## 2022-11-15 DIAGNOSIS — E78.5 HYPERLIPIDEMIA WITH TARGET LDL LESS THAN 100: ICD-10-CM

## 2022-11-15 NOTE — TELEPHONE ENCOUNTER
[facility] Call Center    Phone Message    May a detailed message be left on voicemail: yes     Reason for Call: Other:     Pt is requesting a call back after 1 pm today to discuss medication carvedilol (COREG) 25 MG tablet.    Action Taken: Other: cardio    Travel Screening: Not Applicable     Thank you!  [facility]

## 2022-11-16 RX ORDER — LOSARTAN POTASSIUM 50 MG/1
50 TABLET ORAL 2 TIMES DAILY
Qty: 180 TABLET | Refills: 1 | Status: SHIPPED | OUTPATIENT
Start: 2022-11-16 | End: 2023-05-01

## 2022-11-16 NOTE — TELEPHONE ENCOUNTER
Date: 11/16/2022    Time of Call: 4:01 PM     Diagnosis:  htn     [ VORB ] Ordering provider: Dr. Torres  Order: STOP Coreg  Increase losartan to 50 mg twice daily  BMP in approx. 10 days     Order received by: Tamiko Luna RN       Follow-up/additional notes: patient informed and verbalized understanding.  Patient is seeing primary care near her home [place] and she wants to get labs checked there.  She has an appt at primary care tomorrow and will inform them on the lab order and medication change.  Writer states that if she needs a lab order we can fax it to her preference.  Patient verbalized understanding.    Tamiko Luna RN  Cardiology Care Coordinator  [facility]  [phone] option 1

## 2022-11-16 NOTE — TELEPHONE ENCOUNTER
Spoke to patient who reports that she has been feeling nausea for a long time but when her carvedilol was increased to 25 mg twice daily she really felt nauseated.  She does not want to take carvedilol any more because she feels it is causing her stomach upset.  She stopped it but did take 1 dose yesterday because she was worried about her elevated blood pressure and she felt nauseated after it.  She does take carvedilol with food.  She did increase her losartan to 50 mg yesterday evening.  She wants to increase losartan to 50 mg twice daily from 25 mg twice daily.    Dr. Torres, please advise on BP medication regimen-pt does not want to take carvedilol.      Tamiko Luna RN  Cardiology Care Coordinator  [facility]  [phone] option 1

## 2023-01-20 DIAGNOSIS — R07.89 CHEST TIGHTNESS: ICD-10-CM

## 2023-01-20 DIAGNOSIS — Z98.61 STATUS POST PERCUTANEOUS TRANSLUMINAL CORONARY ANGIOPLASTY: ICD-10-CM

## 2023-01-23 RX ORDER — NITROGLYCERIN 0.4 MG/1
0.4 TABLET SUBLINGUAL EVERY 5 MIN PRN
Qty: 25 TABLET | Refills: 1 | Status: SHIPPED | OUTPATIENT
Start: 2023-01-23

## 2023-04-27 DIAGNOSIS — E78.5 HYPERLIPIDEMIA WITH TARGET LDL LESS THAN 100: ICD-10-CM

## 2023-04-27 DIAGNOSIS — I10 HYPERTENSION GOAL BP (BLOOD PRESSURE) < 140/90: ICD-10-CM

## 2023-05-01 RX ORDER — LOSARTAN POTASSIUM 50 MG/1
50 TABLET ORAL 2 TIMES DAILY
Qty: 180 TABLET | Refills: 1 | Status: SHIPPED | OUTPATIENT
Start: 2023-05-01 | End: 2023-10-23

## 2023-05-01 NOTE — TELEPHONE ENCOUNTER
losartan (COZAAR) 50 MG tablet  Last Written Prescription Date: 11/16/22  Last Fill Quantity: 180,   # refills: 1  Last Office Visit : 11/3/22  Future Office visit:  None    bp > 140/90 htn addressed in note    She will keep an yearly follow-up.

## 2023-06-02 ENCOUNTER — HEALTH MAINTENANCE LETTER (OUTPATIENT)
Age: 73
End: 2023-06-02

## 2023-07-10 ENCOUNTER — TELEPHONE (OUTPATIENT)
Dept: CARDIOLOGY | Facility: CLINIC | Age: 73
End: 2023-07-10
Payer: COMMERCIAL

## 2023-07-10 DIAGNOSIS — I25.10 CAD (CORONARY ARTERY DISEASE): Primary | ICD-10-CM

## 2023-07-10 NOTE — TELEPHONE ENCOUNTER
[facility] Call Center    Phone Message    May a detailed message be left on voicemail: yes     Reason for Call: Medication Question or concern regarding medication   Prescription Clarification  Name of Medication: Losartan  Prescribing Provider: Verna   Pharmacy: [facility] - [place], [place] - [address] [place] [address]     What on the order needs clarification? Pt stated since stopping other BP medication (cannot remember the name of other med.)  Pt BP was off, so she adjusted losartan dosage to two pills in AM and one in PM.  Pt stated this resolved the issue, but she will need a refill sooner- within the next month.   Pt requested a change to medication order so pt does not run out.  Please call pt to discuss/ confirm change.           Action Taken: Message routed to:  [facility] cardio    Travel Screening: Not Applicable

## 2023-07-10 NOTE — TELEPHONE ENCOUNTER
Spoke to patient who reports that she increased losartan starting in December because her blood pressure readings were high since carvedilol was stopped 11/15/22.  She did this on her own.  Felt it was giving her better control.  She now needs a refill of losartan-confirmed she is taking 100 mg in the am and 50 mg in the evening.  aneudy has not been monitoring her BP but does have a monitor at home.  Writer advised patient to sit down for 5 min and check BP.  Writer called patient back and patient reports her reading /79 heart rate 88.  Pt states that she does not feel her BP home monitor is accurate.    Advised patient to get labs drawn and make a nurse visit for BP check.  Pt is scheduled for Wed 7/12.  Writer advised patient to bring in BP monitor.    Tamiko Luna RN  Cardiology Care Coordinator  [facility]  [phone] option 1

## 2023-07-12 ENCOUNTER — ALLIED HEALTH/NURSE VISIT (OUTPATIENT)
Dept: CARDIOLOGY | Facility: CLINIC | Age: 73
End: 2023-07-12
Payer: COMMERCIAL

## 2023-07-12 ENCOUNTER — LAB (OUTPATIENT)
Dept: LAB | Facility: CLINIC | Age: 73
End: 2023-07-12
Payer: COMMERCIAL

## 2023-07-12 VITALS — DIASTOLIC BLOOD PRESSURE: 105 MMHG | SYSTOLIC BLOOD PRESSURE: 176 MMHG | HEART RATE: 91 BPM

## 2023-07-12 DIAGNOSIS — I10 HYPERTENSION GOAL BP (BLOOD PRESSURE) < 140/90: Primary | ICD-10-CM

## 2023-07-12 DIAGNOSIS — I25.10 CAD (CORONARY ARTERY DISEASE): ICD-10-CM

## 2023-07-12 LAB
ALBUMIN SERPL BCG-MCNC: 4.3 G/DL (ref 3.5–5.2)
ALP SERPL-CCNC: 194 U/L (ref 35–104)
ALT SERPL W P-5'-P-CCNC: 21 U/L (ref 0–50)
ANION GAP SERPL CALCULATED.3IONS-SCNC: 10 MMOL/L (ref 7–15)
AST SERPL W P-5'-P-CCNC: 31 U/L (ref 0–45)
BILIRUB SERPL-MCNC: 0.6 MG/DL
BUN SERPL-MCNC: 14.4 MG/DL (ref 8–23)
CALCIUM SERPL-MCNC: 9.5 MG/DL (ref 8.8–10.2)
CHLORIDE SERPL-SCNC: 103 MMOL/L (ref 98–107)
CREAT SERPL-MCNC: 0.91 MG/DL (ref 0.51–0.95)
DEPRECATED HCO3 PLAS-SCNC: 25 MMOL/L (ref 22–29)
GFR SERPL CREATININE-BSD FRML MDRD: 65 ML/MIN/1.73M2
GLUCOSE SERPL-MCNC: 106 MG/DL (ref 70–99)
POTASSIUM SERPL-SCNC: 4.3 MMOL/L (ref 3.4–5.3)
PROT SERPL-MCNC: 7.9 G/DL (ref 6.4–8.3)
SODIUM SERPL-SCNC: 138 MMOL/L (ref 136–145)

## 2023-07-12 PROCEDURE — 80053 COMPREHEN METABOLIC PANEL: CPT

## 2023-07-12 PROCEDURE — 99207 PR NO CHARGE NURSE ONLY: CPT | Performed by: INTERNAL MEDICINE

## 2023-07-12 PROCEDURE — 36415 COLL VENOUS BLD VENIPUNCTURE: CPT

## 2023-07-13 ENCOUNTER — MYC MEDICAL ADVICE (OUTPATIENT)
Dept: CARDIOLOGY | Facility: CLINIC | Age: 73
End: 2023-07-13
Payer: COMMERCIAL

## 2023-07-13 NOTE — TELEPHONE ENCOUNTER
Date: 7/13/2023    Time of Call: 1:50 PM     Diagnosis:  HTN     [ VORB ] Ordering provider: Dr. Torres  Order: decrease losartan back to 50 mg BID   check BP on both arms at time of visit on 7/27  Order received by: Tamiko Luna RN       Follow-up/additional notes: patient informed and is agreeable with the plan.  Advised patient to reach out in the mean time if symptoms arise.  Patient verbalized understanding.

## 2023-07-13 NOTE — TELEPHONE ENCOUNTER
Pt hx of CAD, PCI 2013  Last seen in clinic 11/22 carvedilol was increased to 25 mg BID but caused her nausea so this was stopped and losartan was increased to 50 mg BID    12/2022 pt increased losartan on her own to 100 mg in am and 50 mg in pm. She now needs a refill    7/12/23 came in for a nurse BP visit-BP elevated Right arm 176-105  Left arm was 148/85    Pt is asymptomatic-denies chest pain, shortness of breath, lightheadedness, palpitations.  Does state that she worries about her balance but this has been going on for years.    Patient cardiac medications are losartan, statin and asa    Scheduled to see Dr. Torres 7/27/23.    Will route to Dr. Torres to see if any recommendations.    Labs below:    Tamiko Luna RN  Cardiology Care Coordinator  [facility]  [phone] option 1        Labs drawn:   Last Comprehensive Metabolic Panel:  Sodium   Date Value Ref Range Status   07/12/2023 138 136 - 145 mmol/L Final   05/29/2019 143 133 - 144 mmol/L Final     Potassium   Date Value Ref Range Status   07/12/2023 4.3 3.4 - 5.3 mmol/L Final   05/29/2019 4.4 3.4 - 5.3 mmol/L Final     Chloride   Date Value Ref Range Status   07/12/2023 103 98 - 107 mmol/L Final   05/29/2019 109 94 - 109 mmol/L Final     Carbon Dioxide   Date Value Ref Range Status   05/29/2019 30 20 - 32 mmol/L Final     Carbon Dioxide (CO2)   Date Value Ref Range Status   07/12/2023 25 22 - 29 mmol/L Final     Anion Gap   Date Value Ref Range Status   07/12/2023 10 7 - 15 mmol/L Final   05/29/2019 4 3 - 14 mmol/L Final     Glucose   Date Value Ref Range Status   07/12/2023 106 (H) 70 - 99 mg/dL Final   05/29/2019 102 (H) 70 - 99 mg/dL Final     Urea Nitrogen   Date Value Ref Range Status   07/12/2023 14.4 8.0 - 23.0 mg/dL Final   05/29/2019 13 7 - 30 mg/dL Final     Creatinine   Date Value Ref Range Status   07/12/2023 0.91 0.51 - 0.95 mg/dL Final   05/29/2019 0.90 0.52 - 1.04 mg/dL Final     GFR Estimate   Date Value Ref  Range Status   07/12/2023 65 >60 mL/min/1.73m2 Final   05/29/2019 65 >60 mL/min/[1.73_m2] Final     Comment:     Non  GFR Calc  Starting 12/18/2018, serum creatinine based estimated GFR (eGFR) will be   calculated using the Chronic Kidney Disease Epidemiology Collaboration   (CKD-EPI) equation.       Calcium   Date Value Ref Range Status   07/12/2023 9.5 8.8 - 10.2 mg/dL Final   05/29/2019 9.2 8.5 - 10.1 mg/dL Final     Bilirubin Total   Date Value Ref Range Status   07/12/2023 0.6 <=1.2 mg/dL Final   05/29/2019 0.5 0.2 - 1.3 mg/dL Final     Alkaline Phosphatase   Date Value Ref Range Status   07/12/2023 194 (H) 35 - 104 U/L Final   05/29/2019 204 (H) 40 - 150 U/L Final     ALT   Date Value Ref Range Status   07/12/2023 21 0 - 50 U/L Final     Comment:     Reference intervals for this test were updated on 6/12/2023 to more accurately reflect our healthy population. There may be differences in the flagging of prior results with similar values performed with this method. Interpretation of those prior results can be made in the context of the updated reference intervals.     05/29/2019 25 0 - 50 U/L Final     AST   Date Value Ref Range Status   07/12/2023 31 0 - 45 U/L Final     Comment:     Reference intervals for this test were updated on 6/12/2023 to more accurately reflect our healthy population. There may be differences in the flagging of prior results with similar values performed with this method. Interpretation of those prior results can be made in the context of the updated reference intervals.   05/29/2019 17 0 - 45 U/L Final                Partial Purse String (Simple) Text: Given the location of the defect and the characteristics of the surrounding skin a simple purse string closure was deemed most appropriate.  Undermining was performed circumfirentially around the surgical defect.  A purse string suture was then placed and tightened. Wound tension only allowed a partial closure of the circular defect.

## 2023-07-25 ENCOUNTER — TELEPHONE (OUTPATIENT)
Dept: CARDIOLOGY | Facility: CLINIC | Age: 73
End: 2023-07-25
Payer: COMMERCIAL

## 2023-07-25 NOTE — TELEPHONE ENCOUNTER
Pt is scheduled 7/27 Dr. Torres to reassess BP.    Tamiko Luna, RN  Cardiology Care Coordinator  [facility]  [phone] option 1

## 2023-07-25 NOTE — TELEPHONE ENCOUNTER
Messaged patient to review recent elevated blood pressure reading.  Per [place] Community [place], patients blood pressure is out of parameters and recheck blood pressure is recommended.    Recent Blood Pressure: 163/79   Date: 7/17/23    Patient reported blood pressure updated in Epic. Blood pressure continues to fall outside of the [place] Community Me[place] guidelines.  Message sent to primary cardiology team for further review.

## 2023-07-27 ENCOUNTER — OFFICE VISIT (OUTPATIENT)
Dept: CARDIOLOGY | Facility: CLINIC | Age: 73
End: 2023-07-27
Payer: COMMERCIAL

## 2023-07-27 VITALS
SYSTOLIC BLOOD PRESSURE: 168 MMHG | WEIGHT: 205 LBS | BODY MASS INDEX: 35.19 KG/M2 | OXYGEN SATURATION: 95 % | DIASTOLIC BLOOD PRESSURE: 82 MMHG | HEART RATE: 85 BPM

## 2023-07-27 DIAGNOSIS — Z98.61 STATUS POST PERCUTANEOUS TRANSLUMINAL CORONARY ANGIOPLASTY: ICD-10-CM

## 2023-07-27 DIAGNOSIS — E78.5 HYPERLIPIDEMIA WITH TARGET LDL LESS THAN 100: ICD-10-CM

## 2023-07-27 DIAGNOSIS — I10 HYPERTENSION GOAL BP (BLOOD PRESSURE) < 140/90: ICD-10-CM

## 2023-07-27 PROCEDURE — 99214 OFFICE O/P EST MOD 30 MIN: CPT | Performed by: INTERNAL MEDICINE

## 2023-07-27 RX ORDER — AMLODIPINE BESYLATE 5 MG/1
5 TABLET ORAL DAILY
Qty: 90 TABLET | Refills: 3 | Status: SHIPPED | OUTPATIENT
Start: 2023-07-27 | End: 2023-09-11

## 2023-07-27 RX ORDER — ASPIRIN 81 MG/1
81 TABLET ORAL DAILY
COMMUNITY

## 2023-07-27 NOTE — LETTER
7/27/2023      RE: Sharri Elaine  [address] [facility] [address]       Dear Colleague,    Thank you for the opportunity to participate in the care of your patient, Sharri Elaine, at the [facility] HEART CLINIC [facility]. Please see a copy of my visit note below.       SUBJECTIVE: Sharri Elaine is a 73 year old female who presents for follow-up.Coronary artery disease.     Patient had proximal LAD stent at [facility] in 4/2010.  Had in-stent restenosis and had repeat stenting in 11/3/2010.  Since then patient had intermittent chest pain.  In 2013 patient had a exercise stress echo.  This was reported as showing inferior wall ischemia.  Subsequently patient had a coronary angiogram which showed no flow-limiting lesions.  30 to 40% stenosis involving all 3 vessels.  There was 30% in-stent restenosis.   Patient's cardiac risk factors are hypertension and hyperlipidemia.  No diabetes.  Due to chest pain patient had an admission and an exercise stress echo in 2019 which was normal.  During previous visit patient complained of chest pain and had a Lexiscan on 11/17/2021 which was completely normal.  Patient had 2 concerns.  Elevated blood pressure and significant difference in the blood pressure between right and left upper extremity.  She has no symptoms.  No new cardiac symptoms.    Patient Active Problem List    Diagnosis Date Noted     Health Care Home 06/28/2011     Priority: High     DX V65.8 REPLACED WITH 45514 HEALTH CARE HOME (04/08/2013)       Chest pain 05/12/2016     Priority: Medium     Acute chest pain 05/12/2016     Priority: Medium     Advanced directives, counseling/discussion 05/04/2012     Priority: Medium     Advance Directive received and scanned. Click on Code in the patient header to view. 5/4/2012          Rosacea 08/20/2010     Priority: Medium     Hypertension goal BP (blood pressure) < 140/90 07/14/2010      Priority: Medium     Goal <130/80       Hyperlipidemia with target LDL less than 100 07/14/2010     Priority: Medium     January 4, 2011 - . I have asked this patient to obtain a fasting lipid profile tomorrow in Ghada Bermeo's office. She is complaining of some muscle weakness and/or achiness, especially in her legs. Perhaps simvastatin at 40 mg will need to be changed to something more potent such as Crestor. If she is having myalgias or myopathy, a change to a water soluble statin would be in order.   Diagnosis updated by automated process. Provider to review and confirm.       CAD (coronary artery disease)      Priority: Medium     Status post LAD stenting 06/28 for unstable angina. She had in-stent restenosis in the LAD with further stenting on 11/03/2010 when she developed recurrent angina and was hospitalized at [facility]. Coronary angiography demonstrated a 60% circumflex ostial stenosis, a 95% proximal LAD in-stent restenosis along with a 50% ostial stenosis that was unchanged. The right coronary artery had a mild 10%-20% luminal narrowing but nothing significant. Her ejection fraction at that time was 45%. The in-stent restenosis was restented with an [place] drug-eluting stent.   Plavix was changed to Effient and the cardiologist, Dr. Torrez, felt that Effient should be continued at least 2 years or preferentially longer term. Nuclear stress test late summer 2011. We will consider a stress test earlier than that should the patient have symptoms.    Follow up with Cardiology in 6 months        Preinfarction syndrome (H)      Priority: Medium     (Problem list name updated by automated process. Provider to review and confirm.)      .  Current Outpatient Medications   Medication Sig     aspirin 81 MG EC tablet Take 81 mg by mouth daily     atorvastatin (LIPITOR) 40 MG tablet Take 1 tablet (40 mg) by mouth daily     diphenhydrAMINE-acetaminophen (TYLENOL PM)  MG tablet Take 1 tablet  by mouth At Bedtime.     esomeprazole (NEXIUM) 20 MG DR capsule Take 20 mg by mouth every morning (before breakfast) Take 30-60 minutes before eating.     losartan (COZAAR) 50 MG tablet Take 1 tablet (50 mg) by mouth 2 times daily     nitroGLYcerin (NITROSTAT) 0.4 MG sublingual tablet Place 1 tablet (0.4 mg) under the tongue every 5 minutes as needed for chest pain     ORDER FOR DME Blood pressure cuff     No current facility-administered medications for this visit.     Past Medical History:   Diagnosis Date     Acne rosacea      [place], unstable 2010     CAD (coronary artery disease) 2010    LAD stent x 2     Fibrocystic breast      Hepatitis     age 18     Iritis     14 YEARS OLD     Menopause     AN HRT     MI (myocardial infarction) (H)     non T wave MI     NONSPECIFIC MEDICAL HISTORY 7/14/09    HEART MURMUR- NORMAL ECHO     Shingles 05/2017     Past Surgical History:   Procedure Laterality Date     ANGIOGRAM  6/2010     angiogram  11/3/2010    in stent restenosis     PVD STENTING  2010     x 2, LAD     Allergies   Allergen Reactions     Carvedilol Nausea     Lisinopril Cough     Sulfa Antibiotics Rash     Social History     Socioeconomic History     Marital status:      Spouse name: Not on file     Number of children: Not on file     Years of education: Not on file     Highest education level: Not on file   Occupational History     Not on file   Tobacco Use     Smoking status: Never     Smokeless tobacco: Never     Tobacco comments:     never smoker; non-smoking household   Substance and Sexual Activity     Alcohol use: Yes     Comment: 1 glass of wine almost every day     Drug use: No     Comment: never     Sexual activity: Yes     Partners: Male   Other Topics Concern     Parent/sibling w/ CABG, MI or angioplasty before 65F 55M? No   Social History Narrative     Not on file     Social Determinants of Health     Financial Resource Strain: Not on file   Food Insecurity: Not on file   Transportation  Needs: Not on file   Physical Activity: Not on file   Stress: Not on file   Social Connections: Not on file   Intimate Partner Violence: Not on file   Housing Stability: Not on file     Family History   Problem Relation Age of Onset     Hypertension Mother      Heart Disease Mother         CHF     Alzheimer Disease Father      Hypertension Daughter      High cholesterol Daughter      Breast Cancer No family hx of           REVIEW OF SYSTEMS:  General: negative, fever, chills, night sweats  Skin: negative, acne, rash, and scaling  Eyes: negative, double vision, eye pain, and photophobia  Ears/Nose/Throat: negative, nasal congestion, and purulent rhinorrhea  Respiratory: No shortness of breath, dyspnea on exertion, cough, or hemoptysis, No dyspnea on exertion, No cough, and negative  Cardiovascular: negative, palpitations, tachycardia, irregular heart beat, chest pain, exertional chest pain or pressure, paroxysmal nocturnal dyspnea, dyspnea on exertion, and orthopnea       OBJECTIVE:  Blood pressure (!) 168/82, pulse 85, weight 93 kg (205 lb), SpO2 95 %, not currently breastfeeding.  General Appearance: alert and no distress  Head: Normocephalic. No masses, lesions, tenderness or abnormalities  Eyes: conjuctiva clear, PERRL, EOM intact  Ears: External ears normal. Canals clear. TM's normal.  Nose: Nares normal  Mouth: normal  Neck: Supple, no cervical adenopathy, no thyromegaly  Lungs: clear to auscultation  Cardiac: regular rate and rhythm, normal S1 and S2, no murmur       ASSESSMENT/PLAN:  Patient here for follow-up.  Known coronary artery disease with a history of PCI as above.  Today her major concern was increased blood pressure as well as significant difference in blood pressure between right and left upper extremity.  In clinic today her blood pressure is elevated.  But no other clinically significant difference in blood pressure between the upper extremities.  Patient had no chest pain or other cardiac  symptoms.  She did not tolerate ACE inhibitor as well as carvedilol.  Currently on losartan 50 mg twice a day.  Will add Norvasc 5 mg daily at nighttime.  Patient will record blood pressure once a week for 2 months and call clinic with the results.  Further medication adjustment to be done depending upon the home blood pressure.  Total visit duration 20 minutes.  This included face-to-face interview, physical exam, chart review, review of latest Lexiscan and documentation.      Please do not hesitate to contact me if you have any questions/concerns.     Sincerely,     LUCA Gillespie MD

## 2023-07-27 NOTE — PROGRESS NOTES
SUBJECTIVE: Sharri Elaine is a 73 year old female who presents for follow-up.Coronary artery disease.     Patient had proximal LAD stent at [facility] in 4/2010.  Had in-stent restenosis and had repeat stenting in 11/3/2010.  Since then patient had intermittent chest pain.  In 2013 patient had a exercise stress echo.  This was reported as showing inferior wall ischemia.  Subsequently patient had a coronary angiogram which showed no flow-limiting lesions.  30 to 40% stenosis involving all 3 vessels.  There was 30% in-stent restenosis.   Patient's cardiac risk factors are hypertension and hyperlipidemia.  No diabetes.  Due to chest pain patient had an admission and an exercise stress echo in 2019 which was normal.  During previous visit patient complained of chest pain and had a Lexiscan on 11/17/2021 which was completely normal.  Patient had 2 concerns.  Elevated blood pressure and significant difference in the blood pressure between right and left upper extremity.  She has no symptoms.  No new cardiac symptoms.    Patient Active Problem List    Diagnosis Date Noted    Health Care Home 06/28/2011     Priority: High     DX V65.8 REPLACED WITH 094[facility]ALTH CARE HOME (04/08/2013)      Chest pain 05/12/2016     Priority: Medium    Acute chest pain 05/12/2016     Priority: Medium    Advanced directives, counseling/discussion 05/04/2012     Priority: Medium     Advance Directive received and scanned. Click on Code in the patient header to view. 5/4/2012         Rosacea 08/20/2010     Priority: Medium    Hypertension goal BP (blood pressure) < 140/90 07/14/2010     Priority: Medium     Goal <130/80      Hyperlipidemia with target LDL less than 100 07/14/2010     Priority: Medium     January 4, 2011 - . I have asked this patient to obtain a fasting lipid profile tomorrow in Ghada Bermeo's office. She is complaining of some muscle weakness and/or achiness, especially in her legs. Perhaps simvastatin at 40 mg will  need to be changed to something more potent such as Crestor. If she is having myalgias or myopathy, a change to a water soluble statin would be in order.   Diagnosis updated by automated process. Provider to review and confirm.      CAD (coronary artery disease)      Priority: Medium     Status post LAD stenting 06/28 for unstable angina. She had in-stent restenosis in the LAD with further stenting on 11/03/2010 when she developed recurrent angina and was hospitalized at [facility]. Coronary angiography demonstrated a 60% circumflex ostial stenosis, a 95% proximal LAD in-stent restenosis along with a 50% ostial stenosis that was unchanged. The right coronary artery had a mild 10%-20% luminal narrowing but nothing significant. Her ejection fraction at that time was 45%. The in-stent restenosis was restented with an [place] drug-eluting stent.   Plavix was changed to Effient and the cardiologist, Dr. Torrez, felt that Effient should be continued at least 2 years or preferentially longer term. Nuclear stress test late summer 2011. We will consider a stress test earlier than that should the patient have symptoms.    Follow up with Cardiology in 6 months       Preinfarction syndrome (H)      Priority: Medium     (Problem list name updated by automated process. Provider to review and confirm.)      .  Current Outpatient Medications   Medication Sig    aspirin 81 MG EC tablet Take 81 mg by mouth daily    atorvastatin (LIPITOR) 40 MG tablet Take 1 tablet (40 mg) by mouth daily    diphenhydrAMINE-acetaminophen (TYLENOL PM)  MG tablet Take 1 tablet by mouth At Bedtime.    esomeprazole (NEXIUM) 20 MG DR capsule Take 20 mg by mouth every morning (before breakfast) Take 30-60 minutes before eating.    losartan (COZAAR) 50 MG tablet Take 1 tablet (50 mg) by mouth 2 times daily    nitroGLYcerin (NITROSTAT) 0.4 MG sublingual tablet Place 1 tablet (0.4 mg) under the tongue every 5 minutes as needed for chest  pain    ORDER FOR DME Blood pressure cuff     No current facility-administered medications for this visit.     Past Medical History:   Diagnosis Date    Acne rosacea     [place], unstable 2010    CAD (coronary artery disease) 2010    LAD stent x 2    Fibrocystic breast     Hepatitis     age 18    Iritis     14 YEARS OLD    Menopause     AN HRT    MI (myocardial infarction) (H)     non T wave MI    NONSPECIFIC MEDICAL HISTORY 7/14/09    HEART MURMUR- NORMAL ECHO    Shingles 05/2017     Past Surgical History:   Procedure Laterality Date    ANGIOGRAM  6/2010    angiogram  11/3/2010    in stent restenosis    PVD STENTING  2010     x 2, LAD     Allergies   Allergen Reactions    Carvedilol Nausea    Lisinopril Cough    Sulfa Antibiotics Rash     Social History     Socioeconomic History    Marital status:      Spouse name: Not on file    Number of children: Not on file    Years of education: Not on file    Highest education level: Not on file   Occupational History    Not on file   Tobacco Use    Smoking status: Never    Smokeless tobacco: Never    Tobacco comments:     never smoker; non-smoking household   Substance and Sexual Activity    Alcohol use: Yes     Comment: 1 glass of wine almost every day    Drug use: No     Comment: never    Sexual activity: Yes     Partners: Male   Other Topics Concern    Parent/sibling w/ CABG, MI or angioplasty before 65F 55M? No   Social History Narrative    Not on file     Social Determinants of Health     Financial Resource Strain: Not on file   Food Insecurity: Not on file   Transportation Needs: Not on file   Physical Activity: Not on file   Stress: Not on file   Social Connections: Not on file   Intimate Partner Violence: Not on file   Housing Stability: Not on file     Family History   Problem Relation Age of Onset    Hypertension Mother     Heart Disease Mother         CHF    Alzheimer Disease Father     Hypertension Daughter     High cholesterol Daughter     Breast Cancer  No family hx of           REVIEW OF SYSTEMS:  General: negative, fever, chills, night sweats  Skin: negative, acne, rash, and scaling  Eyes: negative, double vision, eye pain, and photophobia  Ears/Nose/Throat: negative, nasal congestion, and purulent rhinorrhea  Respiratory: No shortness of breath, dyspnea on exertion, cough, or hemoptysis, No dyspnea on exertion, No cough, and negative  Cardiovascular: negative, palpitations, tachycardia, irregular heart beat, chest pain, exertional chest pain or pressure, paroxysmal nocturnal dyspnea, dyspnea on exertion, and orthopnea       OBJECTIVE:  Blood pressure (!) 168/82, pulse 85, weight 93 kg (205 lb), SpO2 95 %, not currently breastfeeding.  General Appearance: alert and no distress  Head: Normocephalic. No masses, lesions, tenderness or abnormalities  Eyes: conjuctiva clear, PERRL, EOM intact  Ears: External ears normal. Canals clear. TM's normal.  Nose: Nares normal  Mouth: normal  Neck: Supple, no cervical adenopathy, no thyromegaly  Lungs: clear to auscultation  Cardiac: regular rate and rhythm, normal S1 and S2, no murmur       ASSESSMENT/PLAN:  Patient here for follow-up.  Known coronary artery disease with a history of PCI as above.  Today her major concern was increased blood pressure as well as significant difference in blood pressure between right and left upper extremity.  In clinic today her blood pressure is elevated.  But no other clinically significant difference in blood pressure between the upper extremities.  Patient had no chest pain or other cardiac symptoms.  She did not tolerate ACE inhibitor as well as carvedilol.  Currently on losartan 50 mg twice a day.  Will add Norvasc 5 mg daily at nighttime.  Patient will record blood pressure once a week for 2 months and call clinic with the results.  Further medication adjustment to be done depending upon the home blood pressure.  Total visit duration 20 minutes.  This included face-to-face interview,  physical exam, chart review, review of latest Lexiscan and documentation.

## 2023-07-27 NOTE — PATIENT INSTRUCTIONS
Thank you for coming to the [facility] [place]; please note the following instructions:    1. Start taking amlodipine 5 mg daily at night    2. Cardiology Providers: Dr. DAVIDE Torres would like you to return for a cardiac follow up in 1 year  (July).  We will contact you regarding your appointment when the time draws closer or you may call [phone] option #1 to arrange an appointment.  Mean while, if you should have any questions or concerns regarding your heart health, please contact us.  Thank you for choosing [facility] for your care.         If you have any questions regarding your visit please contact your care team:     Cardiology  Telephone Number   Tamiko LORENZO., RN  Marni QUILGEY, RN   Loraine GRAY, RMCESAR CANO, SUZIE STYLES, Visit Facilitator  Hoa PRECIADO [facility] [phone] (option 1)   For scheduling appts:     [phone] (select option 1)       For the Device Clinic (Pacemakers and ICD's)  RN's :  Nae Hodge   During business hours: [phone]    *After business hours:  [phone] (select option 4)      Normal test result notifications will be released via [employer] or mailed within 7 business days.  All other test results, will be communicated via telephone once reviewed by your cardiologist.    If you need a medication refill please contact your pharmacy.  Please allow 3 business days for your refill to be completed.    As always, thank you for trusting us with your health care needs!

## 2023-07-27 NOTE — NURSING NOTE
Med Reconcile: Reviewed and verified all current medications with the patient. The updated medication list was printed and given to the patient.    New Medication: Patient was educated regarding newly prescribed medication, including discussion of  the indication, administration, side effects, and when to report to MD or RN. Patient demonstrated understanding of this information and agreed to call with further questions or concerns. Patient to start norvasc 5 mg daily at bedtime. Patient verbalized understanding. Patient stated that she will call if she has any concerns with medications.    Return Appointment: Patient given instructions regarding scheduling next clinic visit. Patient demonstrated understanding of this information and agreed to call with further questions or concerns. Patient to follow up in 1 year.     Patient stated she understood all health information given and agreed to call with further questions or concerns.    Bianka Kapoor RN, BSN  Cardiology RN Care Coordinator   [place]   Phone: [phone]  Fax: [phone] ([place]) [phone] ([place])

## 2023-09-11 ENCOUNTER — TELEPHONE (OUTPATIENT)
Dept: CARDIOLOGY | Facility: CLINIC | Age: 73
End: 2023-09-11
Payer: COMMERCIAL

## 2023-09-11 NOTE — TELEPHONE ENCOUNTER
"Spoke to patient that reports that she has has been experiencing a \"discomfort\" located by her sternum.  This symptom has been there for \"a long time\" but feels it did get worse when she started amlodipine.  Feels it is related to amlodipine.  Is worried amlodipine is effecting her liver.  Is scheduled with primary care this Friday in [facility].  Last week she stopped amlodipine but did take a tablet yesterday morning but it made her symptom \"gut worse\".    Also c/o \"Flu\" like symptoms, chills no energy.  Neg covid tests.  Felt achy but does not feel this is related to amlodipine.    Writer explained in detail that she needs blood pressure control and sometimes it takes 3-4 BP medications for control.  She verbalized understanding and is completely aware of this.  At this time she would like to continue to stay off of amlodipine and follow-up with primary care first then go from there.  Patient states she will notify cardiology after her primary care visit.    JARED Torres.    Tamiko Luna RN  Cardiology Care Coordinator  [facility]  [phone] option 1        "

## 2023-09-11 NOTE — TELEPHONE ENCOUNTER
Health Call Center    Phone Message    May a detailed message be left on voicemail: yes     Reason for Call: Medication Question or concern regarding medication   Prescription Clarification  Name of Medication: amLODIPine (NORVASC) 5 MG tablet   Prescribing Provider: Dr. Gillespie   Pharmacy:    What on the order needs clarification? Patient states this medication gives her stomach pain and is unale to take this. Please call the patient to discuss.       Action Taken: Other: cardiology    Travel Screening: Not Applicable  Thank you!  [facility]

## 2023-10-02 ENCOUNTER — ANCILLARY ORDERS (OUTPATIENT)
Dept: MRI IMAGING | Facility: CLINIC | Age: 73
End: 2023-10-02

## 2023-10-02 DIAGNOSIS — R74.8 ELEVATED LIVER ENZYMES: Primary | ICD-10-CM

## 2023-10-02 DIAGNOSIS — K59.09 CHRONIC CONSTIPATION: ICD-10-CM

## 2023-10-03 ENCOUNTER — ANCILLARY ORDERS (OUTPATIENT)
Dept: MRI IMAGING | Facility: CLINIC | Age: 73
End: 2023-10-03

## 2023-10-03 DIAGNOSIS — R74.8 ELEVATED LIVER ENZYMES: Primary | ICD-10-CM

## 2023-10-03 DIAGNOSIS — K59.09 CHRONIC CONSTIPATION: ICD-10-CM

## 2023-10-05 ENCOUNTER — IMMUNIZATION (OUTPATIENT)
Dept: FAMILY MEDICINE | Facility: CLINIC | Age: 73
End: 2023-10-05
Payer: COMMERCIAL

## 2023-10-05 PROCEDURE — 90480 ADMN SARSCOV2 VAC 1/ONLY CMP: CPT

## 2023-10-05 PROCEDURE — 90662 IIV NO PRSV INCREASED AG IM: CPT

## 2023-10-05 PROCEDURE — G0008 ADMIN INFLUENZA VIRUS VAC: HCPCS | Mod: 59

## 2023-10-05 PROCEDURE — 91320 SARSCV2 VAC 30MCG TRS-SUC IM: CPT

## 2023-10-09 ENCOUNTER — TELEPHONE (OUTPATIENT)
Dept: CARDIOLOGY | Facility: CLINIC | Age: 73
End: 2023-10-09
Payer: COMMERCIAL

## 2023-10-09 NOTE — TELEPHONE ENCOUNTER
[facility] Call Center    Phone Message    May a detailed message be left on voicemail: yes     Reason for Call: Other: Please call pt to discuss continuing amlodipine.     Action Taken: Other: cardio    Travel Screening: Not Applicable    Thank you!  [facility]

## 2023-10-09 NOTE — TELEPHONE ENCOUNTER
Please see [employer] message 9/28.    Tamiko Luna, RN  Cardiology Care Coordinator  [facility]  [phone] option 1

## 2023-10-12 ENCOUNTER — ANCILLARY ORDERS (OUTPATIENT)
Dept: MRI IMAGING | Facility: CLINIC | Age: 73
End: 2023-10-12

## 2023-10-12 ENCOUNTER — HOSPITAL ENCOUNTER (OUTPATIENT)
Dept: MRI IMAGING | Facility: CLINIC | Age: 73
Discharge: HOME | End: 2023-10-12
Attending: INTERNAL MEDICINE | Admitting: INTERNAL MEDICINE
Payer: MEDICARE

## 2023-10-12 ENCOUNTER — LAB (OUTPATIENT)
Dept: LAB | Facility: CLINIC | Age: 73
End: 2023-10-12
Payer: COMMERCIAL

## 2023-10-12 DIAGNOSIS — R74.8 ELEVATED LIVER ENZYMES: ICD-10-CM

## 2023-10-12 DIAGNOSIS — K59.09 CHRONIC CONSTIPATION: ICD-10-CM

## 2023-10-12 DIAGNOSIS — R74.8 ACID PHOSPHATASE ELEVATED: ICD-10-CM

## 2023-10-12 DIAGNOSIS — R74.8 ACID PHOSPHATASE ELEVATED: Primary | ICD-10-CM

## 2023-10-12 DIAGNOSIS — R74.8 ELEVATED LIVER ENZYMES: Primary | ICD-10-CM

## 2023-10-12 LAB
ALBUMIN SERPL BCG-MCNC: 4.2 G/DL (ref 3.5–5.2)
ALP SERPL-CCNC: 193 U/L (ref 35–104)
ALT SERPL W P-5'-P-CCNC: 24 U/L (ref 0–50)
ANION GAP SERPL CALCULATED.3IONS-SCNC: 9 MMOL/L (ref 7–15)
AST SERPL W P-5'-P-CCNC: 23 U/L (ref 0–45)
BASO+EOS+MONOS # BLD AUTO: NORMAL 10*3/UL
BASO+EOS+MONOS NFR BLD AUTO: NORMAL %
BASOPHILS # BLD AUTO: 0.1 10E3/UL (ref 0–0.2)
BASOPHILS NFR BLD AUTO: 1 %
BILIRUB SERPL-MCNC: 0.4 MG/DL
BUN SERPL-MCNC: 16.2 MG/DL (ref 8–23)
CALCIUM SERPL-MCNC: 9.7 MG/DL (ref 8.8–10.2)
CHLORIDE SERPL-SCNC: 105 MMOL/L (ref 98–107)
CREAT SERPL-MCNC: 1 MG/DL (ref 0.51–0.95)
DEPRECATED HCO3 PLAS-SCNC: 28 MMOL/L (ref 22–29)
EGFRCR SERPLBLD CKD-EPI 2021: 58 ML/MIN/1.73M2
EOSINOPHIL # BLD AUTO: 0.3 10E3/UL (ref 0–0.7)
EOSINOPHIL NFR BLD AUTO: 4 %
ERYTHROCYTE [DISTWIDTH] IN BLOOD BY AUTOMATED COUNT: 12.8 % (ref 10–15)
FERRITIN SERPL-MCNC: 141 NG/ML (ref 11–328)
GLUCOSE SERPL-MCNC: 72 MG/DL (ref 70–99)
HCT VFR BLD AUTO: 43.5 % (ref 35–47)
HGB BLD-MCNC: 13.8 G/DL (ref 11.7–15.7)
IMM GRANULOCYTES # BLD: 0 10E3/UL
IMM GRANULOCYTES NFR BLD: 0 %
INR PPP: 0.92 (ref 0.85–1.15)
IRON BINDING CAPACITY (ROCHE): 329 UG/DL (ref 240–430)
IRON SATN MFR SERPL: 31 % (ref 15–46)
IRON SERPL-MCNC: 101 UG/DL (ref 37–145)
LYMPHOCYTES # BLD AUTO: 2.2 10E3/UL (ref 0.8–5.3)
LYMPHOCYTES NFR BLD AUTO: 29 %
MCH RBC QN AUTO: 30.7 PG (ref 26.5–33)
MCHC RBC AUTO-ENTMCNC: 31.7 G/DL (ref 31.5–36.5)
MCV RBC AUTO: 97 FL (ref 78–100)
MONOCYTES # BLD AUTO: 0.7 10E3/UL (ref 0–1.3)
MONOCYTES NFR BLD AUTO: 9 %
NEUTROPHILS # BLD AUTO: 4.5 10E3/UL (ref 1.6–8.3)
NEUTROPHILS NFR BLD AUTO: 58 %
PLATELET # BLD AUTO: 320 10E3/UL (ref 150–450)
POTASSIUM SERPL-SCNC: 3.9 MMOL/L (ref 3.4–5.3)
PROT SERPL-MCNC: 7.9 G/DL (ref 6.4–8.3)
RBC # BLD AUTO: 4.49 10E6/UL (ref 3.8–5.2)
SODIUM SERPL-SCNC: 142 MMOL/L (ref 135–145)
WBC # BLD AUTO: 7.8 10E3/UL (ref 4–11)

## 2023-10-12 PROCEDURE — 86364 TISS TRNSGLTMNASE EA IG CLAS: CPT

## 2023-10-12 PROCEDURE — 85025 COMPLETE CBC W/AUTO DIFF WBC: CPT

## 2023-10-12 PROCEDURE — 82103 ALPHA-1-ANTITRYPSIN TOTAL: CPT | Mod: 90

## 2023-10-12 PROCEDURE — 82784 ASSAY IGA/IGD/IGG/IGM EACH: CPT

## 2023-10-12 PROCEDURE — 82105 ALPHA-FETOPROTEIN SERUM: CPT

## 2023-10-12 PROCEDURE — 86038 ANTINUCLEAR ANTIBODIES: CPT

## 2023-10-12 PROCEDURE — 74183 MRI ABD W/O CNTR FLWD CNTR: CPT

## 2023-10-12 PROCEDURE — 86381 MITOCHONDRIAL ANTIBODY EACH: CPT

## 2023-10-12 PROCEDURE — 36415 COLL VENOUS BLD VENIPUNCTURE: CPT

## 2023-10-12 PROCEDURE — 83540 ASSAY OF IRON: CPT

## 2023-10-12 PROCEDURE — 258N000003 HC RX IP 258 OP 636: Performed by: INTERNAL MEDICINE

## 2023-10-12 PROCEDURE — 83516 IMMUNOASSAY NONANTIBODY: CPT | Mod: 90

## 2023-10-12 PROCEDURE — 85610 PROTHROMBIN TIME: CPT

## 2023-10-12 PROCEDURE — 86706 HEP B SURFACE ANTIBODY: CPT

## 2023-10-12 PROCEDURE — 86039 ANTINUCLEAR ANTIBODIES (ANA): CPT

## 2023-10-12 PROCEDURE — A9585 GADOBUTROL INJECTION: HCPCS | Performed by: INTERNAL MEDICINE

## 2023-10-12 PROCEDURE — 82390 ASSAY OF CERULOPLASMIN: CPT

## 2023-10-12 PROCEDURE — 99000 SPECIMEN HANDLING OFFICE-LAB: CPT

## 2023-10-12 PROCEDURE — 87340 HEPATITIS B SURFACE AG IA: CPT

## 2023-10-12 PROCEDURE — 80053 COMPREHEN METABOLIC PANEL: CPT

## 2023-10-12 PROCEDURE — 86708 HEPATITIS A ANTIBODY: CPT

## 2023-10-12 PROCEDURE — 255N000002 HC RX 255 OP 636: Performed by: INTERNAL MEDICINE

## 2023-10-12 PROCEDURE — 82104 ALPHA-1-ANTITRYPSIN PHENO: CPT | Mod: 90

## 2023-10-12 PROCEDURE — 86704 HEP B CORE ANTIBODY TOTAL: CPT

## 2023-10-12 PROCEDURE — 83550 IRON BINDING TEST: CPT

## 2023-10-12 PROCEDURE — 82785 ASSAY OF IGE: CPT

## 2023-10-12 PROCEDURE — 82728 ASSAY OF FERRITIN: CPT

## 2023-10-12 RX ORDER — GADOBUTROL 604.72 MG/ML
9 INJECTION INTRAVENOUS ONCE
Status: COMPLETED | OUTPATIENT
Start: 2023-10-12 | End: 2023-10-12

## 2023-10-12 RX ADMIN — SODIUM CHLORIDE 50 ML: 9 INJECTION, SOLUTION INTRAVENOUS at 13:54

## 2023-10-12 RX ADMIN — GADOBUTROL 9 ML: 604.72 INJECTION INTRAVENOUS at 13:49

## 2023-10-13 LAB
AFP SERPL-MCNC: 2.8 NG/ML
HAV IGG SER QL IA: NONREACTIVE
HBV CORE AB SERPL QL IA: NONREACTIVE
HBV SURFACE AB SERPL IA-ACNC: 0.31 M[IU]/ML
HBV SURFACE AB SERPL IA-ACNC: NONREACTIVE M[IU]/ML
HBV SURFACE AG SERPL QL IA: NONREACTIVE
IGA SERPL-MCNC: 401 MG/DL (ref 84–499)
IGE SERPL-ACNC: 12 KU/L (ref 0–114)
IGG SERPL-MCNC: 1360 MG/DL (ref 610–1616)
IGM SERPL-MCNC: 140 MG/DL (ref 35–242)
MITOCHONDRIA M2 IGG SER-ACNC: 1.4 U/ML
TTG IGA SER-ACNC: 0.8 U/ML
TTG IGG SER-ACNC: 1 U/ML

## 2023-10-15 LAB — SMA IGG SER-ACNC: 6 UNITS

## 2023-10-16 ENCOUNTER — DOCUMENTATION ONLY (OUTPATIENT)
Dept: OTHER | Facility: CLINIC | Age: 73
End: 2023-10-16
Payer: COMMERCIAL

## 2023-10-16 LAB
A1AT PHENOTYP SERPL-IMP: NORMAL
A1AT SERPL-MCNC: 146 MG/DL
ANA PAT SER IF-IMP: ABNORMAL
ANA SER QL IF: POSITIVE
ANA TITR SER IF: ABNORMAL {TITER}
CERULOPLASMIN SERPL-MCNC: 32 MG/DL (ref 20–60)

## 2023-10-17 ENCOUNTER — MYC MEDICAL ADVICE (OUTPATIENT)
Dept: CARDIOLOGY | Facility: CLINIC | Age: 73
End: 2023-10-17
Payer: COMMERCIAL

## 2023-10-17 DIAGNOSIS — E78.5 HYPERLIPIDEMIA WITH TARGET LDL LESS THAN 100: ICD-10-CM

## 2023-10-17 DIAGNOSIS — Z98.61 STATUS POST PERCUTANEOUS TRANSLUMINAL CORONARY ANGIOPLASTY: ICD-10-CM

## 2023-10-17 DIAGNOSIS — I10 HYPERTENSION GOAL BP (BLOOD PRESSURE) < 140/90: ICD-10-CM

## 2023-10-18 RX ORDER — AMLODIPINE BESYLATE 5 MG/1
5 TABLET ORAL DAILY
Qty: 90 TABLET | Refills: 3 | Status: SHIPPED | OUTPATIENT
Start: 2023-10-18 | End: 2024-01-03

## 2023-10-23 RX ORDER — LOSARTAN POTASSIUM 50 MG/1
50 TABLET ORAL 2 TIMES DAILY
Qty: 180 TABLET | Refills: 2 | Status: SHIPPED | OUTPATIENT
Start: 2023-10-23 | End: 2024-07-21

## 2023-12-14 ENCOUNTER — MYC MEDICAL ADVICE (OUTPATIENT)
Dept: CARDIOLOGY | Facility: CLINIC | Age: 73
End: 2023-12-14
Payer: COMMERCIAL

## 2023-12-14 DIAGNOSIS — I10 HYPERTENSION GOAL BP (BLOOD PRESSURE) < 140/90: ICD-10-CM

## 2023-12-14 DIAGNOSIS — E78.5 HYPERLIPIDEMIA WITH TARGET LDL LESS THAN 100: ICD-10-CM

## 2023-12-14 DIAGNOSIS — Z98.61 STATUS POST PERCUTANEOUS TRANSLUMINAL CORONARY ANGIOPLASTY: ICD-10-CM

## 2023-12-26 DIAGNOSIS — E78.5 HYPERLIPIDEMIA WITH TARGET LDL LESS THAN 100: ICD-10-CM

## 2023-12-26 DIAGNOSIS — I10 HYPERTENSION GOAL BP (BLOOD PRESSURE) < 140/90: ICD-10-CM

## 2023-12-29 NOTE — TELEPHONE ENCOUNTER
Called patient to review recent elevated blood pressure reading.  Per [place] Community Me[place] guidelines, patients blood pressure is out of parameters and recheck blood pressure is recommended.        Today's Blood Pressure: 167/74  Today's Heart Rate: 82  Location: Home BP    Patient reported blood pressure updated in Epic. Blood pressure continues to fall outside of the [place] Community [place] guidelines.  Message sent to primary cardiology team for further review.

## 2023-12-29 NOTE — TELEPHONE ENCOUNTER
LUCA Gillespie MD  You; Fk Cardiology4 hours ago (9:57 AM)     KM  S start the amlodipine 5 mg daily.  If she is already on this we can increase it to 10 mg daily at night.  Thanks [employer] message sent to patient to confirm patient has been been taking amlodipine 5 mg and if so to increase to 10 mg.    Bianka Kapoor, RN, BSN  Cardiology RN Care Coordinator   [place]   Phone: [phone]  Fax: [phone] ([place]) [phone] ([place])

## 2023-12-31 RX ORDER — ATORVASTATIN CALCIUM 40 MG/1
40 TABLET, FILM COATED ORAL DAILY
Qty: 90 TABLET | Refills: 0 | Status: SHIPPED | OUTPATIENT
Start: 2023-12-31 | End: 2024-04-02

## 2023-12-31 NOTE — TELEPHONE ENCOUNTER
atorvastatin (LIPITOR) 40 MG tablet   90 tablet 3 11/3/2022     7/27/2023  [facility] LUCA Wang MD  Cardiovascular Disease    Routed because: LDL

## 2024-01-03 RX ORDER — AMLODIPINE BESYLATE 10 MG/1
10 TABLET ORAL DAILY
Qty: 90 TABLET | Refills: 1 | Status: SHIPPED | OUTPATIENT
Start: 2024-01-03 | End: 2024-06-19

## 2024-01-18 NOTE — TELEPHONE ENCOUNTER
Called and LVM for patient to return call to clinic scheduler to schedule fasting lab work.     GLORIA Parra

## 2024-01-23 NOTE — TELEPHONE ENCOUNTER
Called and spoke to patient. Patient stated she got her 90 day refill and believes she still has 2 months left of this medication from other refills. She stated she will call to schedule labs when she feels she has about a month and a half of prescription left.    Message sent to RN for awareness.    GLORIA Parra

## 2024-02-10 ENCOUNTER — APPOINTMENT (OUTPATIENT)
Dept: ULTRASOUND IMAGING | Facility: CLINIC | Age: 74
End: 2024-02-10
Attending: FAMILY MEDICINE
Payer: MEDICARE

## 2024-02-10 ENCOUNTER — APPOINTMENT (OUTPATIENT)
Dept: CT IMAGING | Facility: CLINIC | Age: 74
End: 2024-02-10
Attending: FAMILY MEDICINE
Payer: MEDICARE

## 2024-02-10 ENCOUNTER — APPOINTMENT (OUTPATIENT)
Dept: GENERAL RADIOLOGY | Facility: CLINIC | Age: 74
End: 2024-02-10
Attending: FAMILY MEDICINE
Payer: MEDICARE

## 2024-02-10 ENCOUNTER — HOSPITAL ENCOUNTER (EMERGENCY)
Facility: CLINIC | Age: 74
Discharge: HOME | End: 2024-02-10
Attending: FAMILY MEDICINE | Admitting: FAMILY MEDICINE
Payer: MEDICARE

## 2024-02-10 VITALS
WEIGHT: 197 LBS | OXYGEN SATURATION: 95 % | BODY MASS INDEX: 33.81 KG/M2 | SYSTOLIC BLOOD PRESSURE: 151 MMHG | DIASTOLIC BLOOD PRESSURE: 77 MMHG | HEART RATE: 85 BPM | RESPIRATION RATE: 13 BRPM

## 2024-02-10 DIAGNOSIS — R07.9 CHEST PAIN, UNSPECIFIED TYPE: ICD-10-CM

## 2024-02-10 LAB
ALBUMIN SERPL BCG-MCNC: 4.3 G/DL (ref 3.5–5.2)
ALP SERPL-CCNC: 245 U/L (ref 40–150)
ALT SERPL W P-5'-P-CCNC: 24 U/L (ref 0–50)
ANION GAP SERPL CALCULATED.3IONS-SCNC: 13 MMOL/L (ref 7–15)
AST SERPL W P-5'-P-CCNC: 24 U/L (ref 0–45)
BASOPHILS # BLD AUTO: 0.1 10E3/UL (ref 0–0.2)
BASOPHILS NFR BLD AUTO: 1 %
BILIRUB DIRECT SERPL-MCNC: <0.2 MG/DL (ref 0–0.3)
BILIRUB SERPL-MCNC: 0.4 MG/DL
BUN SERPL-MCNC: 15.9 MG/DL (ref 8–23)
CALCIUM SERPL-MCNC: 9.9 MG/DL (ref 8.8–10.2)
CHLORIDE SERPL-SCNC: 102 MMOL/L (ref 98–107)
CREAT SERPL-MCNC: 0.85 MG/DL (ref 0.51–0.95)
D DIMER PPP FEU-MCNC: 0.93 UG/ML FEU (ref 0–0.5)
DEPRECATED HCO3 PLAS-SCNC: 25 MMOL/L (ref 22–29)
EGFRCR SERPLBLD CKD-EPI 2021: 71 ML/MIN/1.73M2
EOSINOPHIL # BLD AUTO: 0.6 10E3/UL (ref 0–0.7)
EOSINOPHIL NFR BLD AUTO: 7 %
ERYTHROCYTE [DISTWIDTH] IN BLOOD BY AUTOMATED COUNT: 13.1 % (ref 10–15)
GLUCOSE SERPL-MCNC: 117 MG/DL (ref 70–99)
HCT VFR BLD AUTO: 42.6 % (ref 35–47)
HGB BLD-MCNC: 14.1 G/DL (ref 11.7–15.7)
HOLD SPECIMEN: NORMAL
HOLD SPECIMEN: NORMAL
IMM GRANULOCYTES # BLD: 0 10E3/UL
IMM GRANULOCYTES NFR BLD: 0 %
LIPASE SERPL-CCNC: 14 U/L (ref 13–60)
LYMPHOCYTES # BLD AUTO: 2.5 10E3/UL (ref 0.8–5.3)
LYMPHOCYTES NFR BLD AUTO: 33 %
MCH RBC QN AUTO: 31.4 PG (ref 26.5–33)
MCHC RBC AUTO-ENTMCNC: 33.1 G/DL (ref 31.5–36.5)
MCV RBC AUTO: 95 FL (ref 78–100)
MONOCYTES # BLD AUTO: 0.5 10E3/UL (ref 0–1.3)
MONOCYTES NFR BLD AUTO: 7 %
NEUTROPHILS # BLD AUTO: 3.8 10E3/UL (ref 1.6–8.3)
NEUTROPHILS NFR BLD AUTO: 52 %
NRBC # BLD AUTO: 0 10E3/UL
NRBC BLD AUTO-RTO: 0 /100
PLATELET # BLD AUTO: 318 10E3/UL (ref 150–450)
POTASSIUM SERPL-SCNC: 3.7 MMOL/L (ref 3.4–5.3)
PROT SERPL-MCNC: 8.3 G/DL (ref 6.4–8.3)
RBC # BLD AUTO: 4.49 10E6/UL (ref 3.8–5.2)
SODIUM SERPL-SCNC: 140 MMOL/L (ref 135–145)
TROPONIN T SERPL HS-MCNC: 12 NG/L
TROPONIN T SERPL HS-MCNC: 9 NG/L
WBC # BLD AUTO: 7.5 10E3/UL (ref 4–11)

## 2024-02-10 PROCEDURE — 71046 X-RAY EXAM CHEST 2 VIEWS: CPT

## 2024-02-10 PROCEDURE — 99285 EMERGENCY DEPT VISIT HI MDM: CPT | Mod: 25 | Performed by: FAMILY MEDICINE

## 2024-02-10 PROCEDURE — 250N000011 HC RX IP 250 OP 636: Performed by: FAMILY MEDICINE

## 2024-02-10 PROCEDURE — 80048 BASIC METABOLIC PNL TOTAL CA: CPT | Performed by: FAMILY MEDICINE

## 2024-02-10 PROCEDURE — 93005 ELECTROCARDIOGRAM TRACING: CPT | Performed by: FAMILY MEDICINE

## 2024-02-10 PROCEDURE — 85025 COMPLETE CBC W/AUTO DIFF WBC: CPT | Performed by: FAMILY MEDICINE

## 2024-02-10 PROCEDURE — 36415 COLL VENOUS BLD VENIPUNCTURE: CPT | Performed by: FAMILY MEDICINE

## 2024-02-10 PROCEDURE — 93005 ELECTROCARDIOGRAM TRACING: CPT | Mod: 76 | Performed by: FAMILY MEDICINE

## 2024-02-10 PROCEDURE — 76705 ECHO EXAM OF ABDOMEN: CPT

## 2024-02-10 PROCEDURE — 83690 ASSAY OF LIPASE: CPT | Performed by: FAMILY MEDICINE

## 2024-02-10 PROCEDURE — 80053 COMPREHEN METABOLIC PANEL: CPT | Performed by: FAMILY MEDICINE

## 2024-02-10 PROCEDURE — 85379 FIBRIN DEGRADATION QUANT: CPT | Performed by: FAMILY MEDICINE

## 2024-02-10 PROCEDURE — 82248 BILIRUBIN DIRECT: CPT | Performed by: FAMILY MEDICINE

## 2024-02-10 PROCEDURE — 93010 ELECTROCARDIOGRAM REPORT: CPT | Performed by: FAMILY MEDICINE

## 2024-02-10 PROCEDURE — 93010 ELECTROCARDIOGRAM REPORT: CPT | Mod: 76 | Performed by: FAMILY MEDICINE

## 2024-02-10 PROCEDURE — 71275 CT ANGIOGRAPHY CHEST: CPT | Mod: ME

## 2024-02-10 PROCEDURE — 99284 EMERGENCY DEPT VISIT MOD MDM: CPT | Mod: 25 | Performed by: FAMILY MEDICINE

## 2024-02-10 PROCEDURE — 84484 ASSAY OF TROPONIN QUANT: CPT | Performed by: FAMILY MEDICINE

## 2024-02-10 PROCEDURE — 250N000009 HC RX 250: Performed by: FAMILY MEDICINE

## 2024-02-10 RX ORDER — DIPHENHYDRAMINE HYDROCHLORIDE 50 MG/ML
50 INJECTION INTRAMUSCULAR; INTRAVENOUS ONCE
Status: DISCONTINUED | OUTPATIENT
Start: 2024-02-10 | End: 2024-02-10

## 2024-02-10 RX ORDER — IOPAMIDOL 755 MG/ML
83 INJECTION, SOLUTION INTRAVASCULAR ONCE
Status: COMPLETED | OUTPATIENT
Start: 2024-02-10 | End: 2024-02-10

## 2024-02-10 RX ORDER — METHYLPREDNISOLONE SODIUM SUCCINATE 125 MG/2ML
60 INJECTION, POWDER, LYOPHILIZED, FOR SOLUTION INTRAMUSCULAR; INTRAVENOUS ONCE
Status: DISCONTINUED | OUTPATIENT
Start: 2024-02-10 | End: 2024-02-10

## 2024-02-10 RX ADMIN — SODIUM CHLORIDE 100 ML: 9 INJECTION, SOLUTION INTRAVENOUS at 11:50

## 2024-02-10 RX ADMIN — IOPAMIDOL 83 ML: 755 INJECTION, SOLUTION INTRAVENOUS at 11:50

## 2024-02-10 ASSESSMENT — ENCOUNTER SYMPTOMS
ABDOMINAL PAIN: 0
HEADACHES: 0
CHILLS: 0
WHEEZING: 0
SORE THROAT: 0
DIARRHEA: 0
SINUS PRESSURE: 0
FREQUENCY: 0
VOMITING: 0
SHORTNESS OF BREATH: 1
NAUSEA: 0
PALPITATIONS: 0
COUGH: 0
DYSURIA: 0
DIAPHORESIS: 0
CONSTIPATION: 0
BLOOD IN STOOL: 0
FEVER: 0

## 2024-02-10 ASSESSMENT — ACTIVITIES OF DAILY LIVING (ADL)
ADLS_ACUITY_SCORE: 35

## 2024-02-10 NOTE — DISCHARGE INSTRUCTIONS
ICD-10-CM    1. Chest pain, unspecified type  R07.9     unclear cause.  no serious findings.  stay hydrated and upright 2 hours after a meal.  stay on nexium.  let your cardiologist know about the eval today.  GB is possible and consider meeting with general surgeon.  continue to avoid fatty foods.

## 2024-02-10 NOTE — ED PROVIDER NOTES
History     Chief Complaint   Patient presents with    Chest Pain     HPI  Sharri Elaine is a 73 year old female who presents with a history of hypertension hyperlipidemia coronary disease and status post stenting in the distant past comes in with 3 days of chest pain that has onset in the a.m.'s.  Nausea without vomiting.  Mild dyspnea.  Arrived here tachycardic.   No exertional symptoms possibly worse with lifting.  She has no abdominal pain.  Took nitro with some relief the last couple mornings but this morning she took 3 nitroglycerin and minimal relief took aspirin more than 324 mg this morning.  No other known VTE risk.    Denies recent prolonged travel (>3 hours) by car or plane, history or FHx of venous thromboembolism, recent surgery (last 4 weeks), active cancer history, hypercoagulable state, estrogen or other medications/conditions causing VTE or  new unilateral swelling or pain in the legs or calves.      Allergies:  Allergies   Allergen Reactions    Carvedilol Nausea    Lisinopril Cough    Sulfa Antibiotics Rash       Problem List:    Patient Active Problem List    Diagnosis Date Noted    Health Care Home 06/28/2011     Priority: High     DX V65.8 REPLACED WITH [facility] (04/08/2013)      Chest pain 05/12/2016     Priority: Medium    Acute chest pain 05/12/2016     Priority: Medium    Rosacea 08/20/2010     Priority: Medium    Hypertension goal BP (blood pressure) < 140/90 07/14/2010     Priority: Medium     Goal <130/80      Hyperlipidemia with target LDL less than 100 07/14/2010     Priority: Medium     January 4, 2011 - . I have asked this patient to obtain a fasting lipid profile tomorrow in Ghada Bermeo's office. She is complaining of some muscle weakness and/or achiness, especially in her legs. Perhaps simvastatin at 40 mg will need to be changed to something more potent such as Crestor. If she is having myalgias or myopathy, a change to a water soluble statin would be in  order.   Diagnosis updated by automated process. Provider to review and confirm.      CAD (coronary artery disease)      Priority: Medium     Status post LAD stenting 06/28 for unstable angina. She had in-stent restenosis in the LAD with further stenting on 11/03/2010 when she developed recurrent angina and was hospitalized at [facility]. Coronary angiography demonstrated a 60% circumflex ostial stenosis, a 95% proximal LAD in-stent restenosis along with a 50% ostial stenosis that was unchanged. The right coronary artery had a mild 10%-20% luminal narrowing but nothing significant. Her ejection fraction at that time was 45%. The in-stent restenosis was restented with an [place] drug-eluting stent.   Plavix was changed to Effient and the cardiologist, Dr. Torrez, felt that Effient should be continued at least 2 years or preferentially longer term. Nuclear stress test late summer 2011. We will consider a stress test earlier than that should the patient have symptoms.    Follow up with Cardiology in 6 months       Preinfarction syndrome (H)      Priority: Medium     (Problem list name updated by automated process. Provider to review and confirm.)          Past Medical History:    Past Medical History:   Diagnosis Date    Acne rosacea     [place], unstable 2010    CAD (coronary artery disease) 2010    Fibrocystic breast     Hepatitis     Iritis     Menopause     MI (myocardial infarction) (H)     NONSPECIFIC MEDICAL HISTORY 7/14/09    Shingles 05/2017       Past Surgical History:    Past Surgical History:   Procedure Laterality Date    ANGIOGRAM  6/2010    angiogram  11/3/2010    in stent restenosis    PVD STENTING  2010     x 2, LAD       Family History:    Family History   Problem Relation Age of Onset    Hypertension Mother     Heart Disease Mother         CHF    Alzheimer Disease Father     Hypertension Daughter     High cholesterol Daughter     Breast Cancer No family hx of        Social  History:  Marital Status:   [2]  Social History     Tobacco Use    Smoking status: Never    Smokeless tobacco: Never    Tobacco comments:     never smoker; non-smoking household   Substance Use Topics    Alcohol use: Yes     Comment: 1 glass of wine almost every day    Drug use: No     Comment: never        Medications:    amLODIPine (NORVASC) 10 MG tablet  aspirin 81 MG EC tablet  atorvastatin (LIPITOR) 40 MG tablet  diphenhydrAMINE-acetaminophen (TYLENOL PM)  MG tablet  esomeprazole (NEXIUM) 20 MG DR capsule  losartan (COZAAR) 50 MG tablet  nitroGLYcerin (NITROSTAT) 0.4 MG sublingual tablet  ORDER FOR DME          Review of Systems   Constitutional:  Negative for chills, diaphoresis and fever.   HENT:  Negative for ear pain, sinus pressure and sore throat.    Eyes:  Negative for visual disturbance.   Respiratory:  Positive for shortness of breath. Negative for cough and wheezing.    Cardiovascular:  Positive for chest pain. Negative for palpitations.   Gastrointestinal:  Negative for abdominal pain, blood in stool, constipation, diarrhea, nausea and vomiting.   Genitourinary:  Negative for dysuria, frequency and urgency.   Skin:  Negative for rash.   Neurological:  Negative for headaches.   All other systems reviewed and are negative.      Physical Exam   BP: (!) 162/82  Pulse: 104  Resp: 16  Weight: 89.4 kg (197 lb)  SpO2: 96 %      Physical Exam  Constitutional:       General: She is in acute distress.   Eyes:      Conjunctiva/sclera: Conjunctivae normal.   Cardiovascular:      Rate and Rhythm: Normal rate and regular rhythm.      Heart sounds: No murmur heard.  Pulmonary:      Effort: Pulmonary effort is normal. No respiratory distress.      Breath sounds: Normal breath sounds. No stridor. No wheezing or rhonchi.   Abdominal:      General: There is no distension.      Palpations: There is no mass.      Tenderness: There is abdominal tenderness. There is no guarding.   Musculoskeletal:      Cervical  back: Neck supple.      Right lower leg: No edema.      Left lower leg: No edema.   Skin:     Coloration: Skin is not pale.      Findings: No rash.   Neurological:      General: No focal deficit present.      Mental Status: She is alert.         ED Course                 Procedures                EKG Interpretation:      Interpreted by Tono Cross MD  EKG is in this rhythm at 94 bpm with a left axis.  There is no ST change other than possible ST depression in lead II.  There is a normal R progression no Q waves.  Normal intervals.  Normal conduction.  No ectopy.  Impression sinus rhythm 94 bpm with medium marginal ST depression in lead II.  No other significant changes.           EKG Interpretation:      Interpreted by Tono Cross MD  EKG done at 1211 hrs. demonstrates a sinus rhythm at 90 bpm left axis.  There is no ST change other than the same isolated ST depression upsloping in lead I and lead V5.  No significant contiguous ST change.    There is no T wave changes.  There is poor R progression V1 through V3.  No Q waves.  Normal intervals.  Normal conduction.  No ectopy.  Impression sinus rhythm beats per minute and no change from the first EKG.    Critical Care time:  none               Results for orders placed or performed during the hospital encounter of 02/10/24 (from the past 24 hour(s))   CBC with Platelets & Differential    Narrative    The following orders were created for panel order CBC with Platelets & Differential.  Procedure                               Abnormality         Status                     ---------                               -----------         ------                     CBC with platelets and d...[[id number]]                      Final result                 Please view results for these tests on the individual orders.   Basic metabolic panel   Result Value Ref Range    Sodium 140 135 - 145 mmol/L    Potassium 3.7 3.4 - 5.3 mmol/L    Chloride 102 98 - 107 mmol/L    Carbon  Dioxide (CO2) 25 22 - 29 mmol/L    Anion Gap 13 7 - 15 mmol/L    Urea Nitrogen 15.9 8.0 - 23.0 mg/dL    Creatinine 0.85 0.51 - 0.95 mg/dL    GFR Estimate 71 >60 mL/min/1.73m2    Calcium 9.9 8.8 - 10.2 mg/dL    Glucose 117 (H) 70 - 99 mg/dL   Troponin T, High Sensitivity   Result Value Ref Range    Troponin T, High Sensitivity 12 <=14 ng/L   [place] Draw    Narrative    The following orders were created for panel order [place] Draw.  Procedure                               Abnormality         Status                     ---------                               -----------         ------                     Extra Blue Top Tube[[id number]]                              Final result               Extra Red Top Tube[[id number]]                               Final result                 Please view results for these tests on the individual orders.   CBC with platelets and differential   Result Value Ref Range    WBC Count 7.5 4.0 - 11.0 10e3/uL    RBC Count 4.49 3.80 - 5.20 10e6/uL    Hemoglobin 14.1 11.7 - 15.7 g/dL    Hematocrit 42.6 35.0 - 47.0 %    MCV 95 78 - 100 fL    MCH 31.4 26.5 - 33.0 pg    MCHC 33.1 31.5 - 36.5 g/dL    RDW 13.1 10.0 - 15.0 %    Platelet Count 318 150 - 450 10e3/uL    % Neutrophils 52 %    % Lymphocytes 33 %    % Monocytes 7 %    % Eosinophils 7 %    % Basophils 1 %    % Immature Granulocytes 0 %    NRBCs per 100 WBC 0 <1 /100    Absolute Neutrophils 3.8 1.6 - 8.3 10e3/uL    Absolute Lymphocytes 2.5 0.8 - 5.3 10e3/uL    Absolute Monocytes 0.5 0.0 - 1.3 10e3/uL    Absolute Eosinophils 0.6 0.0 - 0.7 10e3/uL    Absolute Basophils 0.1 0.0 - 0.2 10e3/uL    Absolute Immature Granulocytes 0.0 <=0.4 10e3/uL    Absolute NRBCs 0.0 10e3/uL   Extra Blue Top Tube   Result Value Ref Range    Hold Specimen JIC    Extra Red Top Tube   Result Value Ref Range    Hold Specimen JIC    D dimer quantitative   Result Value Ref Range    D-Dimer Quantitative 0.93 (H) 0.00 - 0.50 ug/mL FEU    Narrative    This D-dimer assay  is intended for use in conjunction with a clinical pretest probability assessment model to exclude pulmonary embolism (PE) and deep venous thrombosis (DVT) in outpatients suspected of PE or DVT. The cut-off value is 0.50 ug/mL FEU.    For patients 50 years of age or older, the application of age-adjusted cut-off values for D-Dimer may increase the specificity without significant effect on sensitivity. The literature suggested calculation age adjusted cut-off in ug/L = age in years x 10 ug/L. The results in this laboratory are reported as ug/mL rather than ug/L. The calculation for age adjusted cut off in ug/mL= age in years x 0.01 ug/mL. For example, the cut off for a 76 year old male is 76 x 0.01 ug/mL = 0.76 ug/mL (760 ug/L).    M Sydni et al. Age adjusted D-dimer cut-off levels to rule out pulmonary embolism: The ADJUST-PE Study. ANNA 2014;311:7685-3665.; HJ Dejuan et al. Diagnostic accuracy of conventional or age adjusted D-dimer cutoff values in older patients with suspected venous thromboembolism. Systemic review and meta-analysis. BMJ 2013:346:f2492.   Hepatic panel   Result Value Ref Range    Protein Total 8.3 6.4 - 8.3 g/dL    Albumin 4.3 3.5 - 5.2 g/dL    Bilirubin Total 0.4 <=1.2 mg/dL    Alkaline Phosphatase 245 (H) 40 - 150 U/L    AST 24 0 - 45 U/L    ALT 24 0 - 50 U/L    Bilirubin Direct <0.20 0.00 - 0.30 mg/dL   Lipase   Result Value Ref Range    Lipase 14 13 - 60 U/L   Chest XR,  PA & LAT    Narrative    EXAM: XR CHEST 2 VIEWS  LOCATION: [facility]  DATE: 2/10/2024    INDICATION: chest pain  COMPARISON: 02/10/2024      Impression    IMPRESSION: No acute cardiopulmonary process. Coronary stent.   CT Chest Pulmonary Embolism w Contrast    Narrative    EXAM: CT CHEST PULMONARY EMBOLISM WITH CONTRAST  LOCATION: [facility]  DATE: 02/10/2024    INDICATION: Chest pain, shortness of breath.  COMPARISON: 02/10/2024 CXR. 10/12/2023  MRI/MRCP.  TECHNIQUE: CT chest pulmonary angiogram during arterial phase injection of IV contrast. Multiplanar reformats and MIP reconstructions were performed. Dose reduction techniques were used.   CONTRAST: 83 mL Isovue 370.    FINDINGS:  ANGIOGRAM CHEST: Pulmonary arteries are normal caliber and negative for pulmonary emboli. Thoracic aorta is negative for dissection. No CT evidence of right heart strain.    LUNGS AND PLEURA: Mild bronchial wall thickening could indicate bronchial inflammation. Mosaic attenuation throughout both lungs consistent with multifocal air trapping. Multiple small foci of subsegmental atelectasis in the periphery of the mid and   lower lungs.    MEDIASTINUM/AXILLAE: Heart size within normal limits. No pericardial effusion. No lymphadenopathy.    CORONARY ARTERY CALCIFICATION: LAD coronary artery stent.    UPPER ABDOMEN: Gallbladder is mildly distended and contains multiple stones and there is mild intrahepatic and extrahepatic bile duct dilatation, all unchanged. Mild hepatomegaly and moderate diffuse hepatic steatosis.    MUSCULOSKELETAL: No fracture or bone lesion.      Impression    IMPRESSION:  1.  No pulmonary emboli. No acute aortic syndrome.  2.  CT signs suggestive of bronchial inflammation, multifocal air trapping and small foci of subsegmental atelectasis in the mid and lower lungs.  3.  Gallbladder is mildly distended and contains multiple stones and there is mild intrahepatic and extrahepatic bile duct dilatation, all unchanged.   4.  Mild hepatomegaly and moderate diffuse hepatic steatosis.     Troponin T, High Sensitivity   Result Value Ref Range    Troponin T, High Sensitivity 9 <=14 ng/L       Medications   iopamidol (ISOVUE-370) solution 83 mL (83 mLs Intravenous $Given 2/10/24 1150)   sodium chloride 0.9 % bag 500mL for CT scan flush use (100 mLs As instructed $Given 2/10/24 1150)       Assessments & Plan (with Medical Decision Making)     KEATON; Sharri Elaine is a 73  year old female presents with anterior chest pain and sense of dyspnea.   She has a history of known coronary disease and stenting in the distant past.  She presented with Nonexertional symptoms but she did have chest pain    That had been at least partially relieved with nitroglycerin  She had taken aspirin prior to arrival.  She had similar symptoms each of the last 3 days.  Her EKG demonstrated a very isolated ST change in a couple of the leads but these were noncontiguous and that was an atypical configuration of the ST and QRS that likely was her baseline compared to prior EKG.  Her troponins were serially negative.  She did have an initial sinus tach likely related to her anxiety she noted however with chest pain possible shortness of breath and tachycardia despite a history of no obvious VTE risk she had a D-dimer that approached 1.0.  This is roughly the cutoff of her age 73 which is the age that she is.  I recommended given the tachycardia of pursuing the CT chest PE protocol which was overall reassuring.  However she also has an elevated alkaline phosphatase and the pain could have been due to the gallbladder which has previously demonstrated stones and gallbladder dilatation past.  I finished the encounter by discussing with the patient the option for obtaining ultrasound to confirm no signs of cholecystitis and she agreed.  This was performed and there are no findings of cholecystitis.  Will plan for discharge home with precautions as below and also recommendations to let her cardiology group know she was in.  Cannot 100% exclude cardiac cause and she certainly has risk but she appears to be risk stratified for discharge home.    I have reviewed the nursing notes.    I have reviewed the findings, diagnosis, plan and need for follow up with the patient.           Medical Decision Making  The patient's presentation was of moderate complexity (an undiagnosed new problem with uncertain diagnosis).    The  patient's evaluation involved:  ordering and/or review of 3+ test(s) in this encounter (see separate area of note for details)    The patient's management necessitated moderate risk (prescription drug management including medications given in the ED).        New Prescriptions    No medications on file       Final diagnoses:   Chest pain, unspecified type - unclear cause.  no serious findings.  stay hydrated and upright 2 hours after a meal.  stay on nexium.  let your cardiologist know about the eval today.  GB is possible and consider meeting with general surgeon.  continue to avoid fatty foods.       2/10/2024   [facility] EMERGENCY DEPT       Tono Cross MD  02/10/24 1155

## 2024-02-10 NOTE — ED NOTES
Pt presents to ED with concerns of chest pain. Pt has had previous stents placed. Currently CP 5/10. Woke up with pain 3 days in a row now, last 2 days the pain cleared on it's own, today it didn't go away. CP feels like a heaviness. Tried 2x nitroglycerin, 2x tums and 2x excedrine extra strength without improvement.

## 2024-02-10 NOTE — ED TRIAGE NOTES
C/O chest pain that started at around 0700 when woke up, had similar the last 2 mornings but it went away, today is not going away, history of 3 stints     Triage Assessment (Adult)       Row Name 02/10/24 0941          Triage Assessment    Airway WDL WDL        Respiratory WDL    Respiratory WDL WDL        Cardiac WDL    Cardiac WDL chest pain        Chest Pain Assessment    Chest Pain Location anterior chest, left     Chest Pain Radiation jaw     Character tightness;other (see comments)  heavy     Precipitating Factors at rest        Peripheral/Neurovascular WDL    Peripheral Neurovascular WDL WDL        Cognitive/Neuro/Behavioral WDL    Cognitive/Neuro/Behavioral WDL WDL

## 2024-03-26 DIAGNOSIS — I10 HYPERTENSION GOAL BP (BLOOD PRESSURE) < 140/90: ICD-10-CM

## 2024-03-26 DIAGNOSIS — E78.5 HYPERLIPIDEMIA WITH TARGET LDL LESS THAN 100: ICD-10-CM

## 2024-03-29 NOTE — TELEPHONE ENCOUNTER
Name from pharmacy: ATORVASTATIN TABS 40MG         Will file in chart as: atorvastatin (LIPITOR) 40 MG tablet    Sig: TAKE 1 TABLET DAILY    Disp: 90 tablet    Refills: 3    Start: 3/26/2024    Class: E-Prescribe    Non-formulary For: Hypertension goal BP (blood pressure) < 140/90, Hyperlipidemia with target LDL less than 100    Last ordered: 2 months ago (12/31/2023) by LUCA Gillespie MD    Last refill: 1/2/2024    Rx #: [id number]    Antihyperlipidemic agents [id number]/29/2024 12:15 PM   Protocol Details LDL on file in the past 12 months    Medication is active on med list    Recent (12 mo) or future (90 days) visit within the authorizing provider's specialty    Patient is age 18 years or older    No active pregnancy on record    No positive pregnancy test in past 12 mos      To be filled at: [facility] [address]     Patient saw Dr. Torres on 7/27/23. Patient was to follow up in 1 year. Patient is due for fasting labs. Order is already in place. Patient already had maria luisa refill. Plan to offer lab appointment for further refills.    Bianka Kapoor RN, BSN  Cardiology RN Care Coordinator   [place]   Phone: [phone]  Fax: [phone] ([place]) [phone] ([place])

## 2024-03-29 NOTE — TELEPHONE ENCOUNTER
ATORVASTATIN TABS 40MG       Last Written Prescription Date:  12-31-23  Last Fill Quantity: 90,   # refills: 0  Last Office Visit : 7-27-23  Future Office visit:  none    Routing refill request to provider for review/approval because:  Overdue lab: LDL,   Previous maria luisa rf

## 2024-04-01 NOTE — TELEPHONE ENCOUNTER
Called and spoke with patient. Patient stated she somehow has enough Atorvastatin leftover that she does not need a refill at this time. She believes that she has extra refills that were sitting around and not used yet. Writer asked patient if we could still schedule fasting lab work to ensure medication does not need to be adjusted. Patient agreeable and scheduled for fasting labs 4/11 in [place]. Patient confirmed appointment date, time, and location.     GLORIA Parra

## 2024-04-02 RX ORDER — ATORVASTATIN CALCIUM 40 MG/1
40 TABLET, FILM COATED ORAL DAILY
Qty: 90 TABLET | Refills: 3 | Status: SHIPPED | OUTPATIENT
Start: 2024-04-02

## 2024-04-11 ENCOUNTER — LAB (OUTPATIENT)
Dept: LAB | Facility: CLINIC | Age: 74
End: 2024-04-11
Payer: COMMERCIAL

## 2024-04-11 DIAGNOSIS — I10 HYPERTENSION GOAL BP (BLOOD PRESSURE) < 140/90: ICD-10-CM

## 2024-04-11 DIAGNOSIS — E78.5 HYPERLIPIDEMIA WITH TARGET LDL LESS THAN 100: ICD-10-CM

## 2024-04-11 LAB
CHOLEST SERPL-MCNC: 173 MG/DL
FASTING STATUS PATIENT QL REPORTED: YES
HDLC SERPL-MCNC: 62 MG/DL
LDLC SERPL CALC-MCNC: 93 MG/DL
NONHDLC SERPL-MCNC: 111 MG/DL
TRIGL SERPL-MCNC: 90 MG/DL

## 2024-04-11 PROCEDURE — 80061 LIPID PANEL: CPT

## 2024-04-11 PROCEDURE — 36415 COLL VENOUS BLD VENIPUNCTURE: CPT

## 2024-05-20 ENCOUNTER — OFFICE VISIT (OUTPATIENT)
Dept: FAMILY MEDICINE | Facility: CLINIC | Age: 74
End: 2024-05-20
Payer: COMMERCIAL

## 2024-05-20 VITALS
HEART RATE: 77 BPM | RESPIRATION RATE: 12 BRPM | SYSTOLIC BLOOD PRESSURE: 138 MMHG | HEIGHT: 63 IN | BODY MASS INDEX: 35.95 KG/M2 | OXYGEN SATURATION: 96 % | TEMPERATURE: 97.4 F | DIASTOLIC BLOOD PRESSURE: 60 MMHG | WEIGHT: 202.9 LBS

## 2024-05-20 DIAGNOSIS — K80.20 GALL STONES: Primary | ICD-10-CM

## 2024-05-20 DIAGNOSIS — E66.812 CLASS 2 SEVERE OBESITY WITH BODY MASS INDEX (BMI) OF 35 TO 39.9 WITH SERIOUS COMORBIDITY (H): ICD-10-CM

## 2024-05-20 DIAGNOSIS — E66.01 CLASS 2 SEVERE OBESITY WITH BODY MASS INDEX (BMI) OF 35 TO 39.9 WITH SERIOUS COMORBIDITY (H): ICD-10-CM

## 2024-05-20 PROCEDURE — 99213 OFFICE O/P EST LOW 20 MIN: CPT | Performed by: FAMILY MEDICINE

## 2024-05-20 ASSESSMENT — PAIN SCALES - GENERAL: PAINLEVEL: MILD PAIN (2)

## 2024-05-20 NOTE — PROGRESS NOTES
"  Assessment & Plan   1. Gall stones  Patient is a 73 yr old female here for Right upper quadrant pain and findings of gall stones   Still having pain - general surgery referral placed.  - Adult General Surg Referral; Future    2. Class 2 severe obesity with body mass index (BMI) of 35 to 39.9 with serious comorbidity (H)  Ongoing        BMI  Estimated body mass index is 35.94 kg/m  as calculated from the following:    Height as of this encounter: 1.6 m (5' 3\").    Weight as of this encounter: 92 kg (202 lb 14.4 oz).         FUTURE APPOINTMENTS:       - Follow-up visit as needed.    Subjective   Sharri is a 73 year old, presenting for the following health issues:    73 yr old female here for right upper quadrant pain. This has been ongoing for three months. She states that she was seen in the ED and was told that she had gall stones.   Patient reports that she continues to have a dull continuous pain in the right upper quadrant. She will like to see a general surgeon for consideration for gall bladder surgery.    Abdominal Pain (Patient was seen in ED in February, diagnose with gallstones, has some discomfort, nausea occasionally, needing referral to surgeon./)        5/20/2024     8:29 AM   Additional Questions   Roomed by Josefa RAMIREZ   Accompanied by self     History of Present Illness       Reason for visit:  Gall stones    She eats 2-3 servings of fruits and vegetables daily.She consumes 1 sweetened beverage(s) daily.She exercises with enough effort to increase her heart rate 9 or less minutes per day.  She exercises with enough effort to increase her heart rate 3 or less days per week.   She is taking medications regularly.         Pain History:  When did you first notice your pain? February 2024, was seen in the ED   Have you seen anyone else for your pain? Yes - in the ED  How has your pain affected your ability to work? Not applicable  Where in your body do you have pain? Abdominal/Flank Pain    Chief Complaint " "  Patient presents with    Abdominal Pain     Patient was seen in ED in February, diagnose with gallstones, has some discomfort, nausea occasionally, needing referral to surgeon.         Onset/Duration: Since February  Description:   Character: discomfort  Location: right upper quadrant  Radiation: None  Intensity: 2/10  Progression of Symptoms:  same  Accompanying Signs & Symptoms:  Fever/Chills: No  Gas/Bloating: YES- gas  Nausea: YES  Vomitting: No  Diarrhea: No  Constipation: No  Dysuria or Hematuria: No  History:   Trauma: No  Previous similar pain: YES  Previous tests done: US 2/10/24  Precipitating factors:   Does the pain change with:     Food: No    Bowel Movement: YES- little better    Urination: No   Other factors:  No  Therapies tried and outcome: None  No LMP recorded. Patient is postmenopausal.            Review of Systems  CONSTITUTIONAL: NEGATIVE for fever, chills, change in weight  INTEGUMENTARY/SKIN: NEGATIVE for worrisome rashes, moles or lesions  ENT/MOUTH: NEGATIVE for ear, mouth and throat problems  RESP: NEGATIVE for significant cough or SOB  CV: NEGATIVE for chest pain, palpitations or peripheral edema  GI: POSITIVE for abdominal pain RUQ and abdominal pain RUQ  : negative for, dysuria, frequency , and urgency  MUSCULOSKELETAL: NEGATIVE for significant arthralgias or myalgia  NEURO: NEGATIVE for weakness, dizziness or paresthesias  ENDOCRINE: NEGATIVE for temperature intolerance, skin/hair changes  HEME/ALLERGY/IMMUNE: NEGATIVE for bleeding problems  PSYCHIATRIC: NEGATIVE for changes in mood or affect      Objective    /60 (BP Location: Left arm, Patient Position: Chair, Cuff Size: Adult Large)   Pulse 77   Temp 97.4  F (36.3  C) (Tympanic)   Resp 12   Ht 1.6 m (5' 3\")   Wt 92 kg (202 lb 14.4 oz)   SpO2 96%   BMI 35.94 kg/m    Body mass index is 35.94 kg/m .  Physical Exam   GENERAL: alert and no distress  EYES: Eyes grossly normal to inspection, PERRL and conjunctivae and " sclerae normal  HENT: ear canals and TM's normal, nose and mouth without ulcers or lesions  NECK: no adenopathy, no asymmetry, masses, or scars  RESP: lungs clear to auscultation - no rales, rhonchi or wheezes  CV: regular rate and rhythm, normal S1 S2, no S3 or S4, no murmur, click or rub, no peripheral edema  ABDOMEN: tenderness RUQ and bowel sounds normal  MS: no gross musculoskeletal defects noted, no edema            Signed Electronically by: Demi Martínez MD

## 2024-05-29 ENCOUNTER — TELEPHONE (OUTPATIENT)
Dept: SURGERY | Facility: CLINIC | Age: 74
End: 2024-05-29

## 2024-05-29 ENCOUNTER — OFFICE VISIT (OUTPATIENT)
Dept: SURGERY | Facility: CLINIC | Age: 74
End: 2024-05-29
Attending: FAMILY MEDICINE
Payer: COMMERCIAL

## 2024-05-29 VITALS
HEART RATE: 85 BPM | SYSTOLIC BLOOD PRESSURE: 147 MMHG | TEMPERATURE: 98.2 F | BODY MASS INDEX: 35.94 KG/M2 | HEIGHT: 63 IN | WEIGHT: 202.82 LBS | DIASTOLIC BLOOD PRESSURE: 81 MMHG

## 2024-05-29 DIAGNOSIS — I25.10 CORONARY ARTERY DISEASE INVOLVING NATIVE CORONARY ARTERY OF NATIVE HEART WITHOUT ANGINA PECTORIS: ICD-10-CM

## 2024-05-29 DIAGNOSIS — K80.20 GALL STONES: Primary | ICD-10-CM

## 2024-05-29 DIAGNOSIS — Z95.5 HISTORY OF HEART ARTERY STENT: ICD-10-CM

## 2024-05-29 DIAGNOSIS — E66.812 CLASS 2 SEVERE OBESITY WITH BODY MASS INDEX (BMI) OF 35 TO 39.9 WITH SERIOUS COMORBIDITY (H): ICD-10-CM

## 2024-05-29 DIAGNOSIS — I10 HYPERTENSION GOAL BP (BLOOD PRESSURE) < 140/90: ICD-10-CM

## 2024-05-29 DIAGNOSIS — E66.01 CLASS 2 SEVERE OBESITY WITH BODY MASS INDEX (BMI) OF 35 TO 39.9 WITH SERIOUS COMORBIDITY (H): ICD-10-CM

## 2024-05-29 PROCEDURE — 99204 OFFICE O/P NEW MOD 45 MIN: CPT | Performed by: SURGERY

## 2024-05-29 ASSESSMENT — PAIN SCALES - GENERAL: PAINLEVEL: MILD PAIN (2)

## 2024-05-29 NOTE — NURSING NOTE
"Initial BP (!) 147/81 (BP Location: Right arm, Patient Position: Sitting, Cuff Size: Adult Regular)   Pulse 85   Temp 98.2  F (36.8  C) (Tympanic)   Ht 1.6 m (5' 2.99\")   Wt 92 kg (202 lb 13.2 oz)   BMI 35.94 kg/m   Estimated body mass index is 35.94 kg/m  as calculated from the following:    Height as of this encounter: 1.6 m (5' 2.99\").    Weight as of this encounter: 92 kg (202 lb 13.2 oz). .  Hoa Villalpando MA    "

## 2024-05-29 NOTE — LETTER
5/29/2024      Sharri Elaine  [id number] [facility] [id number]      Dear Colleague,    Thank you for referring your patient, Sharri Elaine, to the [facility]. Please see a copy of my visit note below.      Assessment & Plan  Problem List Items Addressed This Visit          Digestive    Class 2 severe obesity with body mass index (BMI) of 35 to 39.9 with serious comorbidity (H)       Circulatory    Hypertension goal BP (blood pressure) < 140/90    CAD (coronary artery disease)     Other Visit Diagnoses       Gall stones    -  Primary    Relevant Orders    Case Request: CHOLECYSTECTOMY, ROBOT-ASSISTED, LAPAROSCOPIC, USING DA MELY XI (Completed)    History of heart artery stent               72 yo F with chronic calculus cholecystitis with hx of choledocholithiasis what spontaneously passed.     The patient was thoroughly counseled regarding their Gall stones [K80.20].     The patient was informed that the proposed procedure or medical intervention involves removal of the gallbladder laparoscopically or robotically (with small incisions and camera) possibly open and does offer a very good likelihood of symptom relief.  I recommend robotic cholecystectomy    The patient was made aware of the risks of the procedure, including but not limited to:    bleeding, conversion to open, bile leak, bile duct injury or other adjacent organ injury, retained gallstones, cardiac or pulmonary related complications and anesthesia complications. Also that difficulties may be encountered during recovery to include: wound or deep intraabdominal infection, wound dehiscence, incisional hernia, bile leak or retained stone requiring ERCP.     In the course of the evaluation we did discuss other therapeutic options with the patient, including antibiotics and/or drainage. The risks and benefits of these options were also discussed which include but are not limited to: recurrent worsening episodes.     Also  "discussed were possible problems or difficulties the patient may encounter if treatment was not pursued at this time. These include: worsening episodes, cholangitis and/or pancreatitis.     The patient was informed that [facility], DO will be primarily responsible for the procedure. Assistance during the procedure and during hospitalization may also be provided by other physicians, nurses and technicians.     The patient was also informed that if exposure to the patient s blood or body fluids occurs during the procedure, HIV testing of the patient will occur unless they refuse at this time. Risk of blood transfusion is minimal.     The patient will be provided additional education resources by the support staff. If there are ever any questions regarding their diagnosis or the procedure, the patient is encouraged to ask.     All of the patient s or their legal representative s questions have been answered to their satisfaction and they have indicated a clear understanding of this discussion.   Sharri expressed understanding of risks, benefits and alternatives and wished to proceed.     All findings, test results, and diagnosis were discussed with the patient. Sharri  participated in the decision making process and agreed with the plan of care. Questions were sought and answered.     Face to Face/patient Contact total time: 25 minutes  Pre Charting time: 10 minutes; Post charting time, communication and other activities: 10 minutes;   Total time:  45 minutes    BMI  Estimated body mass index is 35.94 kg/m  as calculated from the following:    Height as of this encounter: 1.6 m (5' 2.99\").    Weight as of this encounter: 92 kg (202 lb 13.2 oz).       No follow-ups on file.      Subjective  Sharri is a 73 year old, presenting for the following health issues:  Consult (Gallstones)    Saw gallstones in feb 2024; been struggling with gallstones for many years ago  Thought it was cardiac related  Always feels like a " "pressure in the epigastrium; bloated, nausea  Symptoms usually first thing in the AM  Doesn't matter what she eats; pain is there all the time  Hx of choledocholithiasis that passed on its own  Pt feels fatigue a lot lately  Hx of MI with 3 stents; takes daily baby ASA  Never had abdominal surgery  Never been a smoker.            Review of Systems  Constitutional, neuro, ENT, endocrine, pulmonary, cardiac, gastrointestinal, genitourinary, musculoskeletal, integument and psychiatric systems are negative, except as otherwise noted.      Objective   BP (!) 147/81 (BP Location: Right arm, Patient Position: Sitting, Cuff Size: Adult Regular)   Pulse 85   Temp 98.2  F (36.8  C) (Tympanic)   Ht 1.6 m (5' 2.99\")   Wt 92 kg (202 lb 13.2 oz)   BMI 35.94 kg/m    Body mass index is 35.94 kg/m .  Physical Exam  Vitals reviewed.   Eyes:      Conjunctiva/sclera: Conjunctivae normal.   Cardiovascular:      Pulses: Normal pulses.   Pulmonary:      Effort: Pulmonary effort is normal.   Abdominal:      General: There is no distension.      Palpations: Abdomen is soft.      Tenderness: There is abdominal tenderness. There is no guarding or rebound.   Musculoskeletal:         General: Normal range of motion.   Skin:     General: Skin is warm.      Capillary Refill: Capillary refill takes less than 2 seconds.   Neurological:      General: No focal deficit present.        Narrative & Impression   EXAM: US ABDOMEN LIMITED  LOCATION: [facility]  DATE: 2/10/2024     INDICATION: gall bladder distention, alk phos elevation, pain.  COMPARISON: CT earlier today, MRCP 10/12/2023  TECHNIQUE: Limited abdominal ultrasound.     FINDINGS:     GALLBLADDER: Well-distended with multiple dependent gallstones. No inflammatory changes.     BILE DUCTS: No biliary dilatation. The common duct measures 10 mm.     LIVER: Diffuse increased density. No focal mass.     RIGHT KIDNEY: No hydronephrosis.     PANCREAS: The visualized " portions are normal.     No ascites.                                                                      IMPRESSION:  1.  Cholelithiasis without evidence of cholecystitis.  2.  CBD is 10 mm and unchanged  from before. No intrahepatic biliary dilatation.  3.  Hepatic steatosis.     EXAM: CT CHEST PULMONARY EMBOLISM WITH CONTRAST  LOCATION: [facility]  DATE: 02/10/2024     INDICATION: Chest pain, shortness of breath.  COMPARISON: 02/10/2024 CXR. 10/12/2023 MRI/MRCP.  TECHNIQUE: CT chest pulmonary angiogram during arterial phase injection of IV contrast. Multiplanar reformats and MIP reconstructions were performed. Dose reduction techniques were used.   CONTRAST: 83 mL Isovue 370.     FINDINGS:  ANGIOGRAM CHEST: Pulmonary arteries are normal caliber and negative for pulmonary emboli. Thoracic aorta is negative for dissection. No CT evidence of right heart strain.     LUNGS AND PLEURA: Mild bronchial wall thickening could indicate bronchial inflammation. Mosaic attenuation throughout both lungs consistent with multifocal air trapping. Multiple small foci of subsegmental atelectasis in the periphery of the mid and   lower lungs.     MEDIASTINUM/AXILLAE: Heart size within normal limits. No pericardial effusion. No lymphadenopathy.     CORONARY ARTERY CALCIFICATION: LAD coronary artery stent.     UPPER ABDOMEN: Gallbladder is mildly distended and contains multiple stones and there is mild intrahepatic and extrahepatic bile duct dilatation, all unchanged. Mild hepatomegaly and moderate diffuse hepatic steatosis.     MUSCULOSKELETAL: No fracture or bone lesion.                                                                      IMPRESSION:  1.  No pulmonary emboli. No acute aortic syndrome.  2.  CT signs suggestive of bronchial inflammation, multifocal air trapping and small foci of subsegmental atelectasis in the mid and lower lungs.  3.  Gallbladder is mildly distended and contains multiple  stones and there is mild intrahepatic and extrahepatic bile duct dilatation, all unchanged.   4.  Mild hepatomegaly and moderate diffuse hepatic steatosis.        Signed Electronically by: Christopher Varela MD        Again, thank you for allowing me to participate in the care of your patient.        Sincerely,        Christopher Varela MD

## 2024-05-29 NOTE — PROGRESS NOTES
Assessment & Plan   Problem List Items Addressed This Visit          Digestive    Class 2 severe obesity with body mass index (BMI) of 35 to 39.9 with serious comorbidity (H)       Circulatory    Hypertension goal BP (blood pressure) < 140/90    CAD (coronary artery disease)     Other Visit Diagnoses       Gall stones    -  Primary    Relevant Orders    Case Request: CHOLECYSTECTOMY, ROBOT-ASSISTED, LAPAROSCOPIC, USING DA MELY XI (Completed)    History of heart artery stent               74 yo F with chronic calculus cholecystitis with hx of choledocholithiasis what spontaneously passed.     The patient was thoroughly counseled regarding their Gall stones [K80.20].     The patient was informed that the proposed procedure or medical intervention involves removal of the gallbladder laparoscopically or robotically (with small incisions and camera) possibly open and does offer a very good likelihood of symptom relief.  I recommend robotic cholecystectomy    The patient was made aware of the risks of the procedure, including but not limited to:    bleeding, conversion to open, bile leak, bile duct injury or other adjacent organ injury, retained gallstones, cardiac or pulmonary related complications and anesthesia complications. Also that difficulties may be encountered during recovery to include: wound or deep intraabdominal infection, wound dehiscence, incisional hernia, bile leak or retained stone requiring ERCP.     In the course of the evaluation we did discuss other therapeutic options with the patient, including antibiotics and/or drainage. The risks and benefits of these options were also discussed which include but are not limited to: recurrent worsening episodes.     Also discussed were possible problems or difficulties the patient may encounter if treatment was not pursued at this time. These include: worsening episodes, cholangitis and/or pancreatitis.     The patient was informed that [facility], DO will  "be primarily responsible for the procedure. Assistance during the procedure and during hospitalization may also be provided by other physicians, nurses and technicians.     The patient was also informed that if exposure to the patient s blood or body fluids occurs during the procedure, HIV testing of the patient will occur unless they refuse at this time. Risk of blood transfusion is minimal.     The patient will be provided additional education resources by the support staff. If there are ever any questions regarding their diagnosis or the procedure, the patient is encouraged to ask.     All of the patient s or their legal representative s questions have been answered to their satisfaction and they have indicated a clear understanding of this discussion.   Sharri expressed understanding of risks, benefits and alternatives and wished to proceed.     All findings, test results, and diagnosis were discussed with the patient. Sharri  participated in the decision making process and agreed with the plan of care. Questions were sought and answered.     Face to Face/patient Contact total time: 25 minutes  Pre Charting time: 10 minutes; Post charting time, communication and other activities: 10 minutes;   Total time:  45 minutes    BMI  Estimated body mass index is 35.94 kg/m  as calculated from the following:    Height as of this encounter: 1.6 m (5' 2.99\").    Weight as of this encounter: 92 kg (202 lb 13.2 oz).       No follow-ups on file.      Subjective   Sharri is a 73 year old, presenting for the following health issues:  Consult (Gallstones)    Saw gallstones in feb 2024; been struggling with gallstones for many years ago  Thought it was cardiac related  Always feels like a pressure in the epigastrium; bloated, nausea  Symptoms usually first thing in the AM  Doesn't matter what she eats; pain is there all the time  Hx of choledocholithiasis that passed on its own  Pt feels fatigue a lot lately  Hx of MI with 3 " "stents; takes daily baby ASA  Never had abdominal surgery  Never been a smoker.            Review of Systems  Constitutional, neuro, ENT, endocrine, pulmonary, cardiac, gastrointestinal, genitourinary, musculoskeletal, integument and psychiatric systems are negative, except as otherwise noted.      Objective    BP (!) 147/81 (BP Location: Right arm, Patient Position: Sitting, Cuff Size: Adult Regular)   Pulse 85   Temp 98.2  F (36.8  C) (Tympanic)   Ht 1.6 m (5' 2.99\")   Wt 92 kg (202 lb 13.2 oz)   BMI 35.94 kg/m    Body mass index is 35.94 kg/m .  Physical Exam  Vitals reviewed.   Eyes:      Conjunctiva/sclera: Conjunctivae normal.   Cardiovascular:      Pulses: Normal pulses.   Pulmonary:      Effort: Pulmonary effort is normal.   Abdominal:      General: There is no distension.      Palpations: Abdomen is soft.      Tenderness: There is abdominal tenderness. There is no guarding or rebound.   Musculoskeletal:         General: Normal range of motion.   Skin:     General: Skin is warm.      Capillary Refill: Capillary refill takes less than 2 seconds.   Neurological:      General: No focal deficit present.        Narrative & Impression   EXAM: US ABDOMEN LIMITED  LOCATION: [facility]  DATE: 2/10/2024     INDICATION: gall bladder distention, alk phos elevation, pain.  COMPARISON: CT earlier today, MRCP 10/12/2023  TECHNIQUE: Limited abdominal ultrasound.     FINDINGS:     GALLBLADDER: Well-distended with multiple dependent gallstones. No inflammatory changes.     BILE DUCTS: No biliary dilatation. The common duct measures 10 mm.     LIVER: Diffuse increased density. No focal mass.     RIGHT KIDNEY: No hydronephrosis.     PANCREAS: The visualized portions are normal.     No ascites.                                                                      IMPRESSION:  1.  Cholelithiasis without evidence of cholecystitis.  2.  CBD is 10 mm and unchanged  from before. No intrahepatic biliary " dilatation.  3.  Hepatic steatosis.     EXAM: CT CHEST PULMONARY EMBOLISM WITH CONTRAST  LOCATION: [facility]  DATE: 02/10/2024     INDICATION: Chest pain, shortness of breath.  COMPARISON: 02/10/2024 CXR. 10/12/2023 MRI/MRCP.  TECHNIQUE: CT chest pulmonary angiogram during arterial phase injection of IV contrast. Multiplanar reformats and MIP reconstructions were performed. Dose reduction techniques were used.   CONTRAST: 83 mL Isovue 370.     FINDINGS:  ANGIOGRAM CHEST: Pulmonary arteries are normal caliber and negative for pulmonary emboli. Thoracic aorta is negative for dissection. No CT evidence of right heart strain.     LUNGS AND PLEURA: Mild bronchial wall thickening could indicate bronchial inflammation. Mosaic attenuation throughout both lungs consistent with multifocal air trapping. Multiple small foci of subsegmental atelectasis in the periphery of the mid and   lower lungs.     MEDIASTINUM/AXILLAE: Heart size within normal limits. No pericardial effusion. No lymphadenopathy.     CORONARY ARTERY CALCIFICATION: LAD coronary artery stent.     UPPER ABDOMEN: Gallbladder is mildly distended and contains multiple stones and there is mild intrahepatic and extrahepatic bile duct dilatation, all unchanged. Mild hepatomegaly and moderate diffuse hepatic steatosis.     MUSCULOSKELETAL: No fracture or bone lesion.                                                                      IMPRESSION:  1.  No pulmonary emboli. No acute aortic syndrome.  2.  CT signs suggestive of bronchial inflammation, multifocal air trapping and small foci of subsegmental atelectasis in the mid and lower lungs.  3.  Gallbladder is mildly distended and contains multiple stones and there is mild intrahepatic and extrahepatic bile duct dilatation, all unchanged.   4.  Mild hepatomegaly and moderate diffuse hepatic steatosis.        Signed Electronically by: Christopher Varela MD

## 2024-05-30 NOTE — TELEPHONE ENCOUNTER
Patient returned call she Is available today until 11 am otherwise try calling another day. Thank You!

## 2024-05-30 NOTE — TELEPHONE ENCOUNTER
Type of surgery: CHOLECYSTECTOMY, ROBOT-ASSISTED, LAPAROSCOPIC, USING DA MELY XI   Location of surgery: [place] [facility]  Date and time of surgery: 6/27  Surgeon: Nichole  Pre-Op Appt Date: 6/14  Post-Op Appt Date: 7/9   Packet sent out: Yes  Pre-cert/Authorization completed:  Not Applicable  Date: na

## 2024-06-10 DIAGNOSIS — I10 HYPERTENSION GOAL BP (BLOOD PRESSURE) < 140/90: ICD-10-CM

## 2024-06-10 DIAGNOSIS — Z98.61 STATUS POST PERCUTANEOUS TRANSLUMINAL CORONARY ANGIOPLASTY: ICD-10-CM

## 2024-06-10 DIAGNOSIS — E78.5 HYPERLIPIDEMIA WITH TARGET LDL LESS THAN 100: ICD-10-CM

## 2024-06-14 ENCOUNTER — OFFICE VISIT (OUTPATIENT)
Dept: FAMILY MEDICINE | Facility: CLINIC | Age: 74
End: 2024-06-14
Payer: COMMERCIAL

## 2024-06-14 VITALS
TEMPERATURE: 97.8 F | WEIGHT: 201 LBS | DIASTOLIC BLOOD PRESSURE: 70 MMHG | RESPIRATION RATE: 16 BRPM | BODY MASS INDEX: 35.61 KG/M2 | OXYGEN SATURATION: 98 % | HEIGHT: 63 IN | HEART RATE: 77 BPM | SYSTOLIC BLOOD PRESSURE: 134 MMHG

## 2024-06-14 DIAGNOSIS — Z01.818 PREOP GENERAL PHYSICAL EXAM: Primary | ICD-10-CM

## 2024-06-14 DIAGNOSIS — K80.20 GALL BLADDER STONES: ICD-10-CM

## 2024-06-14 DIAGNOSIS — I10 HYPERTENSION GOAL BP (BLOOD PRESSURE) < 140/90: ICD-10-CM

## 2024-06-14 DIAGNOSIS — E78.5 HYPERLIPIDEMIA WITH TARGET LDL LESS THAN 100: ICD-10-CM

## 2024-06-14 LAB
ANION GAP SERPL CALCULATED.3IONS-SCNC: 10 MMOL/L (ref 7–15)
BUN SERPL-MCNC: 20 MG/DL (ref 8–23)
CALCIUM SERPL-MCNC: 9.6 MG/DL (ref 8.8–10.2)
CHLORIDE SERPL-SCNC: 105 MMOL/L (ref 98–107)
CREAT SERPL-MCNC: 0.95 MG/DL (ref 0.51–0.95)
DEPRECATED HCO3 PLAS-SCNC: 26 MMOL/L (ref 22–29)
EGFRCR SERPLBLD CKD-EPI 2021: 62 ML/MIN/1.73M2
GLUCOSE SERPL-MCNC: 91 MG/DL (ref 70–99)
POTASSIUM SERPL-SCNC: 5 MMOL/L (ref 3.4–5.3)
SODIUM SERPL-SCNC: 141 MMOL/L (ref 135–145)

## 2024-06-14 PROCEDURE — 36415 COLL VENOUS BLD VENIPUNCTURE: CPT | Performed by: FAMILY MEDICINE

## 2024-06-14 PROCEDURE — 99214 OFFICE O/P EST MOD 30 MIN: CPT | Performed by: FAMILY MEDICINE

## 2024-06-14 PROCEDURE — 80048 BASIC METABOLIC PNL TOTAL CA: CPT | Performed by: FAMILY MEDICINE

## 2024-06-14 ASSESSMENT — PAIN SCALES - GENERAL: PAINLEVEL: MILD PAIN (2)

## 2024-06-14 NOTE — PROGRESS NOTES
Preoperative Evaluation  [facility]  [address] [address]  Phone: [phone]  Primary Provider: Demi Martínez MD  Pre-op Performing Provider: Demi Martínez MD  Jun 14, 2024 6/9/2024   Surgical Information   What procedure is being done? Gallbladder removed-CHOLECYSTECTOMY, ROBOT-ASSISTED, LAPAROSCOPIC, USING DA MELY XI    Facility or Hospital where procedure/surgery will be performed: [facility]   Who is doing the procedure / surgery? Dr Varela   Date of surgery / procedure: 27 Jun 24   Time of surgery / procedure: 1:30pm OR time   Where do you plan to recover after surgery? at home with family     Fax number for surgical facility: Note does not need to be faxed, will be available electronically in Epic.    Assessment & Plan     The proposed surgical procedure is considered INTERMEDIATE risk.    (Z01.818) Preop general physical exam  (primary encounter diagnosis)  Comment: Patient is a 73 yr old female here for a pre op evaluation. She is having gall bladder surgery.   Plan: Basic metabolic panel  (Ca, Cl, CO2, Creat,         Gluc, K, Na, BUN)            (K80.20) Gall bladder stones  Comment: Cholecystectomy planned for June 27 th  Plan: As above.    (I10) Hypertension goal BP (blood pressure) < 140/90  Comment: Blood pressure is within normal limits.   Plan: May take her losartan and amlodipine on morning of surgery    (E78.5) Hyperlipidemia with target LDL less than 100  Comment: Patient is asked to take her statin as usual  Plan: As above.            - No identified additional risk factors other than previously addressed    Antiplatelet or Anticoagulation Medication Instructions   - aspirin: Discontinue aspirin 7-10 days prior to procedure to reduce bleeding risk. It should be resumed postoperatively.     Additional Medication Instructions  Take all scheduled medications on the day of surgery    Recommendation  Approval given  to proceed with proposed procedure, without further diagnostic evaluation.        Subjective   Sharri is a 73 year old, presenting for the following:  Pre-Op Exam          6/14/2024    11:25 AM   Additional Questions   Roomed by Mitra FREDERICK CMA   Accompanied by self         6/14/2024    11:25 AM   Patient Reported Additional Medications   Patient reports taking the following new medications none     HPI related to upcoming procedure: Patient is a 73 yr old female here for a pre op evaluation. She is having gall bladder surgery on June 27 th. She feels well today and denies any acute symptoms. She has a history of CAD with stent placement in 2010. She had a recent ER visit in Feb and EKG done then was normal  and she is able to do her ADLS without much difficulty.   We went over her medications and she can take her medication with a tiny sip of water on the morning of surgery. She denies any chest pain or shortness of breath at this time.         6/9/2024   Pre-Op Questionnaire   Have you ever had a heart attack or stroke? (!) YES    Have you ever had surgery on your heart or blood vessels, such as a stent placement, a coronary artery bypass, or surgery on an artery in your head, neck, heart, or legs? (!) YES    Do you have chest pain with activity? No   Do you have a history of heart failure? No   Do you currently have a cold, bronchitis or symptoms of other infection? No   Do you have a cough, shortness of breath, or wheezing? No   Do you or anyone in your family have previous history of blood clots? No   Do you or does anyone in your family have a serious bleeding problem such as prolonged bleeding following surgeries or cuts? No   Have you ever had problems with anemia or been told to take iron pills? No   Have you had any abnormal blood loss such as black, tarry or bloody stools, or abnormal vaginal bleeding? (!) YES    Have you ever had a blood transfusion? No   Are you willing to have a blood transfusion if it is  medically needed before, during, or after your surgery? Yes   Have you or any of your relatives ever had problems with anesthesia? No   Do you have sleep apnea, excessive snoring or daytime drowsiness? No   Do you have any artifical heart valves or other implanted medical devices like a pacemaker, defibrillator, or continuous glucose monitor? No   Do you have artificial joints? No   Are you allergic to latex? No     Health Care Directive  Patient has a Health Care Directive on file      Preoperative Review of    reviewed - no record of controlled substances prescribed.      Status of Chronic Conditions:  CAD - Patient has a longstanding history of moderate-severe CAD. Patient denies recent chest pain or NTG use, denies exercise induced dyspnea or PND. Last Stress test unknown, EKG 2/2024.     HYPERLIPIDEMIA - Patient has a long history of significant Hyperlipidemia requiring medication for treatment with recent good control. Patient reports no problems or side effects with the medication.     HYPERTENSION - Patient has longstanding history of HTN , currently denies any symptoms referable to elevated blood pressure. Specifically denies chest pain, palpitations, dyspnea, orthopnea, PND or peripheral edema. Blood pressure readings have been in normal range. Current medication regimen is as listed below. Patient denies any side effects of medication.     SLEEP PROBLEM - Patient has a longstanding history of fatigue.. Patient has tried OTC medications with limited success.     Patient Active Problem List    Diagnosis Date Noted    Health Care Home 06/28/2011     Priority: High     DX V65.8 REPLACED WITH [facility] (04/08/2013)      Class 2 severe obesity with body mass index (BMI) of 35 to 39.9 with serious comorbidity (H) 05/20/2024     Priority: Medium    Chest pain 05/12/2016     Priority: Medium    Acute chest pain 05/12/2016     Priority: Medium    Rosacea 08/20/2010     Priority: Medium     Hypertension goal BP (blood pressure) < 140/90 07/14/2010     Priority: Medium     Goal <130/80      Hyperlipidemia with target LDL less than 100 07/14/2010     Priority: Medium     January 4, 2011 - . I have asked this patient to obtain a fasting lipid profile tomorrow in Ghada Bermeo's office. She is complaining of some muscle weakness and/or achiness, especially in her legs. Perhaps simvastatin at 40 mg will need to be changed to something more potent such as Crestor. If she is having myalgias or myopathy, a change to a water soluble statin would be in order.   Diagnosis updated by automated process. Provider to review and confirm.      CAD (coronary artery disease)      Priority: Medium     Status post LAD stenting 06/28 for unstable angina. She had in-stent restenosis in the LAD with further stenting on 11/03/2010 when she developed recurrent angina and was hospitalized at [facility]. Coronary angiography demonstrated a 60% circumflex ostial stenosis, a 95% proximal LAD in-stent restenosis along with a 50% ostial stenosis that was unchanged. The right coronary artery had a mild 10%-20% luminal narrowing but nothing significant. Her ejection fraction at that time was 45%. The in-stent restenosis was restented with an Freeland drug-eluting stent.   Plavix was changed to Effient and the cardiologist, Dr. Torrez, felt that Effient should be continued at least 2 years or preferentially longer term. Nuclear stress test late summer 2011. We will consider a stress test earlier than that should the patient have symptoms.    Follow up with Cardiology in 6 months       Preinfarction syndrome (H)      Priority: Medium     (Problem list name updated by automated process. Provider to review and confirm.)        Past Medical History:   Diagnosis Date    Acne rosacea     [place], unstable 2010    CAD (coronary artery disease) 2010    LAD stent x 2    Fibrocystic breast     Hepatitis     age 18    Iritis      14 YEARS OLD    Menopause     AN HRT    MI (myocardial infarction) (H)     non T wave MI    NONSPECIFIC MEDICAL HISTORY 7/14/09    HEART MURMUR- NORMAL ECHO    Shingles 05/2017     Past Surgical History:   Procedure Laterality Date    ANGIOGRAM  6/2010    angiogram  11/3/2010    in stent restenosis    PVD STENTING  2010     x 2, LAD     Current Outpatient Medications   Medication Sig Dispense Refill    amLODIPine (NORVASC) 10 MG tablet Take 1 tablet (10 mg) by mouth daily 90 tablet 1    aspirin 81 MG EC tablet Take 81 mg by mouth daily      atorvastatin (LIPITOR) 40 MG tablet TAKE 1 TABLET DAILY 90 tablet 3    esomeprazole (NEXIUM) 20 MG DR capsule Take 20 mg by mouth every morning (before breakfast) Take 30-60 minutes before eating.      losartan (COZAAR) 50 MG tablet Take 1 tablet (50 mg) by mouth 2 times daily 180 tablet 2    nitroGLYcerin (NITROSTAT) 0.4 MG sublingual tablet Place 1 tablet (0.4 mg) under the tongue every 5 minutes as needed for chest pain 25 tablet 1    ORDER FOR DME Blood pressure cuff 1 Device 0       Allergies   Allergen Reactions    Carvedilol Nausea    Lisinopril Cough    Sulfa Antibiotics Rash        Social History     Tobacco Use    Smoking status: Never    Smokeless tobacco: Never    Tobacco comments:     never smoker; non-smoking household   Substance Use Topics    Alcohol use: Yes     Comment: 1 glass of wine almost every day     Family History   Problem Relation Age of Onset    Hypertension Mother     Heart Disease Mother         CHF    Alzheimer Disease Father     Hypertension Daughter     High cholesterol Daughter     Breast Cancer No family hx of      History   Drug Use No     Comment: never             Review of Systems  CONSTITUTIONAL: NEGATIVE for fever, chills, change in weight  INTEGUMENTARY/SKIN: NEGATIVE for worrisome rashes, moles or lesions  EYES: NEGATIVE for vision changes or irritation  ENT/MOUTH: NEGATIVE for ear, mouth and throat problems  RESP: NEGATIVE for  "significant cough or SOB  CV: NEGATIVE for chest pain, palpitations or peripheral edema  GI: POSITIVE for abdominal pain RUQ  : NEGATIVE for frequency, dysuria, or hematuria  MUSCULOSKELETAL: NEGATIVE for significant arthralgias or myalgia  NEURO: NEGATIVE for weakness, dizziness or paresthesias  ENDOCRINE: NEGATIVE for temperature intolerance, skin/hair changes  HEME: NEGATIVE for bleeding problems  PSYCHIATRIC: NEGATIVE for changes in mood or affect    Objective    /70 (BP Location: Left arm, Patient Position: Sitting, Cuff Size: Adult Large)   Pulse 77   Temp 97.8  F (36.6  C) (Tympanic)   Resp 16   Ht 1.6 m (5' 3\")   Wt 91.2 kg (201 lb)   LMP  (LMP Unknown)   SpO2 98%   BMI 35.61 kg/m     Estimated body mass index is 35.61 kg/m  as calculated from the following:    Height as of this encounter: 1.6 m (5' 3\").    Weight as of this encounter: 91.2 kg (201 lb).  Physical Exam  GENERAL: alert and no distress  EYES: Eyes grossly normal to inspection, PERRL and conjunctivae and sclerae normal  HENT: ear canals and TM's normal, nose and mouth without ulcers or lesions  NECK: no adenopathy, no asymmetry, masses, or scars  RESP: lungs clear to auscultation - no rales, rhonchi or wheezes  CV: regular rate and rhythm, normal S1 S2, no S3 or S4, no murmur, click or rub, no peripheral edema  ABDOMEN: soft, nontender, no hepatosplenomegaly, no masses and bowel sounds normal  MS: no gross musculoskeletal defects noted, no edema  SKIN: no suspicious lesions or rashes  PSYCH: mentation appears normal, affect normal/bright    Recent Labs   Lab Test 02/10/24  0958 10/12/23  1447   HGB 14.1 13.8    320   INR  --  0.92    142   POTASSIUM 3.7 3.9   CR 0.85 1.00*        Diagnostics  Labs pending at this time.  Results will be reviewed when available.   No EKG this visit, completed in the last 90 days.  EKG done in Feb of 2024  EKG is in this rhythm at 94 bpm with a left axis.  There is no ST change other " than possible ST depression in lead II.  There is a normal R progression no Q waves.  Normal intervals.  Normal conduction.  No ectopy.  Impression sinus rhythm 94 bpm with medium marginal ST depression in lead II.  No other significant changes.    Revised Cardiac Risk Index (RCRI)  The patient has the following serious cardiovascular risks for perioperative complications:   - No serious cardiac risks = 0 points     RCRI Interpretation: 0 points: Class I (very low risk - 0.4% complication rate)         Signed Electronically by: Demi Martínez MD  Copy of this evaluation report is provided to requesting physician.

## 2024-06-14 NOTE — PATIENT INSTRUCTIONS
You make take your blood pressure medications on the morning of surgery with a tiny sip of water    Avoid aspirin, Ibuprofen, Advil, Aleve a week before the surgery

## 2024-06-14 NOTE — H&P (VIEW-ONLY)
Preoperative Evaluation  [facility]  [address] [address]  Phone: [phone]  Primary Provider: Demi Martínez MD  Pre-op Performing Provider: Demi Martínez MD  Jun 14, 2024 6/9/2024   Surgical Information   What procedure is being done? Gallbladder removed-CHOLECYSTECTOMY, ROBOT-ASSISTED, LAPAROSCOPIC, USING DA MELY XI    Facility or Hospital where procedure/surgery will be performed: [facility]   Who is doing the procedure / surgery? Dr Varela   Date of surgery / procedure: 27 Jun 24   Time of surgery / procedure: 1:30pm OR time   Where do you plan to recover after surgery? at home with family     Fax number for surgical facility: Note does not need to be faxed, will be available electronically in Epic.    Assessment & Plan     The proposed surgical procedure is considered INTERMEDIATE risk.    (Z01.818) Preop general physical exam  (primary encounter diagnosis)  Comment: Patient is a 73 yr old female here for a pre op evaluation. She is having gall bladder surgery.   Plan: Basic metabolic panel  (Ca, Cl, CO2, Creat,         Gluc, K, Na, BUN)            (K80.20) Gall bladder stones  Comment: Cholecystectomy planned for June 27 th  Plan: As above.    (I10) Hypertension goal BP (blood pressure) < 140/90  Comment: Blood pressure is within normal limits.   Plan: May take her losartan and amlodipine on morning of surgery    (E78.5) Hyperlipidemia with target LDL less than 100  Comment: Patient is asked to take her statin as usual  Plan: As above.            - No identified additional risk factors other than previously addressed    Antiplatelet or Anticoagulation Medication Instructions   - aspirin: Discontinue aspirin 7-10 days prior to procedure to reduce bleeding risk. It should be resumed postoperatively.     Additional Medication Instructions  Take all scheduled medications on the day of surgery    Recommendation  Approval given  to proceed with proposed procedure, without further diagnostic evaluation.        Subjective   Sharri is a 73 year old, presenting for the following:  Pre-Op Exam          6/14/2024    11:25 AM   Additional Questions   Roomed by Mitra FREDERICK CMA   Accompanied by self         6/14/2024    11:25 AM   Patient Reported Additional Medications   Patient reports taking the following new medications none     HPI related to upcoming procedure: Patient is a 73 yr old female here for a pre op evaluation. She is having gall bladder surgery on June 27 th. She feels well today and denies any acute symptoms. She has a history of CAD with stent placement in 2010. She had a recent ER visit in Feb and EKG done then was normal  and she is able to do her ADLS without much difficulty.   We went over her medications and she can take her medication with a tiny sip of water on the morning of surgery. She denies any chest pain or shortness of breath at this time.         6/9/2024   Pre-Op Questionnaire   Have you ever had a heart attack or stroke? (!) YES    Have you ever had surgery on your heart or blood vessels, such as a stent placement, a coronary artery bypass, or surgery on an artery in your head, neck, heart, or legs? (!) YES    Do you have chest pain with activity? No   Do you have a history of heart failure? No   Do you currently have a cold, bronchitis or symptoms of other infection? No   Do you have a cough, shortness of breath, or wheezing? No   Do you or anyone in your family have previous history of blood clots? No   Do you or does anyone in your family have a serious bleeding problem such as prolonged bleeding following surgeries or cuts? No   Have you ever had problems with anemia or been told to take iron pills? No   Have you had any abnormal blood loss such as black, tarry or bloody stools, or abnormal vaginal bleeding? (!) YES    Have you ever had a blood transfusion? No   Are you willing to have a blood transfusion if it is  medically needed before, during, or after your surgery? Yes   Have you or any of your relatives ever had problems with anesthesia? No   Do you have sleep apnea, excessive snoring or daytime drowsiness? No   Do you have any artifical heart valves or other implanted medical devices like a pacemaker, defibrillator, or continuous glucose monitor? No   Do you have artificial joints? No   Are you allergic to latex? No     Health Care Directive  Patient has a Health Care Directive on file      Preoperative Review of    reviewed - no record of controlled substances prescribed.      Status of Chronic Conditions:  CAD - Patient has a longstanding history of moderate-severe CAD. Patient denies recent chest pain or NTG use, denies exercise induced dyspnea or PND. Last Stress test unknown, EKG 2/2024.     HYPERLIPIDEMIA - Patient has a long history of significant Hyperlipidemia requiring medication for treatment with recent good control. Patient reports no problems or side effects with the medication.     HYPERTENSION - Patient has longstanding history of HTN , currently denies any symptoms referable to elevated blood pressure. Specifically denies chest pain, palpitations, dyspnea, orthopnea, PND or peripheral edema. Blood pressure readings have been in normal range. Current medication regimen is as listed below. Patient denies any side effects of medication.     SLEEP PROBLEM - Patient has a longstanding history of fatigue.. Patient has tried OTC medications with limited success.     Patient Active Problem List    Diagnosis Date Noted    Health Care Home 06/28/2011     Priority: High     DX V65.8 REPLACED WITH [facility] C[facility] HOME (04/08/2013)      Class 2 severe obesity with body mass index (BMI) of 35 to 39.9 with serious comorbidity (H) 05/20/2024     Priority: Medium    Chest pain 05/12/2016     Priority: Medium    Acute chest pain 05/12/2016     Priority: Medium    Rosacea 08/20/2010     Priority: Medium     Hypertension goal BP (blood pressure) < 140/90 07/14/2010     Priority: Medium     Goal <130/80      Hyperlipidemia with target LDL less than 100 07/14/2010     Priority: Medium     January 4, 2011 - . I have asked this patient to obtain a fasting lipid profile tomorrow in Ghada Bermeo's office. She is complaining of some muscle weakness and/or achiness, especially in her legs. Perhaps simvastatin at 40 mg will need to be changed to something more potent such as Crestor. If she is having myalgias or myopathy, a change to a water soluble statin would be in order.   Diagnosis updated by automated process. Provider to review and confirm.      CAD (coronary artery disease)      Priority: Medium     Status post LAD stenting 06/28 for unstable angina. She had in-stent restenosis in the LAD with further stenting on 11/03/2010 when she developed recurrent angina and was hospitalized at [facility]. Coronary angiography demonstrated a 60% circumflex ostial stenosis, a 95% proximal LAD in-stent restenosis along with a 50% ostial stenosis that was unchanged. The right coronary artery had a mild 10%-20% luminal narrowing but nothing significant. Her ejection fraction at that time was 45%. The in-stent restenosis was restented with an Leeper drug-eluting stent.   Plavix was changed to Effient and the cardiologist, Dr. Torrez, felt that Effient should be continued at least 2 years or preferentially longer term. Nuclear stress test late summer 2011. We will consider a stress test earlier than that should the patient have symptoms.    Follow up with Cardiology in 6 months       Preinfarction syndrome (H)      Priority: Medium     (Problem list name updated by automated process. Provider to review and confirm.)        Past Medical History:   Diagnosis Date    Acne rosacea     Anginas, unstable 2010    CAD (coronary artery disease) 2010    LAD stent x 2    Fibrocystic breast     Hepatitis     age 18    Iritis      14 YEARS OLD    Menopause     AN HRT    MI (myocardial infarction) (H)     non T wave MI    NONSPECIFIC MEDICAL HISTORY 7/14/09    HEART MURMUR- NORMAL ECHO    Shingles 05/2017     Past Surgical History:   Procedure Laterality Date    ANGIOGRAM  6/2010    angiogram  11/3/2010    in stent restenosis    PVD STENTING  2010     x 2, LAD     Current Outpatient Medications   Medication Sig Dispense Refill    amLODIPine (NORVASC) 10 MG tablet Take 1 tablet (10 mg) by mouth daily 90 tablet 1    aspirin 81 MG EC tablet Take 81 mg by mouth daily      atorvastatin (LIPITOR) 40 MG tablet TAKE 1 TABLET DAILY 90 tablet 3    esomeprazole (NEXIUM) 20 MG DR capsule Take 20 mg by mouth every morning (before breakfast) Take 30-60 minutes before eating.      losartan (COZAAR) 50 MG tablet Take 1 tablet (50 mg) by mouth 2 times daily 180 tablet 2    nitroGLYcerin (NITROSTAT) 0.4 MG sublingual tablet Place 1 tablet (0.4 mg) under the tongue every 5 minutes as needed for chest pain 25 tablet 1    ORDER FOR DME Blood pressure cuff 1 Device 0       Allergies   Allergen Reactions    Carvedilol Nausea    Lisinopril Cough    Sulfa Antibiotics Rash        Social History     Tobacco Use    Smoking status: Never    Smokeless tobacco: Never    Tobacco comments:     never smoker; non-smoking household   Substance Use Topics    Alcohol use: Yes     Comment: 1 glass of wine almost every day     Family History   Problem Relation Age of Onset    Hypertension Mother     Heart Disease Mother         CHF    Alzheimer Disease Father     Hypertension Daughter     High cholesterol Daughter     Breast Cancer No family hx of      History   Drug Use No     Comment: never             Review of Systems  CONSTITUTIONAL: NEGATIVE for fever, chills, change in weight  INTEGUMENTARY/SKIN: NEGATIVE for worrisome rashes, moles or lesions  EYES: NEGATIVE for vision changes or irritation  ENT/MOUTH: NEGATIVE for ear, mouth and throat problems  RESP: NEGATIVE for  "significant cough or SOB  CV: NEGATIVE for chest pain, palpitations or peripheral edema  GI: POSITIVE for abdominal pain RUQ  : NEGATIVE for frequency, dysuria, or hematuria  MUSCULOSKELETAL: NEGATIVE for significant arthralgias or myalgia  NEURO: NEGATIVE for weakness, dizziness or paresthesias  ENDOCRINE: NEGATIVE for temperature intolerance, skin/hair changes  HEME: NEGATIVE for bleeding problems  PSYCHIATRIC: NEGATIVE for changes in mood or affect    Objective    /70 (BP Location: Left arm, Patient Position: Sitting, Cuff Size: Adult Large)   Pulse 77   Temp 97.8  F (36.6  C) (Tympanic)   Resp 16   Ht 1.6 m (5' 3\")   Wt 91.2 kg (201 lb)   LMP  (LMP Unknown)   SpO2 98%   BMI 35.61 kg/m     Estimated body mass index is 35.61 kg/m  as calculated from the following:    Height as of this encounter: 1.6 m (5' 3\").    Weight as of this encounter: 91.2 kg (201 lb).  Physical Exam  GENERAL: alert and no distress  EYES: Eyes grossly normal to inspection, PERRL and conjunctivae and sclerae normal  HENT: ear canals and TM's normal, nose and mouth without ulcers or lesions  NECK: no adenopathy, no asymmetry, masses, or scars  RESP: lungs clear to auscultation - no rales, rhonchi or wheezes  CV: regular rate and rhythm, normal S1 S2, no S3 or S4, no murmur, click or rub, no peripheral edema  ABDOMEN: soft, nontender, no hepatosplenomegaly, no masses and bowel sounds normal  MS: no gross musculoskeletal defects noted, no edema  SKIN: no suspicious lesions or rashes  PSYCH: mentation appears normal, affect normal/bright    Recent Labs   Lab Test 02/10/24  0958 10/12/23  1447   HGB 14.1 13.8    320   INR  --  0.92    142   POTASSIUM 3.7 3.9   CR 0.85 1.00*        Diagnostics  Labs pending at this time.  Results will be reviewed when available.   No EKG this visit, completed in the last 90 days.  EKG done in Feb of 2024  EKG is in this rhythm at 94 bpm with a left axis.  There is no ST change other " than possible ST depression in lead II.  There is a normal R progression no Q waves.  Normal intervals.  Normal conduction.  No ectopy.  Impression sinus rhythm 94 bpm with medium marginal ST depression in lead II.  No other significant changes.    Revised Cardiac Risk Index (RCRI)  The patient has the following serious cardiovascular risks for perioperative complications:   - No serious cardiac risks = 0 points     RCRI Interpretation: 0 points: Class I (very low risk - 0.4% complication rate)         Signed Electronically by: Demi Martínez MD  Copy of this evaluation report is provided to requesting physician.

## 2024-06-15 NOTE — RESULT ENCOUNTER NOTE
Please inform patient that test result was within normal parameters.   Thank you.     Demi Martínez M.D.

## 2024-06-17 ENCOUNTER — TELEPHONE (OUTPATIENT)
Dept: CARDIOLOGY | Facility: CLINIC | Age: 74
End: 2024-06-17
Payer: COMMERCIAL

## 2024-06-17 NOTE — TELEPHONE ENCOUNTER
[facility] Call Center    Phone Message    May a detailed message be left on voicemail: yes     Reason for Call: Other: Jaquelin would like a call back to discuss whether or not the medication Coq-10 at 100 mg's is safe to take with her heart mediation.     Action Taken: Other: Cardiology    Travel Screening: Not Applicable    Thank you!  [facility]

## 2024-06-17 NOTE — TELEPHONE ENCOUNTER
COQ 10, is this something you would recommend?- she is currently on atorvastatin 40 mg daily.       Tamiko Luna RN  Cardiology Care Coordinator  [facility]  [phone] option 1

## 2024-06-19 RX ORDER — AMLODIPINE BESYLATE 10 MG/1
10 TABLET ORAL DAILY
Qty: 90 TABLET | Refills: 0 | Status: SHIPPED | OUTPATIENT
Start: 2024-06-19 | End: 2024-08-01

## 2024-06-19 NOTE — TELEPHONE ENCOUNTER
amLODIPine (NORVASC) 10 MG tablet 90 tablet 1 1/3/2024     Last Office Visit: 7/27/23  Future Office visit:   8/1/24      Calcium Channel Blockers Protocol  Btieom44/10/2024 12:13 AM   Protocol Details Blood pressure under 140/90 in past 12 months        BP Readings from Last 3 Encounters:   06/14/24 134/70   05/29/24 (!) 147/81   05/20/24 138/60   BP reviewed, elevated on 5/29 during surgery consult      Refill protocol passed    Rebecca Reyes RN  P Red Flag Triage/MRT

## 2024-06-20 ENCOUNTER — MYC MEDICAL ADVICE (OUTPATIENT)
Dept: CARDIOLOGY | Facility: CLINIC | Age: 74
End: 2024-06-20
Payer: COMMERCIAL

## 2024-06-23 ENCOUNTER — ANESTHESIA EVENT (OUTPATIENT)
Dept: SURGERY | Facility: CLINIC | Age: 74
End: 2024-06-23
Payer: MEDICARE

## 2024-06-23 ASSESSMENT — LIFESTYLE VARIABLES: TOBACCO_USE: 0

## 2024-06-24 NOTE — ANESTHESIA PREPROCEDURE EVALUATION
Anesthesia Pre-Procedure Evaluation    Patient: Sharri Elaine   MRN: [id number] : 1950        Procedure : Procedure(s):  CHOLECYSTECTOMY, ROBOT-ASSISTED, LAPAROSCOPIC, USING DA MELY XI          Past Medical History:   Diagnosis Date     Acne rosacea      Anginas, unstable      CAD (coronary artery disease)     LAD stent x 2     Fibrocystic breast      Hepatitis     age 18     Iritis     14 YEARS OLD     Menopause     AN HRT     MI (myocardial infarction) (H)     non T wave MI     NONSPECIFIC MEDICAL HISTORY 2009    HEART MURMUR- NORMAL ECHO     Shingles 2017      Past Surgical History:   Procedure Laterality Date     ANGIOGRAM  2010     angiogram  11/3/2010    in stent restenosis     PVD STENTING  2010     x 2, LAD      Allergies   Allergen Reactions     Carvedilol Nausea     Lisinopril Cough     Sulfa Antibiotics Rash      Social History     Tobacco Use     Smoking status: Never     Smokeless tobacco: Never     Tobacco comments:     never smoker; non-smoking household   Substance Use Topics     Alcohol use: Yes     Comment: 1 glass of wine almost every day      Wt Readings from Last 1 Encounters:   24 91.2 kg (201 lb)        Anesthesia Evaluation   Pt has had prior anesthetic.         ROS/MED HX  ENT/Pulmonary:    (-) tobacco use   Neurologic:  - neg neurologic ROS     Cardiovascular: Comment: Preinfarction syndrome    (+) Dyslipidemia hypertension- -  CAD angina- past MI - stent (to LAD)- 1                                Previous cardiac testing   Echo: Date: Results:    Stress Test:  Date: Results:    ECG Reviewed:  Date: 2/10/24 Results:  Sinus Rhythm   Low voltage in precordial leads.  -Nonspecific ST depression -Nondiagnostic.  Cath:  Date: 13 Results:  underwent stenting of her proximal LAD in . She subsequently presented with a non-ST elevation MI and was found to have severe in-stent restenosis of her proximal LAD stent.This was treated with PTCA and stenting.   At that time, she had some residual proximal LAD stenosis, as well as 50-60% proximal circumflex   stenosis.  She was managed medically and did well from a cardiopulmonary standpoint. Over the summer, she started noticing epigastric discomfort that was often associated with eating.  She underwent stress test which showed mild inferior ischemia.  She is now referred for cardiac catheterization.     CONCLUSIONS:   1. Moderate coronary artery disease.   2.  Normal left ventricular filling pressures.      The patient has moderate coronary artery disease.  Her coronary   artery disease has not progressed since her last angiogram in 2010.    In fact, her proximal LAD, as well as the proximal circumflex   lesions, appear less severe today than in the past.  Recommend   continued aggressive medical program.       METS/Exercise Tolerance:     Hematologic:  - neg hematologic  ROS     Musculoskeletal:  - neg musculoskeletal ROS     GI/Hepatic:     (+)          cholecystitis/cholelithiasis, hepatitis  liver disease,       Renal/Genitourinary:       Endo:     (+)               Obesity,       Psychiatric/Substance Use:  - neg psychiatric ROS     Infectious Disease:       Malignancy:  - neg malignancy ROS     Other:            Physical Exam    Airway        Mallampati: II   TM distance: > 3 FB   Neck ROM: full   Mouth opening: > 3 cm    Respiratory Devices and Support         Dental         B=Bridge, C=Chipped, L=Loose, M=Missing    Cardiovascular   cardiovascular exam normal          Pulmonary   pulmonary exam normal            OUTSIDE LABS:  CBC:   Lab Results   Component Value Date    WBC 7.5 02/10/2024    WBC 7.8 10/12/2023    HGB 14.1 02/10/2024    HGB 13.8 10/12/2023    HCT 42.6 02/10/2024    HCT 43.5 10/12/2023     02/10/2024     10/12/2023     BMP:   Lab Results   Component Value Date     06/14/2024     02/10/2024    POTASSIUM 5.0 06/14/2024    POTASSIUM 3.7 02/10/2024    CHLORIDE 105 06/14/2024  "   CHLORIDE 102 02/10/2024    CO2 26 06/14/2024    CO2 25 02/10/2024    BUN 20.0 06/14/2024    BUN 15.9 02/10/2024    CR 0.95 06/14/2024    CR 0.85 02/10/2024    GLC 91 06/14/2024     (H) 02/10/2024     COAGS:   Lab Results   Component Value Date    PTT 33 09/19/2013    INR 0.92 10/12/2023     POC: No results found for: \"BGM\", \"HCG\", \"HCGS\"  HEPATIC:   Lab Results   Component Value Date    ALBUMIN 4.3 02/10/2024    PROTTOTAL 8.3 02/10/2024    ALT 24 02/10/2024    AST 24 02/10/2024    ALKPHOS 245 (H) 02/10/2024    BILITOTAL 0.4 02/10/2024     OTHER:   Lab Results   Component Value Date    YASSINE 9.6 06/14/2024    LIPASE 14 02/10/2024    TSH 1.71 07/22/2010       Anesthesia Plan    ASA Status:  3       Anesthesia Type: General.     - Airway: ETT   Induction: Intravenous.   Maintenance: TIVA.        Consents    Anesthesia Plan(s) and associated risks, benefits, and realistic alternatives discussed. Questions answered and patient/representative(s) expressed understanding.     - Discussed: Risks, Benefits and Alternatives for BOTH SEDATION and the PROCEDURE were discussed     - Discussed with:  Patient            Postoperative Care    Pain management: IV analgesics, Oral pain medications.   PONV prophylaxis: Ondansetron (or other 5HT-3), Dexamethasone or Solumedrol     Comments:             MONICA Sawyer CRNA    I have reviewed the pertinent notes and labs in the chart from the past 30 days and (re)examined the patient.  Any updates or changes from those notes are reflected in this note.              # Obesity: Estimated body mass index is 35.61 kg/m  as calculated from the following:    Height as of 6/14/24: 1.6 m (5' 3\").    Weight as of 6/14/24: 91.2 kg (201 lb).      "

## 2024-06-27 ENCOUNTER — HOSPITAL ENCOUNTER (OUTPATIENT)
Facility: CLINIC | Age: 74
Discharge: HOME | End: 2024-06-27
Attending: SURGERY | Admitting: SURGERY
Payer: MEDICARE

## 2024-06-27 ENCOUNTER — ANESTHESIA (OUTPATIENT)
Dept: SURGERY | Facility: CLINIC | Age: 74
End: 2024-06-27
Payer: MEDICARE

## 2024-06-27 VITALS
HEART RATE: 86 BPM | BODY MASS INDEX: 35.61 KG/M2 | HEIGHT: 63 IN | WEIGHT: 201 LBS | SYSTOLIC BLOOD PRESSURE: 154 MMHG | OXYGEN SATURATION: 97 % | DIASTOLIC BLOOD PRESSURE: 76 MMHG | TEMPERATURE: 97.9 F | RESPIRATION RATE: 12 BRPM

## 2024-06-27 DIAGNOSIS — Z90.49 S/P CHOLECYSTECTOMY: Primary | ICD-10-CM

## 2024-06-27 PROCEDURE — 999N000141 HC STATISTIC PRE-PROCEDURE NURSING ASSESSMENT: Performed by: SURGERY

## 2024-06-27 PROCEDURE — 47562 LAPAROSCOPIC CHOLECYSTECTOMY: CPT | Performed by: SURGERY

## 2024-06-27 PROCEDURE — 258N000003 HC RX IP 258 OP 636: Performed by: NURSE ANESTHETIST, CERTIFIED REGISTERED

## 2024-06-27 PROCEDURE — 88304 TISSUE EXAM BY PATHOLOGIST: CPT | Mod: 26 | Performed by: PATHOLOGY

## 2024-06-27 PROCEDURE — 258N000003 HC RX IP 258 OP 636: Performed by: SURGERY

## 2024-06-27 PROCEDURE — 88304 TISSUE EXAM BY PATHOLOGIST: CPT | Mod: TC | Performed by: SURGERY

## 2024-06-27 PROCEDURE — 710N000012 HC RECOVERY PHASE 2, PER MINUTE: Performed by: SURGERY

## 2024-06-27 PROCEDURE — 360N000080 HC SURGERY LEVEL 7, PER MIN: Performed by: SURGERY

## 2024-06-27 PROCEDURE — 250N000011 HC RX IP 250 OP 636: Mod: JZ | Performed by: SURGERY

## 2024-06-27 PROCEDURE — 250N000013 HC RX MED GY IP 250 OP 250 PS 637: Performed by: PHYSICIAN ASSISTANT

## 2024-06-27 PROCEDURE — 370N000017 HC ANESTHESIA TECHNICAL FEE, PER MIN: Performed by: SURGERY

## 2024-06-27 PROCEDURE — 271N000001 HC OR GENERAL SUPPLY NON-STERILE: Performed by: SURGERY

## 2024-06-27 PROCEDURE — 250N000013 HC RX MED GY IP 250 OP 250 PS 637: Performed by: NURSE ANESTHETIST, CERTIFIED REGISTERED

## 2024-06-27 PROCEDURE — 272N000001 HC OR GENERAL SUPPLY STERILE: Performed by: SURGERY

## 2024-06-27 PROCEDURE — 250N000011 HC RX IP 250 OP 636: Performed by: NURSE ANESTHETIST, CERTIFIED REGISTERED

## 2024-06-27 PROCEDURE — 710N000009 HC RECOVERY PHASE 1, LEVEL 1, PER MIN: Performed by: SURGERY

## 2024-06-27 PROCEDURE — 250N000009 HC RX 250: Performed by: NURSE ANESTHETIST, CERTIFIED REGISTERED

## 2024-06-27 PROCEDURE — 250N000009 HC RX 250: Performed by: SURGERY

## 2024-06-27 RX ORDER — BUPIVACAINE HYDROCHLORIDE AND EPINEPHRINE 2.5; 5 MG/ML; UG/ML
INJECTION, SOLUTION INFILTRATION; PERINEURAL PRN
Status: DISCONTINUED | OUTPATIENT
Start: 2024-06-27 | End: 2024-06-27 | Stop reason: HOSPADM

## 2024-06-27 RX ORDER — NALOXONE HYDROCHLORIDE 0.4 MG/ML
0.1 INJECTION, SOLUTION INTRAMUSCULAR; INTRAVENOUS; SUBCUTANEOUS
Status: DISCONTINUED | OUTPATIENT
Start: 2024-06-27 | End: 2024-06-27 | Stop reason: HOSPADM

## 2024-06-27 RX ORDER — ACETAMINOPHEN 325 MG/1
975 TABLET ORAL ONCE
Status: COMPLETED | OUTPATIENT
Start: 2024-06-27 | End: 2024-06-27

## 2024-06-27 RX ORDER — CEFAZOLIN SODIUM/WATER 2 G/20 ML
2 SYRINGE (ML) INTRAVENOUS SEE ADMIN INSTRUCTIONS
Status: DISCONTINUED | OUTPATIENT
Start: 2024-06-27 | End: 2024-06-27 | Stop reason: HOSPADM

## 2024-06-27 RX ORDER — SODIUM CHLORIDE, SODIUM LACTATE, POTASSIUM CHLORIDE, CALCIUM CHLORIDE 600; 310; 30; 20 MG/100ML; MG/100ML; MG/100ML; MG/100ML
INJECTION, SOLUTION INTRAVENOUS CONTINUOUS
Status: DISCONTINUED | OUTPATIENT
Start: 2024-06-27 | End: 2024-06-27 | Stop reason: HOSPADM

## 2024-06-27 RX ORDER — FENTANYL CITRATE 50 UG/ML
25 INJECTION, SOLUTION INTRAMUSCULAR; INTRAVENOUS EVERY 5 MIN PRN
Status: DISCONTINUED | OUTPATIENT
Start: 2024-06-27 | End: 2024-06-27 | Stop reason: HOSPADM

## 2024-06-27 RX ORDER — ONDANSETRON 2 MG/ML
INJECTION INTRAMUSCULAR; INTRAVENOUS PRN
Status: DISCONTINUED | OUTPATIENT
Start: 2024-06-27 | End: 2024-06-27

## 2024-06-27 RX ORDER — CEFAZOLIN SODIUM/WATER 2 G/20 ML
2 SYRINGE (ML) INTRAVENOUS
Status: COMPLETED | OUTPATIENT
Start: 2024-06-27 | End: 2024-06-27

## 2024-06-27 RX ORDER — LIDOCAINE HYDROCHLORIDE 20 MG/ML
INJECTION, SOLUTION INFILTRATION; PERINEURAL PRN
Status: DISCONTINUED | OUTPATIENT
Start: 2024-06-27 | End: 2024-06-27

## 2024-06-27 RX ORDER — FENTANYL CITRATE 50 UG/ML
INJECTION, SOLUTION INTRAMUSCULAR; INTRAVENOUS PRN
Status: DISCONTINUED | OUTPATIENT
Start: 2024-06-27 | End: 2024-06-27

## 2024-06-27 RX ORDER — INDOCYANINE GREEN AND WATER 25 MG
2.5 KIT INJECTION ONCE
Status: COMPLETED | OUTPATIENT
Start: 2024-06-27 | End: 2024-06-27

## 2024-06-27 RX ORDER — OXYCODONE HYDROCHLORIDE 5 MG/1
5 TABLET ORAL
Status: COMPLETED | OUTPATIENT
Start: 2024-06-27 | End: 2024-06-27

## 2024-06-27 RX ORDER — HYDROMORPHONE HCL IN WATER/PF 6 MG/30 ML
0.2 PATIENT CONTROLLED ANALGESIA SYRINGE INTRAVENOUS EVERY 5 MIN PRN
Status: DISCONTINUED | OUTPATIENT
Start: 2024-06-27 | End: 2024-06-27 | Stop reason: HOSPADM

## 2024-06-27 RX ORDER — DEXAMETHASONE SODIUM PHOSPHATE 4 MG/ML
INJECTION, SOLUTION INTRA-ARTICULAR; INTRALESIONAL; INTRAMUSCULAR; INTRAVENOUS; SOFT TISSUE PRN
Status: DISCONTINUED | OUTPATIENT
Start: 2024-06-27 | End: 2024-06-27

## 2024-06-27 RX ORDER — ONDANSETRON 2 MG/ML
4 INJECTION INTRAMUSCULAR; INTRAVENOUS EVERY 30 MIN PRN
Status: DISCONTINUED | OUTPATIENT
Start: 2024-06-27 | End: 2024-06-27 | Stop reason: HOSPADM

## 2024-06-27 RX ORDER — OXYCODONE HYDROCHLORIDE 5 MG/1
5 TABLET ORAL EVERY 6 HOURS PRN
Qty: 12 TABLET | Refills: 0 | Status: SHIPPED | OUTPATIENT
Start: 2024-06-27 | End: 2024-10-02

## 2024-06-27 RX ORDER — HYDROMORPHONE HCL IN WATER/PF 6 MG/30 ML
0.4 PATIENT CONTROLLED ANALGESIA SYRINGE INTRAVENOUS EVERY 5 MIN PRN
Status: DISCONTINUED | OUTPATIENT
Start: 2024-06-27 | End: 2024-06-27 | Stop reason: HOSPADM

## 2024-06-27 RX ORDER — PROPOFOL 10 MG/ML
INJECTION, EMULSION INTRAVENOUS PRN
Status: DISCONTINUED | OUTPATIENT
Start: 2024-06-27 | End: 2024-06-27

## 2024-06-27 RX ORDER — FENTANYL CITRATE 50 UG/ML
50 INJECTION, SOLUTION INTRAMUSCULAR; INTRAVENOUS EVERY 5 MIN PRN
Status: DISCONTINUED | OUTPATIENT
Start: 2024-06-27 | End: 2024-06-27 | Stop reason: HOSPADM

## 2024-06-27 RX ORDER — ONDANSETRON 4 MG/1
4 TABLET, ORALLY DISINTEGRATING ORAL EVERY 30 MIN PRN
Status: DISCONTINUED | OUTPATIENT
Start: 2024-06-27 | End: 2024-06-27 | Stop reason: HOSPADM

## 2024-06-27 RX ORDER — HEPARIN SODIUM 5000 [USP'U]/.5ML
5000 INJECTION, SOLUTION INTRAVENOUS; SUBCUTANEOUS
Status: COMPLETED | OUTPATIENT
Start: 2024-06-27 | End: 2024-06-27

## 2024-06-27 RX ORDER — PROPOFOL 10 MG/ML
INJECTION, EMULSION INTRAVENOUS CONTINUOUS PRN
Status: DISCONTINUED | OUTPATIENT
Start: 2024-06-27 | End: 2024-06-27

## 2024-06-27 RX ORDER — DEXAMETHASONE SODIUM PHOSPHATE 4 MG/ML
4 INJECTION, SOLUTION INTRA-ARTICULAR; INTRALESIONAL; INTRAMUSCULAR; INTRAVENOUS; SOFT TISSUE
Status: DISCONTINUED | OUTPATIENT
Start: 2024-06-27 | End: 2024-06-27 | Stop reason: HOSPADM

## 2024-06-27 RX ORDER — ACETAMINOPHEN 325 MG/1
650 TABLET ORAL
Status: DISCONTINUED | OUTPATIENT
Start: 2024-06-27 | End: 2024-06-27 | Stop reason: HOSPADM

## 2024-06-27 RX ORDER — LIDOCAINE 40 MG/G
CREAM TOPICAL
Status: DISCONTINUED | OUTPATIENT
Start: 2024-06-27 | End: 2024-06-27 | Stop reason: HOSPADM

## 2024-06-27 RX ADMIN — PHENYLEPHRINE HYDROCHLORIDE 100 MCG: 10 INJECTION INTRAVENOUS at 14:35

## 2024-06-27 RX ADMIN — SUGAMMADEX 200 MG: 100 INJECTION, SOLUTION INTRAVENOUS at 15:24

## 2024-06-27 RX ADMIN — DEXAMETHASONE SODIUM PHOSPHATE 4 MG: 4 INJECTION, SOLUTION INTRA-ARTICULAR; INTRALESIONAL; INTRAMUSCULAR; INTRAVENOUS; SOFT TISSUE at 14:05

## 2024-06-27 RX ADMIN — ONDANSETRON 4 MG: 2 INJECTION INTRAMUSCULAR; INTRAVENOUS at 16:46

## 2024-06-27 RX ADMIN — SODIUM CHLORIDE, POTASSIUM CHLORIDE, SODIUM LACTATE AND CALCIUM CHLORIDE 1000 ML: 600; 310; 30; 20 INJECTION, SOLUTION INTRAVENOUS at 16:14

## 2024-06-27 RX ADMIN — ROCURONIUM BROMIDE 50 MG: 50 INJECTION, SOLUTION INTRAVENOUS at 13:59

## 2024-06-27 RX ADMIN — FENTANYL CITRATE 50 MCG: 50 INJECTION INTRAMUSCULAR; INTRAVENOUS at 13:58

## 2024-06-27 RX ADMIN — LIDOCAINE HYDROCHLORIDE 60 MG: 20 INJECTION, SOLUTION INFILTRATION; PERINEURAL at 13:59

## 2024-06-27 RX ADMIN — PHENYLEPHRINE HYDROCHLORIDE 100 MCG: 10 INJECTION INTRAVENOUS at 15:01

## 2024-06-27 RX ADMIN — ACETAMINOPHEN 975 MG: 325 TABLET, FILM COATED ORAL at 12:39

## 2024-06-27 RX ADMIN — HEPARIN SODIUM 5000 UNITS: 10000 INJECTION, SOLUTION INTRAVENOUS; SUBCUTANEOUS at 14:08

## 2024-06-27 RX ADMIN — PROPOFOL 120 MG: 10 INJECTION, EMULSION INTRAVENOUS at 13:59

## 2024-06-27 RX ADMIN — Medication 2 G: at 13:55

## 2024-06-27 RX ADMIN — ONDANSETRON 4 MG: 2 INJECTION INTRAMUSCULAR; INTRAVENOUS at 15:19

## 2024-06-27 RX ADMIN — Medication 2.5 MG: at 13:04

## 2024-06-27 RX ADMIN — OXYCODONE HYDROCHLORIDE 5 MG: 5 TABLET ORAL at 17:17

## 2024-06-27 RX ADMIN — HYDROMORPHONE HYDROCHLORIDE 0.2 MG: 0.2 INJECTION, SOLUTION INTRAMUSCULAR; INTRAVENOUS; SUBCUTANEOUS at 16:29

## 2024-06-27 RX ADMIN — HYDROMORPHONE HYDROCHLORIDE 0.2 MG: 0.2 INJECTION, SOLUTION INTRAMUSCULAR; INTRAVENOUS; SUBCUTANEOUS at 16:20

## 2024-06-27 RX ADMIN — SODIUM CHLORIDE, POTASSIUM CHLORIDE, SODIUM LACTATE AND CALCIUM CHLORIDE 1000 ML: 600; 310; 30; 20 INJECTION, SOLUTION INTRAVENOUS at 12:56

## 2024-06-27 RX ADMIN — PROPOFOL 150 MCG/KG/MIN: 10 INJECTION, EMULSION INTRAVENOUS at 14:05

## 2024-06-27 RX ADMIN — PHENYLEPHRINE HYDROCHLORIDE 100 MCG: 10 INJECTION INTRAVENOUS at 14:13

## 2024-06-27 RX ADMIN — PHENYLEPHRINE HYDROCHLORIDE 100 MCG: 10 INJECTION INTRAVENOUS at 14:49

## 2024-06-27 RX ADMIN — PHENYLEPHRINE HYDROCHLORIDE 100 MCG: 10 INJECTION INTRAVENOUS at 14:25

## 2024-06-27 ASSESSMENT — ACTIVITIES OF DAILY LIVING (ADL)
ADLS_ACUITY_SCORE: 20

## 2024-06-27 NOTE — ANESTHESIA POSTPROCEDURE EVALUATION
Patient: Sharri Elaine    Procedure: Procedure(s):  CHOLECYSTECTOMY, ROBOT-ASSISTED, LAPAROSCOPIC, USING DA MELY XI       Anesthesia Type:  General    Note:  Disposition: Outpatient   Postop Pain Control: Uneventful            Sign Out: Well controlled pain   PONV: No   Neuro/Psych: Uneventful            Sign Out: Acceptable/Baseline neuro status   Airway/Respiratory: Uneventful            Sign Out: Acceptable/Baseline resp. status   CV/Hemodynamics: Uneventful            Sign Out: Acceptable CV status; No obvious hypovolemia; No obvious fluid overload   Other NRE: NONE   DID A NON-ROUTINE EVENT OCCUR? No           Last vitals:  Vitals Value Taken Time   /73 06/27/24 1645   Temp 36.1  C (96.98  F) 06/27/24 1647   Pulse 87 06/27/24 1647   Resp 12 06/27/24 1647   SpO2 100 % 06/27/24 1647   Vitals shown include unfiled device data.    Electronically Signed By: MONICA Rubio CRNA  June 27, 2024  4:48 PM

## 2024-06-27 NOTE — OP NOTE
[facility]  Operative Note    Pre-operative diagnosis: Gall stones [K80.20]  Chronic calculus cholecystitis  History of choledocholithiasis   Post-operative diagnosis same   Procedure: Procedure(s):  CHOLECYSTECTOMY, ROBOT-ASSISTED, LAPAROSCOPIC, USING DA MELY XI  ICG  cholangiography   Surgeon: Christopher Varela MD   Assistants(s): Linda Martinez PA-C assistance was required for port placement, bedside robotic instrument exchanges, bedside needs for retraction and suction as needed, introduction/removal of sutures or mesh, and closure     Anesthesia: General    Estimated blood loss: Minimal                Drains: None     Specimens       ID Type Source Tests Collected by Time Destination   1 :  Tissue Gallbladder SURGICAL PATHOLOGY EXAM Christopher Varela MD 6/27/2024  2:28 PM       Implants: None   Findings: Large distended gallbladder with omental and prepyloric of stomach adherent to infundibulum.  Cystic duct very close proximity to CBD.  Had to aspirate 80cc of bilious fluid from gallbladder in order to grasped onto it.  .   Complications: None.   Condition: Stable       Indications for the procedure:   This is a 74-year-old F presented with RUQ achiness and history of choledocholithiasis.  No signs of acute cholecystitis thus was recommended that patient undergo a elective robotic cholecystectomy.  Risks, benefits, alternatives were explained to the patient.  She showed understanding wish to proceed.     Description of procedure:  Patient was probably identified in the preop holding area.  Patient was then brought back to the operative suite.  Placed in a supine position.  Anesthesia was induced per anesthesia protocol.  Both arms were tucked.  Patient was then prepped and draped in usual sterile fashion.  A timeout was then done to confirm the correct patient positioning and procedure.  Started the procedure local anesthetic was infiltrated at Escudero's point.  Utilizing a #11 blade  scalpel a stab incision was then made.  Utilizing a 5 mm Optiview trocar direct visualization was gained into the abdominal cavity.  Insufflation was then turned on.  3 additional robotic trocars were then placed under direct visualization.  One at the right subcostal.  1 infraumbilically.  1 at the left lower quadrant.  I then exchanged the 5 mm Optiview for a fourth robotic 8 mm trocar.   Inflation was then placed at the Escudero's point port.  Patient was then placed in reverse Trendelenburg and airplane to the left.  Instruments were then placed in direct visualization.  The right subcostal had the fenestrated bipolar.  The infraumbilical has the 30-degree camera.  The left lower quadrant with the hook cautery.  And the Escudero's point port was a Cadiere's.  The Cadiere was used to hold the gallbladder fundus cephalad and to the right shoulder after 80 ml of bilious fluid was aspirated from the fundus.  The forced bipolar was used to retract the infundibulum.  [place] dissection was initiated.  Cystic duct was clearly identified and skeletonized.  The cystic artery was then also skeletonized and identified.  Triangle of safety was confirmed.  ICG cholangiography. was turned on.  Further confirm the adequate anatomy.  Clips were then placed 1 proximally and 1 distally at the cystic duct.  Similar fashion was placed on the cystic artery.  Utilizing robotic Pott scissors, both were ligated.  The gallbladder was removed from the liver bed utilizing hook cautery.  Hemostasis was ensured.  A 5 mm bag was then used through the Escudero's point trocar, the gallbladder was removed after enlarging the incision.  Gallbladder specimen was then sent.  Final survey, noted hemostasis was achieved; no bile leakage was noted and confirmed with ICG cholangiography.  All instruments were then removed.  The robot was then undocked.  A [facility] with a Juan Diego-Ebenezer was tused to close the Escudero's point fascia trans fascially.   Patient tolerated the procedure well.  All counts were correct x2.  All of the trocars were removed.  Insufflation was then completely reduced.  The incisions were closed with 4-0 Monocryl in a simple erupted buried fashion.  Skin glue was then applied.                [facility] DO Varela FACOS    Disclaimer: This note consists of words and symbols derived from keyboarding and dictation using voice recognition software.  As a result, there may be errors that have gone undetected.  Please consider this when interpreting information found in this note.

## 2024-06-27 NOTE — ANESTHESIA CARE TRANSFER NOTE
Patient: Sharri Elaine    Procedure: Procedure(s):  CHOLECYSTECTOMY, ROBOT-ASSISTED, LAPAROSCOPIC, USING DA MELY XI       Diagnosis: Gall stones [K80.20]  Diagnosis Additional Information: No value filed.    Anesthesia Type:   General     Note:    Oropharynx: oropharynx clear of all foreign objects  Level of Consciousness: drowsy  Oxygen Supplementation: nasal cannula  Level of Supplemental Oxygen (L/min / FiO2): 3  Independent Airway: airway patency satisfactory and stable  Dentition: dentition unchanged  Vital Signs Stable: post-procedure vital signs reviewed and stable  Report to RN Given: handoff report given  Patient transferred to: PACU    Handoff Report: Identifed the Patient, Identified the Reponsible Provider, Reviewed the pertinent medical history, Discussed the surgical course, Reviewed Intra-OP anesthesia mangement and issues during anesthesia, Set expectations for post-procedure period and Allowed opportunity for questions and acknowledgement of understanding    Vitals:  Vitals Value Taken Time   /48 06/27/24 1539   Temp 36.1  C (96.98  F) 06/27/24 1542   Pulse 75 06/27/24 1542   Resp 19 06/27/24 1542   SpO2 97 % 06/27/24 1542   Vitals shown include unfiled device data.    Electronically Signed By: MONICA Rubio CRNA  June 27, 2024  3:43 PM

## 2024-06-27 NOTE — INTERVAL H&P NOTE
"I have reviewed the surgical (or preoperative) H&P that is linked to this encounter, and examined the patient. There are no significant changes    CCK hx of choledocholithiasis (pass spontaneously); ASA (heart stents 3x); no abd surgery    Clinical Conditions Present on Arrival:  Clinically Significant Risk Factors Present on Admission                 # Drug Induced Platelet Defect: home medication list includes an antiplatelet medication      # Obesity: Estimated body mass index is 35.61 kg/m  as calculated from the following:    Height as of this encounter: 1.6 m (5' 3\").    Weight as of this encounter: 91.2 kg (201 lb).       "

## 2024-06-27 NOTE — DISCHARGE INSTRUCTIONS
Home Care Following Gallbladder Surgery     [facility]    Pain meds:   600mg ibuprofen every 6hrs prn   650mg of acetaminophen every 6hrs prn  You can alternate these meds so that you take one every 3 hrs.    Make sure you do not go over 4000mg of acetaminophen every 24hrs, especially if you are taking Norco or percocet 5/325mg.    Care of the Incision:  Remove gauze dressing (if present) after 48 hours.  Leave small strips in place (if present).  They will gradually come off.  If surgical glue was used on your incision, keep it dry for 24 hours.  Then you may shower but don t submerge under water for at least 2 weeks.  Gently pat your incision dry with a freshly laundered towel.  Do not touch your incision with bare hands or pick at scabs.  Leave your incision open to air.  Cover it only if draining, clothing rubs or irritates it.    Activity:  Gradually increase your activity.  Walk short distances several times each day and increase the distance as your strength allows.  To promote circulation, do not cross your legs while sitting.  No strenuous lifting or straining for 2-3 weeks.  Do not lift anything over 10-20 pounds until your doctor approves an increase.  Return to work will be determined by the type of work you do and should be discussed with your physician.  Do not drive or operate equipment while taking prescription pain medicines.  You may drive 1 week after surgery if you have stopped taking prescription pain medicines and can react quickly enough to make an emergency stop if necessary.    Diet:  Return to the diet you were on before surgery.  Drink plenty of water.  Avoid foods that cause constipation.    REMEMBER--most prescription pain pills cause constipation.  Walking, extra fluids, and increased fiber (fresh fruits and vegetables, etc.) are natural remedies for constipation.  You can also take mineral oil, 1-2 Tablespoons per day.  If still constipated you may try a stool softener such as  Colace or Miralax.    Call Your Physician if You Have:  Redness, increased swelling or cloudy drainage from your incision.  A temperature of more than 101 degrees F.  Worsening pain in your incision not relieved by your prescription pain pills and/or a short rest.  Jaundice (yellowing of skin or eyes)  Abdominal distention (stomach getting very large)  Swelling in your legs  Productive cough  Burning with urination  Any questions or concerns about your recovery, please call [facility] at [phone]     Follow-up Care:  Make an appointment 1-2 week after your surgery.  Call [facility] at [phone]                            Same Day Surgery Discharge Instructions  Special Precautions After Surgery - Adult    It is not unusual to feel lightheaded or faint, up to 24 hours after surgery or while taking pain medication.  If you have these symptoms; sit for a few minutes before standing and have someone assist you when getting up.  You should rest and relax for the next 24 hours and must have someone stay with you for at least 24 hours after your discharge.  DO NOT DRIVE any vehicle or operate mechanical equipment for 24 hours following the end of your surgery.  DO NOT DRIVE while taking narcotic pain medications that have been prescribed by your physician.  If you had a limb operated on, you must be able to use it fully to drive.  DO NOT drink alcoholic beverages for 24 hours following surgery or while taking prescription pain medication.  Drink clear liquids (apple juice, ginger ale, broth, 7-Up, etc.).  Progress to your regular diet as you feel able.  Any questions call your physician and do not make important decisions for 24 hours.    Nausea and Vomiting: Nausea and vomiting can occur any time after receiving anesthesia. If you experience nausea and vomiting we encourage you to move to a clear liquid diet and advance your diet as tolerated. If nausea and vomiting do not improve  within 12 hours please call the surgeon or present to the Emergency department.     Break-through Bleeding: If your experience bleeding from your surgical site apply pressure and additional dressing per nurse instruction. For simple problems such as a saturated dressing, you may need to reinforce the dressing with more gauze and tape and put slight pressure on the site. If bleeding does not subside contact the surgeon or present to the Emergency Department.    Post-op Infection: If you develop a fever of 100.4 or greater, have pus like drainage, redness, swelling or severe pain at the surgical site not alleviated with pain medications; please contact the surgeon or present to the Emergency Department.     Medications:  Acetaminophen (Tylenol):  Next dose: 6:30PM.  Ibuprofen (Motrin, Advil):  Next dose: Start anytime.  Follow the instructions on the bottle.  __________________________________________________________________________________________________________________________________  IMPORTANT NUMBERS:    [facility] Main Number:  [phone], [phone]  Pharmacy:  [phone]  Same Day Surgery:  [phone], for general post-op questions call Monday - Thursday until 8:30 p.m., Fridays until 6:00 p.m.  Nurse Advice Line:  [phone]                                                                         [facility]:  [phone]

## 2024-06-30 ENCOUNTER — HEALTH MAINTENANCE LETTER (OUTPATIENT)
Age: 74
End: 2024-06-30

## 2024-07-02 LAB
PATH REPORT.COMMENTS IMP SPEC: NORMAL
PATH REPORT.COMMENTS IMP SPEC: NORMAL
PATH REPORT.FINAL DX SPEC: NORMAL
PATH REPORT.GROSS SPEC: NORMAL
PATH REPORT.MICROSCOPIC SPEC OTHER STN: NORMAL
PATH REPORT.RELEVANT HX SPEC: NORMAL
PHOTO IMAGE: NORMAL

## 2024-07-09 ENCOUNTER — OFFICE VISIT (OUTPATIENT)
Dept: SURGERY | Facility: CLINIC | Age: 74
End: 2024-07-09
Payer: COMMERCIAL

## 2024-07-09 VITALS
SYSTOLIC BLOOD PRESSURE: 155 MMHG | OXYGEN SATURATION: 97 % | DIASTOLIC BLOOD PRESSURE: 87 MMHG | BODY MASS INDEX: 35.44 KG/M2 | HEART RATE: 83 BPM | HEIGHT: 63 IN | WEIGHT: 200 LBS

## 2024-07-09 DIAGNOSIS — Z90.49 S/P CHOLECYSTECTOMY: Primary | ICD-10-CM

## 2024-07-09 PROCEDURE — 99024 POSTOP FOLLOW-UP VISIT: CPT | Performed by: PHYSICIAN ASSISTANT

## 2024-07-09 NOTE — LETTER
"7/9/2024      Sharri Elaine  [address] [address]      Dear Colleague,    Thank you for referring your patient, Sharri Elaine, to the [facility]. Please see a copy of my visit note below.    Surgery Post-op Note  [facility]  [address] [place] [address]  T: [phone]  F: [phone]    Subjective:     Sharri Elaine presents to the clinic after undergoing robot assisted cholecystectomy on 6/27/2024.  Patient is here for follow up. The patient reports left sided incisional pain.  Patient is currently tolerating a regular diet.  Patient states bowel habits  are normal . Patient denies significant nausea or vomiting. She is able to eat normally. She does have some tenderness to her left sided incision where gallbladder specimen was removed. She has no hernia. She has no incision dehiscence or breakdown.         Objective:     Vitals:   BP (!) 155/87   Pulse 83   Ht 1.6 m (5' 3\")   Wt 90.7 kg (200 lb)   LMP  (LMP Unknown)   SpO2 97%   BMI 35.43 kg/m     General Appearance:    Sharri is a well-appearing 74 year  female who is in no acute distress.   Cardiac:    Skin is warm and well perfused    Pulmonary:   Breathing is nonlabored on room air    Abdomen:    Soft, nontender, nondistended. No RUQ tenderness to palpation. No tenderness to percussion   Incision: The incision site is healing  well. There are no signs of cellulitis or drainage.        Labs/Imaging:     No new labs or imaging.        Pathology:     Final Diagnosis   A(1). Gallbladder, cholecystectomy:  -Chronic cholecystitis, and cholelithiasis.  -Negative for dysplasia or malignancy.        Assessment:     Ms.Pamela CESAR Elaine is status post laparoscopic robot assisted cholecystectomy,  doing well .        Plan:     1. The patient is instructed to call the office if there is any increasing incisional pain, swelling, redness, drainage, or any other problems.   2. " Follow up if any new or worsening symptoms, concerns or questions.   3. Use abdominal binder as needed to provide support to left sided incisions. Reassured patient that this incision has fascial closure and can be more uncomfortable due to this.        Again, thank you for allowing me to participate in the care of your patient.        Sincerely,        NANCY HARPER PA-C

## 2024-07-09 NOTE — NURSING NOTE
Chief Complaint   Patient presents with    Surgical Followup     Postop DOS 06/27       There were no vitals filed for this visit.  Wt Readings from Last 1 Encounters:   06/27/24 91.2 kg (201 lb)     Lauren Kramer MA

## 2024-07-18 NOTE — PROGRESS NOTES
"Surgery Post-op Note  [facility]  [address] [place] [address]  T: [phone]  F: [phone]    Subjective:     Sharri Elaine presents to the clinic after undergoing robot assisted cholecystectomy on 6/27/2024.  Patient is here for follow up. The patient reports left sided incisional pain.  Patient is currently tolerating a regular diet.  Patient states bowel habits  are normal . Patient denies significant nausea or vomiting. She is able to eat normally. She does have some tenderness to her left sided incision where gallbladder specimen was removed. She has no hernia. She has no incision dehiscence or breakdown.         Objective:     Vitals:   BP (!) 155/87   Pulse 83   Ht 1.6 m (5' 3\")   Wt 90.7 kg (200 lb)   LMP  (LMP Unknown)   SpO2 97%   BMI 35.43 kg/m     General Appearance:    Sharri is a well-appearing 74 year  female who is in no acute distress.   Cardiac:    Skin is warm and well perfused    Pulmonary:   Breathing is nonlabored on room air    Abdomen:    Soft, nontender, nondistended. No RUQ tenderness to palpation. No tenderness to percussion   Incision: The incision site is healing  well. There are no signs of cellulitis or drainage.        Labs/Imaging:     No new labs or imaging.        Pathology:     Final Diagnosis   A(1). Gallbladder, cholecystectomy:  -Chronic cholecystitis, and cholelithiasis.  -Negative for dysplasia or malignancy.        Assessment:     Ms.Pamela CESAR Elaine is status post laparoscopic robot assisted cholecystectomy,  doing well .        Plan:     1. The patient is instructed to call the office if there is any increasing incisional pain, swelling, redness, drainage, or any other problems.   2. Follow up if any new or worsening symptoms, concerns or questions.   3. Use abdominal binder as needed to provide support to left sided incisions. Reassured patient that this incision has fascial closure and can be more uncomfortable due to " this.

## 2024-07-19 DIAGNOSIS — E78.5 HYPERLIPIDEMIA WITH TARGET LDL LESS THAN 100: ICD-10-CM

## 2024-07-19 DIAGNOSIS — I10 HYPERTENSION GOAL BP (BLOOD PRESSURE) < 140/90: ICD-10-CM

## 2024-07-21 RX ORDER — LOSARTAN POTASSIUM 50 MG/1
50 TABLET ORAL 2 TIMES DAILY
Qty: 180 TABLET | Refills: 0 | Status: SHIPPED | OUTPATIENT
Start: 2024-07-21

## 2024-07-22 NOTE — TELEPHONE ENCOUNTER
losartan (COZAAR) 50 MG tablet 180 tablet 2 10/23/2023     Last Office Visit: 7/27/23  Future Office visit:   8/1/24      Angiotensin-II Receptors [id number]/2024 12:07 AM   Protocol Details Last blood pressure under 140/90 in past 12 months        BP Readings from Last 3 Encounters:   07/09/24 (!) 155/87   06/27/24 (!) 154/76   06/14/24 134/70   REVIEWED-ELEVATED DURING SURGERY AND POST OP VISIT    90 day maria luisa refill sent to the pharmacy. ELEVATED BP READINGS per the refill protocol     Rebecca Reyes RN  P Red Flag Triage/MRT

## 2024-08-01 ENCOUNTER — MYC REFILL (OUTPATIENT)
Dept: CARDIOLOGY | Facility: CLINIC | Age: 74
End: 2024-08-01

## 2024-08-01 ENCOUNTER — OFFICE VISIT (OUTPATIENT)
Dept: CARDIOLOGY | Facility: CLINIC | Age: 74
End: 2024-08-01
Payer: COMMERCIAL

## 2024-08-01 VITALS
DIASTOLIC BLOOD PRESSURE: 75 MMHG | OXYGEN SATURATION: 96 % | SYSTOLIC BLOOD PRESSURE: 137 MMHG | BODY MASS INDEX: 35.78 KG/M2 | WEIGHT: 202 LBS | HEART RATE: 86 BPM

## 2024-08-01 DIAGNOSIS — E78.5 HYPERLIPIDEMIA WITH TARGET LDL LESS THAN 100: ICD-10-CM

## 2024-08-01 DIAGNOSIS — Z98.61 STATUS POST PERCUTANEOUS TRANSLUMINAL CORONARY ANGIOPLASTY: ICD-10-CM

## 2024-08-01 DIAGNOSIS — I10 HYPERTENSION GOAL BP (BLOOD PRESSURE) < 140/90: ICD-10-CM

## 2024-08-01 PROCEDURE — 99213 OFFICE O/P EST LOW 20 MIN: CPT | Mod: 24 | Performed by: INTERNAL MEDICINE

## 2024-08-01 NOTE — PROGRESS NOTES
SUBJECTIVE:  Sharri Elaine is a 74 year old female who presents for yearly follow-up.  Coronary artery disease.     Patient had proximal LAD stent at [facility] in 4/2010.  Had in-stent restenosis and had repeat stenting in 11/3/2010.  Since then patient had intermittent chest pain.  In 2013 patient had a exercise stress echo.  This was reported as showing inferior wall ischemia.  Subsequently patient had a coronary angiogram which showed no flow-limiting lesions.  30 to 40% stenosis involving all 3 vessels.  There was 30% in-stent restenosis.   Patient's cardiac risk factors are hypertension and hyperlipidemia.  No diabetes.  Due to chest pain patient had an admission and an exercise stress echo in 2019 which was normal.  During previous visit patient complained of chest pain and had a Lexiscan on 11/17/2021 which was completely normal.   Recently patient had a CT scan of her chest which showed distended gallbladder with multiple stones and cholestasis.  Had cholecystectomy.  Since then patient is feeling much better no more chest pain.  Today patient had no cardiac complaints.  Patient Active Problem List    Diagnosis Date Noted    Class 2 severe obesity with body mass index (BMI) of 35 to 39.9 with serious comorbidity (H) 05/20/2024     Priority: Medium    Chest pain 05/12/2016     Priority: Medium    Acute chest pain 05/12/2016     Priority: Medium    Rosacea 08/20/2010     Priority: Medium    Hypertension goal BP (blood pressure) < 140/90 07/14/2010     Priority: Medium     Goal <130/80      Hyperlipidemia with target LDL less than 100 07/14/2010     Priority: Medium     January 4, 2011 - . I have asked this patient to obtain a fasting lipid profile tomorrow in Ghada Bermeo's office. She is complaining of some muscle weakness and/or achiness, especially in her legs. Perhaps simvastatin at 40 mg will need to be changed to something more potent such as Crestor. If she is having myalgias or myopathy, a  change to a water soluble statin would be in order.   Diagnosis updated by automated process. Provider to review and confirm.      CAD (coronary artery disease)      Priority: Medium     Status post LAD stenting 06/28 for unstable angina. She had in-stent restenosis in the LAD with further stenting on 11/03/2010 when she developed recurrent angina and was hospitalized at [facility]. Coronary angiography demonstrated a 60% circumflex ostial stenosis, a 95% proximal LAD in-stent restenosis along with a 50% ostial stenosis that was unchanged. The right coronary artery had a mild 10%-20% luminal narrowing but nothing significant. Her ejection fraction at that time was 45%. The in-stent restenosis was restented with an [place] drug-eluting stent.   Plavix was changed to Effient and the cardiologist, Dr. Torrez, felt that Effient should be continued at least 2 years or preferentially longer term. Nuclear stress test late summer 2011. We will consider a stress test earlier than that should the patient have symptoms.    Follow up with Cardiology in 6 months       Preinfarction syndrome (H)      Priority: Medium     (Problem list name updated by automated process. Provider to review and confirm.)      .  Current Outpatient Medications   Medication Sig Dispense Refill    amLODIPine (NORVASC) 10 MG tablet Take 1 tablet (10 mg) by mouth daily 90 tablet 0    aspirin 81 MG EC tablet Take 81 mg by mouth daily      atorvastatin (LIPITOR) 40 MG tablet TAKE 1 TABLET DAILY 90 tablet 3    Calcium-Vitamin D (CALTRATE 600 PLUS-VIT D OR)       diphenhydrAMINE-acetaminophen (TYLENOL PM)  MG tablet Take 1 tablet by mouth at bedtime      esomeprazole (NEXIUM) 20 MG DR capsule Take 20 mg by mouth every morning (before breakfast) Take 30-60 minutes before eating.      losartan (COZAAR) 50 MG tablet Take 1 tablet (50 mg) by mouth 2 times daily 180 tablet 0    nitroGLYcerin (NITROSTAT) 0.4 MG sublingual tablet Place 1  tablet (0.4 mg) under the tongue every 5 minutes as needed for chest pain 25 tablet 1    ORDER FOR DME Blood pressure cuff 1 Device 0    oxyCODONE (ROXICODONE) 5 MG tablet Take 1 tablet (5 mg) by mouth every 6 hours as needed for severe pain 12 tablet 0     No current facility-administered medications for this visit.     Past Medical History:   Diagnosis Date    Acne rosacea     Anginas, unstable 2010    CAD (coronary artery disease) 2010    LAD stent x 2    Fibrocystic breast     Hepatitis     age 18    Iritis     14 YEARS OLD    Menopause     AN HRT    MI (myocardial infarction) (H)     non T wave MI    NONSPECIFIC MEDICAL HISTORY 07/14/2009    HEART MURMUR- NORMAL ECHO    Shingles 05/2017     Past Surgical History:   Procedure Laterality Date    ANGIOGRAM  6/2010    angiogram  11/3/2010    in stent restenosis    LAPAROSCOPIC CHOLECYSTECTOMY N/A 6/27/2024    Procedure: CHOLECYSTECTOMY, ROBOT-ASSISTED, LAPAROSCOPIC, USING DA MELY XI;  Surgeon: Christopher Varela MD;  Location: [facility] OR    PVD STENTING  2010     x 2, LAD     Allergies   Allergen Reactions    Carvedilol Nausea    Lisinopril Cough    Sulfa Antibiotics Rash     Social History     Socioeconomic History    Marital status:      Spouse name: Not on file    Number of children: Not on file    Years of education: Not on file    Highest education level: Not on file   Occupational History    Not on file   Tobacco Use    Smoking status: Never    Smokeless tobacco: Never    Tobacco comments:     never smoker; non-smoking household   Vaping Use    Vaping status: Never Used   Substance and Sexual Activity    Alcohol use: Yes     Comment: 1 glass of wine almost every day    Drug use: No     Comment: never    Sexual activity: Yes     Partners: Male   Other Topics Concern    Parent/sibling w/ CABG, MI or angioplasty before 65F 55M? No   Social History Narrative    Not on file     Social Determinants of Health     Financial Resource Strain: Not on file   Food  Insecurity: Not on file   Transportation Needs: Not on file   Physical Activity: Not on file   Stress: Not on file   Social Connections: Unknown (11/17/2022)    [facility], [employer] [facility]    Social Connections     Frequency of Communication with Friends and Family: Not on file   Interpersonal Safety: Low Risk  (5/20/2024)    Interpersonal Safety     Do you feel physically and emotionally safe where you currently live?: Yes     Within the past 12 months, have you been hit, slapped, kicked or otherwise physically hurt by someone?: No     Within the past 12 months, have you been humiliated or emotionally abused in other ways by your partner or ex-partner?: No   Housing Stability: Not on file     Family History   Problem Relation Age of Onset    Hypertension Mother     Heart Disease Mother         CHF    Alzheimer Disease Father     Hypertension Daughter     High cholesterol Daughter     Breast Cancer No family hx of           REVIEW OF SYSTEMS:  General: negative, fever, chills, night sweats  Skin: negative, acne, rash, and scaling  Eyes: negative, double vision, eye pain, and photophobia  Ears/Nose/Throat: negative, nasal congestion, and purulent rhinorrhea  Respiratory: No dyspnea on exertion, No cough, No hemoptysis, and negative  Cardiovascular: negative, palpitations, tachycardia, irregular heart beat, chest pain, exertional chest pain or pressure, paroxysmal nocturnal dyspnea, dyspnea on exertion, and orthopnea       OBJECTIVE:  Blood pressure 137/75, pulse 86, weight 91.6 kg (202 lb), SpO2 96%, not currently breastfeeding.  General Appearance: healthy, alert, active, and no distress  Head: Normocephalic. No masses, lesions, tenderness or abnormalities  Eyes: conjuctiva clear, PERRL, EOM intact  Ears: External ears normal. Canals clear. TM's normal.  Nose: Nares normal  Mouth: normal  Neck: Supple, no cervical adenopathy, no  thyromegaly  Lungs: clear to auscultation  Cardiac: regular rate and rhythm, normal S1 and S2, no murmur       ASSESSMENT/PLAN:  Patient here for yearly follow-up.  Status post LAD stent in April 2010..  In number of 2010 patient had repeat angiogram and stent for the same lesion for in-stent restenosis.  Since then patient had another coronary angiogram which showed known flow-limiting lesions and 20 to 30% in-stent restenosis.  Patient used to have recurrent chest pain and admission.  Had multiple multiple stress test including a Lexiscan in 2021 which were all normal.  Recently patient had a CT scan of chest which showed distended gallbladder with multiple gallstones and cholestasis.  Subsequently patient had cholecystectomy.  Now patient has no chest pain and she is feeling much better.  Had no cardiac complaints today.  Her blood pressure is well-controlled so is her lipid profile.  Overall patient is doing very well.  Current cardiac medications are losartan 50 mg daily, amlodipine 10 mg daily, atorvastatin 40 mg daily and aspirin 81 mg daily.  Patient is known to have normal LV function and no regional wall motion abnormalities.  Recent EKG shows normal sinus rhythm normal EKG.  Patient is advised to continue her current medications and return to clinic in 1 year for a follow-up.  Total visit duration 20 minutes.  This included face-to-face interview, physical exam, chart review, review of recent echocardiogram EKG prior coronary angiogram and documentation.

## 2024-08-01 NOTE — PATIENT INSTRUCTIONS
Thank you for coming to the [facility] @ [facility]; please note the following instructions:    1. Cardiology Providers: Dr. DAVIDE Torres would like you to return for a cardiac follow up in 1 year  (August).  We will contact you regarding your appointment when the time draws closer or you may call [phone] option #1 to arrange an appointment.  Mean while, if you should have any questions or concerns regarding your heart health, please contact us.  Thank you for choosing [facility] for your care.         If you have any questions regarding your visit please contact your care team:     Cardiology  Telephone Number   Tamiko LORENZO., RN  Bianka FREDERICK, RN  Linda HEADLEY, RN  Loraine GRAY, RMA  Carla CANO, SUZIE STYLES, Visit Facilitator  Hoa PRECIADO [phone] (option 1)   For scheduling appts:     [phone] (select option 1)       For the Device Clinic (Pacemakers and ICD's)  RN's :  Nae Hodge   During business hours: [phone]    *After business hours:  [phone] (select option 4)      Normal test result notifications will be released via [employer] or mailed within 7 business days.  All other test results, will be communicated via telephone once reviewed by your cardiologist.    If you need a medication refill please contact your pharmacy.  Please allow 3 business days for your refill to be completed.    As always, thank you for trusting us with your health care needs!

## 2024-08-01 NOTE — LETTER
8/1/2024      RE: Sharri Elaine  [id number] [id number]       Dear Colleague,    Thank you for the opportunity to participate in the care of your patient, Sharri Elaine, at the [facility]IC [facility]. Please see a copy of my visit note below.       SUBJECTIVE:  Sharri Elaine is a 74 year old female who presents for yearly follow-up.  Coronary artery disease.     Patient had proximal LAD stent at [facility] in 4/2010.  Had in-stent restenosis and had repeat stenting in 11/3/2010.  Since then patient had intermittent chest pain.  In 2013 patient had a exercise stress echo.  This was reported as showing inferior wall ischemia.  Subsequently patient had a coronary angiogram which showed no flow-limiting lesions.  30 to 40% stenosis involving all 3 vessels.  There was 30% in-stent restenosis.   Patient's cardiac risk factors are hypertension and hyperlipidemia.  No diabetes.  Due to chest pain patient had an admission and an exercise stress echo in 2019 which was normal.  During previous visit patient complained of chest pain and had a Lexiscan on 11/17/2021 which was completely normal.   Recently patient had a CT scan of her chest which showed distended gallbladder with multiple stones and cholestasis.  Had cholecystectomy.  Since then patient is feeling much better no more chest pain.  Today patient had no cardiac complaints.  Patient Active Problem List    Diagnosis Date Noted     Class 2 severe obesity with body mass index (BMI) of 35 to 39.9 with serious comorbidity (H) 05/20/2024     Priority: Medium     Chest pain 05/12/2016     Priority: Medium     Acute chest pain 05/12/2016     Priority: Medium     Rosacea 08/20/2010     Priority: Medium     Hypertension goal BP (blood pressure) < 140/90 07/14/2010     Priority: Medium     Goal <130/80       Hyperlipidemia with target LDL less than 100 07/14/2010      Priority: Medium     January 4, 2011 - . I have asked this patient to obtain a fasting lipid profile tomorrow in Ghada Bermeo's office. She is complaining of some muscle weakness and/or achiness, especially in her legs. Perhaps simvastatin at 40 mg will need to be changed to something more potent such as Crestor. If she is having myalgias or myopathy, a change to a water soluble statin would be in order.   Diagnosis updated by automated process. Provider to review and confirm.       CAD (coronary artery disease)      Priority: Medium     Status post LAD stenting 06/28 for unstable angina. She had in-stent restenosis in the LAD with further stenting on 11/03/2010 when she developed recurrent angina and was hospitalized at [facility]. Coronary angiography demonstrated a 60% circumflex ostial stenosis, a 95% proximal LAD in-stent restenosis along with a 50% ostial stenosis that was unchanged. The right coronary artery had a mild 10%-20% luminal narrowing but nothing significant. Her ejection fraction at that time was 45%. The in-stent restenosis was restented with an [place] drug-eluting stent.   Plavix was changed to Effient and the cardiologist, Dr. Torrez, felt that Effient should be continued at least 2 years or preferentially longer term. Nuclear stress test late summer 2011. We will consider a stress test earlier than that should the patient have symptoms.    Follow up with Cardiology in 6 months        Preinfarction syndrome (H)      Priority: Medium     (Problem list name updated by automated process. Provider to review and confirm.)      .  Current Outpatient Medications   Medication Sig Dispense Refill     amLODIPine (NORVASC) 10 MG tablet Take 1 tablet (10 mg) by mouth daily 90 tablet 0     aspirin 81 MG EC tablet Take 81 mg by mouth daily       atorvastatin (LIPITOR) 40 MG tablet TAKE 1 TABLET DAILY 90 tablet 3     Calcium-Vitamin D (CALTRATE 600 PLUS-VIT D OR)         diphenhydrAMINE-acetaminophen (TYLENOL PM)  MG tablet Take 1 tablet by mouth at bedtime       esomeprazole (NEXIUM) 20 MG DR capsule Take 20 mg by mouth every morning (before breakfast) Take 30-60 minutes before eating.       losartan (COZAAR) 50 MG tablet Take 1 tablet (50 mg) by mouth 2 times daily 180 tablet 0     nitroGLYcerin (NITROSTAT) 0.4 MG sublingual tablet Place 1 tablet (0.4 mg) under the tongue every 5 minutes as needed for chest pain 25 tablet 1     ORDER FOR DME Blood pressure cuff 1 Device 0     oxyCODONE (ROXICODONE) 5 MG tablet Take 1 tablet (5 mg) by mouth every 6 hours as needed for severe pain 12 tablet 0     No current facility-administered medications for this visit.     Past Medical History:   Diagnosis Date     Acne rosacea      Anginas, unstable 2010     CAD (coronary artery disease) 2010    LAD stent x 2     Fibrocystic breast      Hepatitis     age 18     Iritis     14 YEARS OLD     Menopause     AN HRT     MI (myocardial infarction) (H)     non T wave MI     NONSPECIFIC MEDICAL HISTORY 07/14/2009    HEART MURMUR- NORMAL ECHO     Shingles 05/2017     Past Surgical History:   Procedure Laterality Date     ANGIOGRAM  6/2010     angiogram  11/3/2010    in stent restenosis     LAPAROSCOPIC CHOLECYSTECTOMY N/A 6/27/2024    Procedure: CHOLECYSTECTOMY, ROBOT-ASSISTED, LAPAROSCOPIC, USING DA MELY XI;  Surgeon: Christopher Varela MD;  Location: [facility] OR     PVD STENTING  2010     x 2, LAD     Allergies   Allergen Reactions     Carvedilol Nausea     Lisinopril Cough     Sulfa Antibiotics Rash     Social History     Socioeconomic History     Marital status:      Spouse name: Not on file     Number of children: Not on file     Years of education: Not on file     Highest education level: Not on file   Occupational History     Not on file   Tobacco Use     Smoking status: Never     Smokeless tobacco: Never     Tobacco comments:     never smoker; non-smoking household   Vaping Use     Vaping  status: Never Used   Substance and Sexual Activity     Alcohol use: Yes     Comment: 1 glass of wine almost every day     Drug use: No     Comment: never     Sexual activity: Yes     Partners: Male   Other Topics Concern     Parent/sibling w/ CABG, MI or angioplasty before 65F 55M? No   Social History Narrative     Not on file     Social Determinants of Health     Financial Resource Strain: Not on file   Food Insecurity: Not on file   Transportation Needs: Not on file   Physical Activity: Not on file   Stress: Not on file   Social Connections: Unknown (11/17/2022)    [facility] [employer] [facility]    Social Connections      Frequency of Communication with Friends and Family: Not on file   Interpersonal Safety: Low Risk  (5/20/2024)    Interpersonal Safety      Do you feel physically and emotionally safe where you currently live?: Yes      Within the past 12 months, have you been hit, slapped, kicked or otherwise physically hurt by someone?: No      Within the past 12 months, have you been humiliated or emotionally abused in other ways by your partner or ex-partner?: No   Housing Stability: Not on file     Family History   Problem Relation Age of Onset     Hypertension Mother      Heart Disease Mother         CHF     Alzheimer Disease Father      Hypertension Daughter      High cholesterol Daughter      Breast Cancer No family hx of           REVIEW OF SYSTEMS:  General: negative, fever, chills, night sweats  Skin: negative, acne, rash, and scaling  Eyes: negative, double vision, eye pain, and photophobia  Ears/Nose/Throat: negative, nasal congestion, and purulent rhinorrhea  Respiratory: No dyspnea on exertion, No cough, No hemoptysis, and negative  Cardiovascular: negative, palpitations, tachycardia, irregular heart beat, chest pain, exertional chest pain or pressure, paroxysmal nocturnal dyspnea, dyspnea on exertion, and orthopnea        OBJECTIVE:  Blood pressure 137/75, pulse 86, weight 91.6 kg (202 lb), SpO2 96%, not currently breastfeeding.  General Appearance: healthy, alert, active, and no distress  Head: Normocephalic. No masses, lesions, tenderness or abnormalities  Eyes: conjuctiva clear, PERRL, EOM intact  Ears: External ears normal. Canals clear. TM's normal.  Nose: Nares normal  Mouth: normal  Neck: Supple, no cervical adenopathy, no thyromegaly  Lungs: clear to auscultation  Cardiac: regular rate and rhythm, normal S1 and S2, no murmur       ASSESSMENT/PLAN:  Patient here for yearly follow-up.  Status post LAD stent in April 2010..  In number of 2010 patient had repeat angiogram and stent for the same lesion for in-stent restenosis.  Since then patient had another coronary angiogram which showed known flow-limiting lesions and 20 to 30% in-stent restenosis.  Patient used to have recurrent chest pain and admission.  Had multiple multiple stress test including a Lexiscan in 2021 which were all normal.  Recently patient had a CT scan of chest which showed distended gallbladder with multiple gallstones and cholestasis.  Subsequently patient had cholecystectomy.  Now patient has no chest pain and she is feeling much better.  Had no cardiac complaints today.  Her blood pressure is well-controlled so is her lipid profile.  Overall patient is doing very well.  Current cardiac medications are losartan 50 mg daily, amlodipine 10 mg daily, atorvastatin 40 mg daily and aspirin 81 mg daily.  Patient is known to have normal LV function and no regional wall motion abnormalities.  Recent EKG shows normal sinus rhythm normal EKG.  Patient is advised to continue her current medications and return to clinic in 1 year for a follow-up.  Total visit duration 20 minutes.  This included face-to-face interview, physical exam, chart review, review of recent echocardiogram EKG prior coronary angiogram and documentation.      Please do not hesitate to contact me  if you have any questions/concerns.     Sincerely,     LUCA Gillespie MD

## 2024-08-01 NOTE — NURSING NOTE
"Chief Complaint   Patient presents with    RECHECK     Return general cardiology for annual follow-up       Initial /75 (BP Location: Left arm, Patient Position: Chair, Cuff Size: Adult Large)   Pulse 86   Wt 91.6 kg (202 lb)   LMP  (LMP Unknown)   SpO2 96%   BMI 35.78 kg/m   Estimated body mass index is 35.78 kg/m  as calculated from the following:    Height as of 7/9/24: 1.6 m (5' 3\").    Weight as of this encounter: 91.6 kg (202 lb)..  BP completed using cuff size: SUZIE Christine    "

## 2024-08-05 RX ORDER — AMLODIPINE BESYLATE 10 MG/1
10 TABLET ORAL DAILY
Qty: 90 TABLET | Refills: 3 | Status: SHIPPED | OUTPATIENT
Start: 2024-08-05

## 2024-08-05 NOTE — TELEPHONE ENCOUNTER
Last Clinic Visit: 8/1/2024 [facility]    amLODIPine (NORVASC) 10 MG tablet: passed medication protocol  - refills sent  - message sent to patient

## 2024-10-02 ENCOUNTER — OFFICE VISIT (OUTPATIENT)
Dept: FAMILY MEDICINE | Facility: CLINIC | Age: 74
End: 2024-10-02
Payer: COMMERCIAL

## 2024-10-02 VITALS
HEART RATE: 92 BPM | OXYGEN SATURATION: 97 % | DIASTOLIC BLOOD PRESSURE: 86 MMHG | WEIGHT: 202 LBS | RESPIRATION RATE: 16 BRPM | BODY MASS INDEX: 35.79 KG/M2 | SYSTOLIC BLOOD PRESSURE: 169 MMHG | HEIGHT: 63 IN | TEMPERATURE: 98.1 F

## 2024-10-02 DIAGNOSIS — L57.0 ACTINIC KERATOSIS: ICD-10-CM

## 2024-10-02 DIAGNOSIS — N93.9 VAGINAL BLEEDING: ICD-10-CM

## 2024-10-02 DIAGNOSIS — N94.9 VAGINAL LUMP: Primary | ICD-10-CM

## 2024-10-02 PROCEDURE — 90662 IIV NO PRSV INCREASED AG IM: CPT | Performed by: FAMILY MEDICINE

## 2024-10-02 PROCEDURE — 99213 OFFICE O/P EST LOW 20 MIN: CPT | Mod: 25 | Performed by: FAMILY MEDICINE

## 2024-10-02 PROCEDURE — G0008 ADMIN INFLUENZA VIRUS VAC: HCPCS | Performed by: FAMILY MEDICINE

## 2024-10-02 PROCEDURE — 90480 ADMN SARSCOV2 VAC 1/ONLY CMP: CPT | Performed by: FAMILY MEDICINE

## 2024-10-02 PROCEDURE — 17110 DESTRUCTION B9 LES UP TO 14: CPT | Performed by: FAMILY MEDICINE

## 2024-10-02 PROCEDURE — 91320 SARSCV2 VAC 30MCG TRS-SUC IM: CPT | Performed by: FAMILY MEDICINE

## 2024-10-02 ASSESSMENT — PAIN SCALES - GENERAL: PAINLEVEL: NO PAIN (0)

## 2024-10-02 NOTE — PROGRESS NOTES
Sharri was seen today for vaginal bleeding, derm problem and imm/inj.    Diagnoses and all orders for this visit:    Vaginal lump  -     Ob/Gyn  Referral; Future  -     US Pelvic Transabdominal and Transvaginal; Future  -     74 yr old female with vaginal bleeding. Recommend pelvic US.     Vaginal bleeding  -     US Pelvic Transabdominal and Transvaginal; Future    Actinic keratosis  -     DESTRUCT BENIGN LESION, UP TO 14  -     Applied liquid nitrogen to lesion  -     Patient given some instructions about care.     Other orders  -     INFLUENZA HIGH DOSE, TRIVALENT, PF (FLUZONE)  -     COVID-19 12+ (PFIZER)  -     UA Macroscopic with reflex to Microscopic and Culture - Clinic Collect           Subjective   Sharri is a 74 year old, presenting for the following health issues:    Patient is a 74 yr old female here for vaginal bleeding. This has been going on intermittently for the last month. She says she notices the blood on wiping, she has not needed to use a pad or panty liner. She reports that she has had no abdominal or pelvic cramping.   Patient reports that she has no urinary symptoms. No systemic symptoms.   She also has a skin spot on her right temple which she wants looked at. It has not bled and no discharge from it.     Vaginal Bleeding (Has been having vaginal spotting at times for more than a month.  It can be for a few days then will go away and start again.  There is not abdominal or pelvic pain noted.  No issues with spotting prior to this.), Derm Problem (Has a skin area on the right side of the face to check.  There is no itching or bleeding, it is not getting larger.  It will scab over then peel off.  Can be sensitive when peeling off.  Has been there for about 6 months.  No personal history of skin cancer.  Her mother did have a biopsy of skin issue on her leg.  Patient is unaware if that was cancer or not.), and Imm/Inj (She will update her Flu and Covid today.  Mentioned about  updating Tdap through her Pharmacy for coverage. )        10/2/2024    12:46 PM   Additional Questions   Roomed by Mojgan Casas CMA     Chief Complaint   Patient presents with    Vaginal Bleeding     Has been having vaginal spotting at times for more than a month.  It can be for a few days then will go away and start again.  There is not abdominal or pelvic pain noted.  No issues with spotting prior to this.    Derm Problem     Has a skin area on the right side of the face to check.  There is no itching or bleeding, it is not getting larger.  It will scab over then peel off.  Can be sensitive when peeling off.  Has been there for about 6 months.  No personal history of skin cancer.  Her mother did have a biopsy of skin issue on her leg.  Patient is unaware if that was cancer or not.    Imm/Inj     She will update her Flu and Covid today.  Mentioned about updating Tdap through her Pharmacy for coverage.        History of Present Illness       Reason for visit:  Vaginal spotting  Symptom onset:  More than a month  Symptoms include:  Spotting  Symptom intensity:  Mild  Symptom progression:  Staying the same  Had these symptoms before:  Yes  Has tried/received treatment for these symptoms:  No  What makes it worse:  No  What makes it better:  No   She is taking medications regularly.             Review of Systems  CONSTITUTIONAL: NEGATIVE for fever, chills, change in weight  INTEGUMENTARY/SKIN: POSITIVE for rash face  ENT/MOUTH: NEGATIVE for ear, mouth and throat problems  RESP: NEGATIVE for significant cough or SOB  CV: NEGATIVE for chest pain, palpitations or peripheral edema  : vaginal bleeding.  MUSCULOSKELETAL: NEGATIVE for significant arthralgias or myalgia  NEURO: NEGATIVE for weakness, dizziness or paresthesias  ENDOCRINE: NEGATIVE for temperature intolerance, skin/hair changes  HEME/ALLERGY/IMMUNE: NEGATIVE for bleeding problems  PSYCHIATRIC: NEGATIVE for changes in mood or affect      Objective    BP (!)  "169/86   Pulse 92   Temp 98.1  F (36.7  C) (Tympanic)   Resp 16   Ht 1.6 m (5' 3\")   Wt 91.6 kg (202 lb)   LMP  (LMP Unknown)   SpO2 97%   BMI 35.78 kg/m    Body mass index is 35.78 kg/m .  Physical Exam   GENERAL: alert and no distress  EYES: Eyes grossly normal to inspection, PERRL and conjunctivae and sclerae normal  HENT: ear canals and TM's normal, nose and mouth without ulcers or lesions  NECK: no adenopathy, no asymmetry, masses, or scars  RESP: lungs clear to auscultation - no rales, rhonchi or wheezes  CV: regular rate and rhythm, normal S1 S2, no S3 or S4, no murmur, click or rub, no peripheral edema   (female): normal female external genitalia and fleshy mass seen on introitus  MS: no gross musculoskeletal defects noted, no edema            Signed Electronically by: Demi Martínez MD    "

## 2024-10-15 ENCOUNTER — HOSPITAL ENCOUNTER (OUTPATIENT)
Dept: ULTRASOUND IMAGING | Facility: CLINIC | Age: 74
Discharge: HOME | End: 2024-10-15
Attending: FAMILY MEDICINE | Admitting: FAMILY MEDICINE
Payer: MEDICARE

## 2024-10-15 DIAGNOSIS — N93.9 VAGINAL BLEEDING: ICD-10-CM

## 2024-10-15 DIAGNOSIS — N94.9 VAGINAL LUMP: ICD-10-CM

## 2024-10-15 PROCEDURE — 76856 US EXAM PELVIC COMPLETE: CPT

## 2024-10-17 DIAGNOSIS — E78.5 HYPERLIPIDEMIA WITH TARGET LDL LESS THAN 100: ICD-10-CM

## 2024-10-17 DIAGNOSIS — I10 HYPERTENSION GOAL BP (BLOOD PRESSURE) < 140/90: ICD-10-CM

## 2024-10-23 NOTE — TELEPHONE ENCOUNTER
Medication Requested:  losartan (COZAAR) 50 MG tablet 180 tablet 0 7/21/2024 -- No   Sig - Route: Take 1 tablet (50 mg) by mouth 2 times daily - Oral     ----------------------  Last Office Visit : 8/1/2024  [facility]      Future Office visit:  0  ----------------------          [x]Medication unable to be refilled by RN due to criteria not met as indicated below:   BP >140/90, pended 90 days and FYI to care team per protocol         [] Eligibility - Pt not seen within past _ months, no future appointment       []Supervision: no future appointment; >30 days before next appointment       [] Compliance - lapse in therapy/gap in refills; No Shows; Cancellations       [] Verification - order discrepancy, clarification needed, modification needed       [] Controlled medication -        [] Medication not included in refill protocol policy       [x] Abnormal labs/test: BP >140/90, pended 90 days and FYI to care team per protocol       [] Overdue labs/test:         [] 90 day maria luisa refill given with reminder to complete:         [] Maria Luisa refill given x1, Pt did not follow-up, pended to care team for decision       [] Medication not active on Pt's med list       [] Drug interaction Warning       [] Medication is Reported/Historical       [] > 30-day supply request       []Advanced refill request: > 7 days before refill date       []Review: new med; med adjusted <= 30 days; safety alert; requires lab monitoring       []Other:    -Overdue for RTC, needs appointment scheduled for refills.   -Scheduling: Please contact the pt for appointment, thanks!    -Scheduling has been notified to contact the pt for appointment.  -Please order the following labs:    Pass/Fail Protocol Criteria:    Angiotensin-II Receptors Gykywb83/17/2024 12:06 AM   Protocol Details Most recent blood pressure under 140/90 in past 12 months        BP Readings from Last 3 Encounters:   10/02/24 (!) 169/86   08/01/24 137/75    07/09/24 (!) 155/87

## 2024-10-24 ENCOUNTER — OFFICE VISIT (OUTPATIENT)
Dept: OBGYN | Facility: CLINIC | Age: 74
End: 2024-10-24
Payer: COMMERCIAL

## 2024-10-24 VITALS
HEIGHT: 63 IN | SYSTOLIC BLOOD PRESSURE: 154 MMHG | TEMPERATURE: 98.3 F | HEART RATE: 95 BPM | DIASTOLIC BLOOD PRESSURE: 73 MMHG | BODY MASS INDEX: 35.79 KG/M2 | WEIGHT: 202 LBS | RESPIRATION RATE: 18 BRPM

## 2024-10-24 DIAGNOSIS — N95.0 POST-MENOPAUSAL BLEEDING: Primary | ICD-10-CM

## 2024-10-24 PROCEDURE — 88305 TISSUE EXAM BY PATHOLOGIST: CPT | Performed by: PATHOLOGY

## 2024-10-24 PROCEDURE — 58100 BIOPSY OF UTERUS LINING: CPT | Performed by: STUDENT IN AN ORGANIZED HEALTH CARE EDUCATION/TRAINING PROGRAM

## 2024-10-24 PROCEDURE — 99203 OFFICE O/P NEW LOW 30 MIN: CPT | Mod: CA | Performed by: STUDENT IN AN ORGANIZED HEALTH CARE EDUCATION/TRAINING PROGRAM

## 2024-10-24 NOTE — NURSING NOTE
"Initial BP (!) 154/73 (BP Location: Right arm, Patient Position: Chair, Cuff Size: Adult Regular)   Pulse 95   Temp 98.3  F (36.8  C) (Tympanic)   Resp 18   Ht 1.6 m (5' 3\")   Wt 91.6 kg (202 lb)   LMP  (LMP Unknown)   BMI 35.78 kg/m   Estimated body mass index is 35.78 kg/m  as calculated from the following:    Height as of this encounter: 1.6 m (5' 3\").    Weight as of this encounter: 91.6 kg (202 lb). .    "

## 2024-10-24 NOTE — PROGRESS NOTES
"[facility]  Gynecology Office Note    Assessment and Plan:   Sharri Elaine, 74 year old , presents for postmenopausal vaginal bleeding.     Postmenopausal vaginal bleeding  -Patient's recent ultrasound did thickened endometrial lining.   -If inadequate amount of cells were found on endometrial biopsy, I would recommend \"hysteroscopy, dilation and curettage, endometrial biopsy\" under anesthesia.   -If endometrial biopsy returned without any precancerous or cancerous cells, I would recommend \"hysteroscopy, dilation and curettage, endometrial biopsy\" under anesthesia.   -If endometrial biopsy returned with precancerous or cancerous cells, I will place gynecologic oncology referral.     Office Endometrial Biopsy Note  Informed consent was obtained after informed patient of risks of procedure, including minimal bleeding, low risk of infection, and discomfort.  Patient was placed in dorsolithotomy position. Using a medium David's speculum, the cervix was visualized. The cervix was prepped with Betadine swabs x3. A total of 10mL 1% lidocaine was injected as intracervical block. A single tooth tenaculum was applied to the anterior lip of the cervix. The endometrial pipelle was advanced through the cervix without difficulty and a sample collected. The tenaculum was removed from the cervix and the tenaculum site made hemostatic with pressure and silver nitrite.  Speculum was removed.  Patient tolerated the procedure well.     Prefers a call with result    Francoise Owusu MD  Obstetrics and Gynecology  [facility]   10/24/2024     -----------------------------------------------------------------------------------------------------------------------------------    HPI: Sharri Elaine, 74 year old , presents for postmenopausal vaginal bleeding.     Went through menopause in her early 50s. She had 9 months of recurrent spotting. Denied pelvic pain, weight change, " appetite change,  early satiety.     ROS: A 10 pt ROS was completed and found to be otherwise negative unless mentioned in the HPI.     OBHx: .  x2.  GYN Hx:   Abnormal Pap Smears: denied  Past Medical History:   Diagnosis Date    Acne rosacea     Anginas, unstable     CAD (coronary artery disease)     LAD stent x 2    Fibrocystic breast     Gastroesophageal reflux disease with esophagitis     Hepatitis     age 18    HTN (hypertension)     Iritis     14 YEARS OLD    Menopause     AN HRT    MI (myocardial infarction) (H)     non T wave MI    NONSPECIFIC MEDICAL HISTORY 2009    HEART MURMUR- NORMAL ECHO    Shingles 2017     Patient Active Problem List    Diagnosis Date Noted    Class 2 severe obesity with body mass index (BMI) of 35 to 39.9 with serious comorbidity (H) 2024     Priority: Medium    Chest pain 2016     Priority: Medium    Acute chest pain 2016     Priority: Medium    Rosacea 2010     Priority: Medium    Hypertension goal BP (blood pressure) < 140/90 2010     Priority: Medium     Goal <130/80      Hyperlipidemia with target LDL less than 100 2010     Priority: Medium     2011 - . I have asked this patient to obtain a fasting lipid profile tomorrow in Ghada Bermeo's office. She is complaining of some muscle weakness and/or achiness, especially in her legs. Perhaps simvastatin at 40 mg will need to be changed to something more potent such as Crestor. If she is having myalgias or myopathy, a change to a water soluble statin would be in order.   Diagnosis updated by automated process. Provider to review and confirm.      CAD (coronary artery disease)      Priority: Medium     Status post LAD stenting  for unstable angina. She had in-stent restenosis in the LAD with further stenting on 2010 when she developed recurrent angina and was hospitalized at [facility]. Coronary angiography demonstrated a 60%  circumflex ostial stenosis, a 95% proximal LAD in-stent restenosis along with a 50% ostial stenosis that was unchanged. The right coronary artery had a mild 10%-20% luminal narrowing but nothing significant. Her ejection fraction at that time was 45%. The in-stent restenosis was restented with an [place] drug-eluting stent.   Plavix was changed to Effient and the cardiologist, Dr. Torrez, felt that Effient should be continued at least 2 years or preferentially longer term. Nuclear stress test late summer 2011. We will consider a stress test earlier than that should the patient have symptoms.    Follow up with Cardiology in 6 months       Preinfarction syndrome (H)      Priority: Medium     (Problem list name updated by automated process. Provider to review and confirm.)       Past Surgical History:   Procedure Laterality Date    ANGIOGRAM  6/2010    angiogram  11/3/2010    in stent restenosis    LAPAROSCOPIC CHOLECYSTECTOMY N/A 6/27/2024    Procedure: CHOLECYSTECTOMY, ROBOT-ASSISTED, LAPAROSCOPIC, USING DA MELY XI;  Surgeon: Christopher Varela MD;  Location: [facility] OR    PVD STENTING  2010     x 2, LAD     Current Outpatient Medications   Medication Sig Dispense Refill    amLODIPine (NORVASC) 10 MG tablet Take 1 tablet (10 mg) by mouth daily 90 tablet 3    aspirin 81 MG EC tablet Take 81 mg by mouth daily      atorvastatin (LIPITOR) 40 MG tablet TAKE 1 TABLET DAILY 90 tablet 3    Calcium-Vitamin D (CALTRATE 600 PLUS-VIT D OR)       diphenhydrAMINE-acetaminophen (TYLENOL PM)  MG tablet Take 1 tablet by mouth at bedtime      esomeprazole (NEXIUM) 20 MG DR capsule Take 20 mg by mouth every morning (before breakfast) Take 30-60 minutes before eating.      losartan (COZAAR) 50 MG tablet Take 1 tablet (50 mg) by mouth 2 times daily 180 tablet 0    nitroGLYcerin (NITROSTAT) 0.4 MG sublingual tablet Place 1 tablet (0.4 mg) under the tongue every 5 minutes as needed for chest pain 25 tablet 1    ORDER FOR DME Blood pressure  "cuff 1 Device 0     No current facility-administered medications for this visit.     Allergies   Allergen Reactions    Carvedilol Nausea    Lisinopril Cough    Sulfa Antibiotics Rash     Social History: denied smoking or recreational drug use. Occasional alcohol use  Family History: mom's sister had uterine cancer. Denied FH of problems w/ anesthesia.  Family History   Problem Relation Age of Onset    Hypertension Mother     Heart Disease Mother         CHF    Alzheimer Disease Father     Hypertension Daughter     High cholesterol Daughter     Breast Cancer No family hx of      Physical Exam:   Vitals:    10/24/24 1323 10/24/24 1325   BP: (!) 167/78 (!) 154/73   BP Location: Right arm Right arm   Patient Position: Chair Chair   Cuff Size: Adult Regular Adult Regular   Pulse: 95 95   Resp: 18    Temp: 98.3  F (36.8  C)    TempSrc: Tympanic    Weight: 91.6 kg (202 lb)    Height: 1.6 m (5' 3\")       Estimated body mass index is 35.78 kg/m  as calculated from the following:    Height as of this encounter: 1.6 m (5' 3\").    Weight as of this encounter: 91.6 kg (202 lb).    General appearance: well-hydrated, A&O x 3, no apparent distress  Lungs: Equal expansion bilaterally, no accessory muscle use  Heart: No heaves or thrills.   Constitutional: See vitals  Abdomen: Soft, non-tender, non-distended. No rebound, rigidity, or guarding.  Neuro: CN II-XII grossly intact  Genitourinary:  External genitalia: no erythema, no lesions.   Urethral meatus appropriate location without lesions or prolapse  Urethra: No masses, tenderness, or scarring  Bladder no fullness, masses, or tenderness.  Anus and Perineum: Unremarkable, no visible lesions  Vagina: Normal, healthy pink mucosa without any lesions. Physiologic vaginal discharge.   Cervix: normal appearance, no cervical motion tenderness.   Uterus: normal size, shape and consistency.   Adnexa: no masses or tenderness bilaterally.  Narrative & Impression   US PELVIC TRANSABDOMINAL AND " TRANSVAGINAL 10/15/2024 3:10 PM     CLINICAL HISTORY: Vaginal lump; Vaginal bleeding.     TECHNIQUE: Transabdominal scans were performed. Endovaginal ultrasound  was performed to better visualize the adnexa.     COMPARISON: 2/10/2024     FINDINGS:     UTERUS: 6.5 x 3.0 x 3.4 cm. Normal in size and position with no  masses.     ENDOMETRIUM: 15 mm. The endometrium is abnormally thickened through  its upper portion.     RIGHT OVARY: 1.7 x 1.3 x 0.9 cm. Normal size and appearance.     LEFT OVARY: Not visualized, possibly due to positioning and overlying  bowel gas.     No significant free fluid.                                                                      IMPRESSION:  1.  Abnormally thickened upper endometrium measuring up to 15 mm. This  may be due to polyp or mass.  2.  The left ovary is not visualized, possibly due to positioning and  overlying bowel gas.

## 2024-10-25 NOTE — TELEPHONE ENCOUNTER
Name from pharmacy: LOSARTAN TABS 50MG          Will file in chart as: losartan (COZAAR) 50 MG tablet    Sig: Take 1 tablet (50 mg) by mouth 2 times daily.    Original sig: TAKE 1 TABLET TWICE A DAY    Disp: 180 tablet    Refills: 0 (Pharmacy requested: 3)    Start: 10/23/2024    Class: E-Prescribe    For: Hypertension goal BP (blood pressure) < 140/90, Hyperlipidemia with target LDL less than 100    Last ordered: 3 months ago (7/21/2024) by LUCA Gillespie MD    Last refill: 7/22/2024    Rx #: [id number]    Angiotensin-II Receptors Fddoua74/23/2024 01:53 PM   Protocol Details Most recent blood pressure under 140/90 in past 12 months    Medication is active on med list    Has GFR on file in past 12 months and most recent value is normal    Medication indicated for associated diagnosis    Recent (12 mo) or future (90days) visit within the authorizing provider's specialty    Patient is age 18 or older    No active pregnancy on record    Normal serum potassium on file in past 12 months    No positive pregnancy test in past 12 months      To be filled at: [facility] [address]        Patient saw Dr. Torres on 8/1/24. Patient on losartan 50 mg daily. Patient to follow up in 1 year. Prescription is for losartan 50 mg BID. Attempted to call patient to confirm what patient has been taking. Unable to reach patient.    Bianka Kapoor RN, BSN  Cardiology RN Care Coordinator   [facility]   Phone: [phone]  Fax: [phone] ([facility]) [phone] ([place])      Bianka Kapoor RN, BSN  Cardiology RN Care Coordinator   [facility]   Phone: [phone]  Fax: [phone] ([facility]) [phone] ([place])

## 2024-10-28 LAB
PATH REPORT.COMMENTS IMP SPEC: NORMAL
PATH REPORT.FINAL DX SPEC: NORMAL
PATH REPORT.GROSS SPEC: NORMAL
PATH REPORT.MICROSCOPIC SPEC OTHER STN: NORMAL
PATH REPORT.RELEVANT HX SPEC: NORMAL
PHOTO IMAGE: NORMAL

## 2024-10-28 RX ORDER — LOSARTAN POTASSIUM 50 MG/1
50 TABLET ORAL 2 TIMES DAILY
Qty: 180 TABLET | Refills: 3 | Status: SHIPPED | OUTPATIENT
Start: 2024-10-28

## 2024-10-28 NOTE — TELEPHONE ENCOUNTER
Signed Prescriptions:                        Disp   Refills    losartan (COZAAR) 50 MG tablet             180 ta*3        Sig: Take 1 tablet (50 mg) by mouth 2 times daily.  Authorizing Provider: LUCA MARROQUIN  Ordering User: TAMIKO VILLALOBOS Comprehensive Metabolic Panel:  Lab Results   Component Value Date     06/14/2024    POTASSIUM 5.0 06/14/2024    CHLORIDE 105 06/14/2024    CO2 26 06/14/2024    ANIONGAP 10 06/14/2024    GLC 91 06/14/2024    BUN 20.0 06/14/2024    CR 0.95 06/14/2024    GFRESTIMATED 62 06/14/2024    YASSINE 9.6 06/14/2024       Tamiko Villalobos, RN  Cardiology Care Coordinator  [facility]  [phone] option 1

## 2024-10-29 ENCOUNTER — PREP FOR PROCEDURE (OUTPATIENT)
Dept: OBGYN | Facility: CLINIC | Age: 74
End: 2024-10-29
Payer: COMMERCIAL

## 2024-10-29 ENCOUNTER — HOSPITAL ENCOUNTER (OUTPATIENT)
Facility: CLINIC | Age: 74
End: 2024-10-29
Attending: STUDENT IN AN ORGANIZED HEALTH CARE EDUCATION/TRAINING PROGRAM | Admitting: STUDENT IN AN ORGANIZED HEALTH CARE EDUCATION/TRAINING PROGRAM
Payer: MEDICARE

## 2024-10-29 ENCOUNTER — TELEPHONE (OUTPATIENT)
Dept: OBGYN | Facility: CLINIC | Age: 74
End: 2024-10-29
Payer: COMMERCIAL

## 2024-10-29 DIAGNOSIS — N95.0 POSTMENOPAUSAL VAGINAL BLEEDING: Primary | ICD-10-CM

## 2024-10-29 RX ORDER — ACETAMINOPHEN 325 MG/1
650 TABLET ORAL ONCE
Status: CANCELLED | OUTPATIENT
Start: 2024-10-29 | End: 2024-10-29

## 2024-10-29 NOTE — TELEPHONE ENCOUNTER
"[id number]  Sharri Elaine    You are now scheduled for surgery at [facility].  Below are the details for your surgery.  Please read the \"Preparing for Your Surgery\" instructions and let us know if you have any questions.    Type of surgery: HYSTEROSCOPY, WITH DILATION AND CURETTAGE OF UTERUS   Surgeon:  Francoise Owusu MD  Location of surgery: [facility] OR    Date of surgery: 11/18/24    Time: 8:30 am   Arrival Time: 7:00 am    Time can change, to be confirmed a couple of days prior by pre-op surgery nurse.    Pre-Op Appt Date:   Date: 11/13/2024 Status: Scheduled   Time: 10:20 AM Length: 40   Visit Type: PRE-OPERATIVE [2532] Copay: $15.00   Provider: Demi Martínez MD Department: [facility]     Post-Op Appt Date: not needed       Packet sent out: Yes  Pre-cert/Authorization completed:  TBD by Financial Securing Office.   MA Sterilization/Hysterectomy Acknowledgment Consent signed: Not Applicable    [facility]  [phone]    Fax: [phone]  Same Day Surgery [phone]  Fax: [phone]  [facility] [phone]    "

## 2024-11-05 ENCOUNTER — TELEPHONE (OUTPATIENT)
Dept: OBGYN | Facility: CLINIC | Age: 74
End: 2024-11-05
Payer: COMMERCIAL

## 2024-11-05 NOTE — TELEPHONE ENCOUNTER
Pt called asking to speak to Dr. Owusu regarding canceling her surgery on 11/18    Pt said that she passed a clot last week and since then, her bleeding has stopped and has not had any abdominal cramping.  She feels that the problem has resolved and wants to cancel surgery.      Sent message to Dr. Francoise Marroquin  Patient

## 2024-11-06 NOTE — TELEPHONE ENCOUNTER
Does not appear that this encounter was routed to provider.  Will route today but Dr. Owusu is off until next Monday.  Will also route to RN to see if they can advise/call pt if need be or if it has to wait until provider is back next week?    Please advise.    Thanks-    -Sharri De Jesus  [facility]

## 2024-11-06 NOTE — TELEPHONE ENCOUNTER
Await Dr. Owusu to advise.     Aminata SEGOVIA RN   [place] [facility]

## 2024-11-11 NOTE — TELEPHONE ENCOUNTER
Pt called again today would like to cancel her surgery on 11/18.  Does not want to reschedule at this time -states her symptoms are gone.  Surgery cancelled, SDS informed, outlook updated, clinic opened and FYI to Dr. Owusu.    -Sharri NAJERA Neal  [facility]   [phone]

## 2024-11-12 NOTE — TELEPHONE ENCOUNTER
Pt elected to wait till after the holidays and she will call us back to schedule surgery.     Postponed surgery orders til 1/6/25 -- will call pt then to see if she wants to schedule surgery.    Aminata Marroquin  Patient

## 2024-11-12 NOTE — CONFIDENTIAL NOTE
"Called pt 11/12/24 2:05 PM. I recommended proceeding to \"hysteroscopy, D&C\" even though her office EMB returned with benign pathology due to concerns for inadequate samples. Pt elected to wait till after the holidays and she will call use back to schedule surgery.     Francoise Owusu MD  Obstetrics and Gynecology  11/12/2024 2:09 PM    "

## 2025-03-25 DIAGNOSIS — E78.5 HYPERLIPIDEMIA WITH TARGET LDL LESS THAN 100: ICD-10-CM

## 2025-03-25 DIAGNOSIS — I10 HYPERTENSION GOAL BP (BLOOD PRESSURE) < 140/90: ICD-10-CM

## 2025-03-27 RX ORDER — ATORVASTATIN CALCIUM 40 MG/1
40 TABLET, FILM COATED ORAL DAILY
Qty: 90 TABLET | Refills: 1 | Status: SHIPPED | OUTPATIENT
Start: 2025-03-27

## 2025-03-27 NOTE — TELEPHONE ENCOUNTER
Last Written Prescription:     atorvastatin (LIPITOR) 40 MG tablet 90 tablet 3 4/2/2024 -- No   Sig - Route: TAKE 1 TABLET DAILY - Oral     ----------------------  Last Visit Date: 8/1/24  Future Visit Date: None  ----------------------  LDL Cholesterol Calculated   Date Value Ref Range Status   04/11/2024 93 <=100 mg/dL Final   09/06/2018 85 <100 mg/dL Final     Comment:     Desirable:       <100 mg/dl         Refill decision: Medication refilled per  Medication Refill in Ambulatory Care  policy.       Request from pharmacy:  Requested Prescriptions   Pending Prescriptions Disp Refills    atorvastatin (LIPITOR) 40 MG tablet [Pharmacy Med Name: ATORVASTATIN TABS 40MG] 90 tablet 3     Sig: TAKE 1 TABLET DAILY       Antihyperlipidemic agents Passed - 3/27/2025  1:50 PM        Passed - LDL on file in the past 12 months        Passed - Medication is active on med list and the sig matches. RN to manually verify dose and sig if red X/fail.     If the protocol passes (green check), you do not need to verify med dose and sig.    A prescription matches if they are the same clinical intention.    For Example: once daily and every morning are the same.    The protocol can not identify upper and lower case letters as matching and will fail.     For Example: Take 1 tablet (50 mg) by mouth daily     TAKE 1 TABLET (50 MG) BY MOUTH DAILY    For all fails (red x), verify dose and sig.    If the refill does match what is on file, the RN can still proceed to approve the refill request.       If they do not match, route to the appropriate provider.             Passed - Recent (12 mo) or future (90 days) visit within the authorizing provider's specialty     The patient must have completed an in-person or virtual visit within the past 12 months or has a future visit scheduled within the next 90 days with the authorizing provider s specialty.  Urgent care and e-visits do not qualify as an office visit for this protocol.          Passed -  Patient is age 18 years or older        Passed - No active pregnancy on record        Passed - No positive pregnancy test in past 12 mos

## 2025-03-30 ENCOUNTER — HEALTH MAINTENANCE LETTER (OUTPATIENT)
Age: 75
End: 2025-03-30

## 2025-06-03 ENCOUNTER — TELEPHONE (OUTPATIENT)
Dept: FAMILY MEDICINE | Facility: CLINIC | Age: 75
End: 2025-06-03
Payer: COMMERCIAL

## 2025-06-03 NOTE — TELEPHONE ENCOUNTER
Patient Quality Outreach    Patient is due for the following:   Hypertension -  Hypertension follow-up visit  Physical Annual Wellness Visit    Action(s) Taken:   Schedule a Annual Wellness Visit    Type of outreach:    Sent [employer] message.    Questions for provider review:    None         Mojgan Casas, [facility]  Chart routed to None.

## 2025-07-01 ENCOUNTER — OFFICE VISIT (OUTPATIENT)
Dept: FAMILY MEDICINE | Facility: CLINIC | Age: 75
End: 2025-07-01
Payer: COMMERCIAL

## 2025-07-01 VITALS
SYSTOLIC BLOOD PRESSURE: 148 MMHG | HEART RATE: 94 BPM | DIASTOLIC BLOOD PRESSURE: 72 MMHG | RESPIRATION RATE: 20 BRPM | TEMPERATURE: 98.4 F | WEIGHT: 204 LBS | HEIGHT: 64 IN | OXYGEN SATURATION: 96 % | BODY MASS INDEX: 34.83 KG/M2

## 2025-07-01 DIAGNOSIS — E66.01 CLASS 2 SEVERE OBESITY WITH BODY MASS INDEX (BMI) OF 35 TO 39.9 WITH SERIOUS COMORBIDITY (H): ICD-10-CM

## 2025-07-01 DIAGNOSIS — I10 HYPERTENSION GOAL BP (BLOOD PRESSURE) < 140/90: ICD-10-CM

## 2025-07-01 DIAGNOSIS — N63.22 LUMP IN UPPER INNER QUADRANT OF LEFT BREAST: Primary | ICD-10-CM

## 2025-07-01 DIAGNOSIS — Z13.6 SCREENING FOR CARDIOVASCULAR CONDITION: ICD-10-CM

## 2025-07-01 DIAGNOSIS — E66.812 CLASS 2 SEVERE OBESITY WITH BODY MASS INDEX (BMI) OF 35 TO 39.9 WITH SERIOUS COMORBIDITY (H): ICD-10-CM

## 2025-07-01 PROCEDURE — 99213 OFFICE O/P EST LOW 20 MIN: CPT | Performed by: FAMILY MEDICINE

## 2025-07-01 PROCEDURE — 1126F AMNT PAIN NOTED NONE PRSNT: CPT | Performed by: FAMILY MEDICINE

## 2025-07-01 PROCEDURE — 3077F SYST BP >= 140 MM HG: CPT | Performed by: FAMILY MEDICINE

## 2025-07-01 PROCEDURE — 3078F DIAST BP <80 MM HG: CPT | Performed by: FAMILY MEDICINE

## 2025-07-01 ASSESSMENT — PAIN SCALES - GENERAL: PAINLEVEL_OUTOF10: NO PAIN (0)

## 2025-07-01 NOTE — PROGRESS NOTES
"  Assessment & Plan   Problem List Items Addressed This Visit          Cardiovascular/Peripheral Vascular    Hypertension goal BP (blood pressure) < 140/90 - Primary    Relevant Orders    BASIC METABOLIC PANEL       Endocrine    Class 2 severe obesity with body mass index (BMI) of 35 to 39.9 with serious comorbidity (H)     Other Visit Diagnoses         Screening for cardiovascular condition        Relevant Orders    Lipid panel reflex to direct LDL Non-fasting      Lump in upper inner quadrant of left breast        Relevant Orders    MA Diagnostic Bilateral w/ Fabiano    US Breast Left Limited 1-3 Quadrants           75 yr old female here for a breast lump. Diagnostic mammogram ordered, ultrasound as well.   Patient will be notified of results, patient's blood pressure noted to be high.   She has an appointment for a physical in the next few weeks.       BMI  Estimated body mass index is 35.57 kg/m  as calculated from the following:    Height as of this encounter: 1.613 m (5' 3.5\").    Weight as of this encounter: 92.5 kg (204 lb).       Follow-up       Subjective   Sharri is a 75 year old, presenting for the following health issues:    75 yr old female here for a lump in her left breast. Felt a sharp pain in her breast a few weeks ago and the next day she felt a lump.   Patient reports that the lumps is tender. No change in the skin. No redness or warmth. She has had normal mammograms in the past. Denies any systemic symptoms.    Breast Mass (Noticed a lump on the left breast about 2 weeks ago.) and Imm/Inj (Discuss if she is due for a Covid injection.  Mentioned about updating her TDAP through her Pharmacy. )        7/1/2025     1:48 PM   Additional Questions   Roomed by Mojgan Casas CMA   Accompanied by Self     Chief Complaint   Patient presents with    Breast Mass     Noticed a lump on the left breast about 2 weeks ago.    Imm/Inj     Discuss if she is due for a Covid injection.  Mentioned about updating her " "TDAP through her Pharmacy.        History of Present Illness       Reason for visit:  Lump in my left breast  Symptom onset:  3-4 weeks ago  Symptoms include:  None  Symptom progression:  Staying the same  Had these symptoms before:  No  What makes it worse:  No  What makes it better:  No   She is taking medications regularly.          Breast Concern  Onset/Duration: 2 weeks  Description:   Location: front, middle area, left breast.  Pain or tenderness: YES- a little bit of tenderness.  Not pain.  Redness: She felt like a lightning feeling.  The next day there was a black and blue fozia. She thought this was a bug bite then noticed the lump.  The bruising is starting to fade.   Intensity: mild  Progression of Symptoms: improving-seems like the lump is getting smaller.  Accompanying Signs & Symptoms:  Any lumps in axillary region: No  Movable:  no  seems pretty hard.  Nipple discharge: none  Changes in the skin or nipple: bruising that is still there faintly.   On Hormone therapy:  no   Does it change with menstrual cycle: N/A  Previous history of similar problem: Had an US of the left breast 30 years ago.  Nothing showed up on that US.  First degree relative with breast cancer: None  Precipitating factors:           Worsened by: None  Alleviating factors:            Improved by: None  Therapies tried and outcome: None  No LMP recorded (lmp unknown). Patient is postmenopausal.        Review of Systems  CONSTITUTIONAL: NEGATIVE for fever, chills, change in weight  ENT/MOUTH: NEGATIVE for ear, mouth and throat problems  RESP: NEGATIVE for significant cough or SOB  BREAST: POSITIVE for  lump left breast  CV: NEGATIVE for chest pain, palpitations or peripheral edema      Objective    BP (!) 148/72 (BP Location: Right arm, Patient Position: Chair, Cuff Size: Adult Large)   Pulse 94   Temp 98.4  F (36.9  C) (Tympanic)   Resp 20   Ht 1.613 m (5' 3.5\")   Wt 92.5 kg (204 lb)   LMP  (LMP Unknown)   SpO2 96%   BMI 35.57 " kg/m    Body mass index is 35.57 kg/m .  Physical Exam   GENERAL: alert and no distress  EYES: Eyes grossly normal to inspection, PERRL and conjunctivae and sclerae normal  RESP: lungs clear to auscultation - no rales, rhonchi or wheezes  BREAST: normal without masses, tenderness or nipple discharge, no palpable axillary masses or adenopathy right breast,   mass left breast , and tenderness left soft tender lump movable in upper inner quadrant  CV: regular rate and rhythm, normal S1 S2, no S3 or S4, no murmur, click or rub, no peripheral edema             Signed Electronically by: Demi Martínez MD

## 2025-07-09 NOTE — PATIENT INSTRUCTIONS
Patient Education   Preventive Care Advice   This is general advice given by our system to help you stay healthy. However, your care team may have specific advice just for you. Please talk to your care team about your preventive care needs.  Nutrition  Eat 5 or more servings of fruits and vegetables each day.  Try wheat bread, brown rice and whole grain pasta (instead of white bread, rice, and pasta).  Get enough calcium and vitamin D. Check the label on foods and aim for 100% of the RDA (recommended daily allowance).  Lifestyle  Exercise at least 150 minutes each week  (30 minutes a day, 5 days a week).  Do muscle strengthening activities 2 days a week. These help control your weight and prevent disease.  No smoking.  Wear sunscreen to prevent skin cancer.  Have a dental exam and cleaning every 6 months.  Yearly exams  See your health care team every year to talk about:  Any changes in your health.  Any medicines your care team has prescribed.  Preventive care, family planning, and ways to prevent chronic diseases.  Shots (vaccines)   HPV shots (up to age 26), if you've never had them before.  Hepatitis B shots (up to age 59), if you've never had them before.  COVID-19 shot: Get this shot when it's due.  Flu shot: Get a flu shot every year.  Tetanus shot: Get a tetanus shot every 10 years.  Pneumococcal, hepatitis A, and RSV shots: Ask your care team if you need these based on your risk.  Shingles shot (for age 50 and up)  General health tests  Diabetes screening:  Starting at age 35, Get screened for diabetes at least every 3 years.  If you are younger than age 35, ask your care team if you should be screened for diabetes.  Cholesterol test: At age 39, start having a cholesterol test every 5 years, or more often if advised.  Bone density scan (DEXA): At age 50, ask your care team if you should have this scan for osteoporosis (brittle bones).  Hepatitis C: Get tested at least once in your life.  STIs (sexually  transmitted infections)  Before age 24: Ask your care team if you should be screened for STIs.  After age 24: Get screened for STIs if you're at risk. You are at risk for STIs (including HIV) if:  You are sexually active with more than one person.  You don't use condoms every time.  You or a partner was diagnosed with a sexually transmitted infection.  If you are at risk for HIV, ask about PrEP medicine to prevent HIV.  Get tested for HIV at least once in your life, whether you are at risk for HIV or not.  Cancer screening tests  Cervical cancer screening: If you have a cervix, begin getting regular cervical cancer screening tests starting at age 21.  Breast cancer scan (mammogram): If you've ever had breasts, begin having regular mammograms starting at age 40. This is a scan to check for breast cancer.  Colon cancer screening: It is important to start screening for colon cancer at age 45.  Have a colonoscopy test every 10 years (or more often if you're at risk) Or, ask your provider about stool tests like a FIT test every year or Cologuard test every 3 years.  To learn more about your testing options, visit:   .  For help making a decision, visit:   https://bit.ly/kc57486.  Prostate cancer screening test: If you have a prostate, ask your care team if a prostate cancer screening test (PSA) at age 55 is right for you.  Lung cancer screening: If you are a current or former smoker ages 50 to 80, ask your care team if ongoing lung cancer screenings are right for you.  For informational purposes only. Not to replace the advice of your health care provider. Copyright   2023 [employer]. All rights reserved. Clinically reviewed by the [facility]. [employer] 248727 - REV 01/24.

## 2025-07-10 ENCOUNTER — OFFICE VISIT (OUTPATIENT)
Dept: FAMILY MEDICINE | Facility: CLINIC | Age: 75
End: 2025-07-10
Payer: COMMERCIAL

## 2025-07-10 VITALS
HEIGHT: 64 IN | SYSTOLIC BLOOD PRESSURE: 148 MMHG | WEIGHT: 208 LBS | HEART RATE: 91 BPM | BODY MASS INDEX: 35.51 KG/M2 | RESPIRATION RATE: 16 BRPM | OXYGEN SATURATION: 98 % | TEMPERATURE: 98 F | DIASTOLIC BLOOD PRESSURE: 62 MMHG

## 2025-07-10 DIAGNOSIS — Z23 NEED FOR VACCINATION: ICD-10-CM

## 2025-07-10 DIAGNOSIS — I10 HYPERTENSION GOAL BP (BLOOD PRESSURE) < 140/90: ICD-10-CM

## 2025-07-10 DIAGNOSIS — R74.8 ELEVATED ALKALINE PHOSPHATASE LEVEL: ICD-10-CM

## 2025-07-10 DIAGNOSIS — Z72.89 OTHER PROBLEMS RELATED TO LIFESTYLE: ICD-10-CM

## 2025-07-10 DIAGNOSIS — L71.9 ROSACEA: ICD-10-CM

## 2025-07-10 DIAGNOSIS — E78.5 HYPERLIPIDEMIA WITH TARGET LDL LESS THAN 100: ICD-10-CM

## 2025-07-10 DIAGNOSIS — Z00.00 ENCOUNTER FOR MEDICARE ANNUAL WELLNESS EXAM: Primary | ICD-10-CM

## 2025-07-10 LAB
ALBUMIN SERPL BCG-MCNC: 4.2 G/DL (ref 3.5–5.2)
ALP SERPL-CCNC: 229 U/L (ref 40–150)
ALT SERPL W P-5'-P-CCNC: 20 U/L (ref 0–50)
ANION GAP SERPL CALCULATED.3IONS-SCNC: 11 MMOL/L (ref 7–15)
AST SERPL W P-5'-P-CCNC: 19 U/L (ref 0–45)
BILIRUB SERPL-MCNC: 0.5 MG/DL
BUN SERPL-MCNC: 13.6 MG/DL (ref 8–23)
CALCIUM SERPL-MCNC: 9.5 MG/DL (ref 8.8–10.4)
CHLORIDE SERPL-SCNC: 103 MMOL/L (ref 98–107)
CHOLEST SERPL-MCNC: 181 MG/DL
CREAT SERPL-MCNC: 0.84 MG/DL (ref 0.51–0.95)
EGFRCR SERPLBLD CKD-EPI 2021: 71 ML/MIN/1.73M2
FASTING STATUS PATIENT QL REPORTED: NO
FASTING STATUS PATIENT QL REPORTED: NO
GLUCOSE SERPL-MCNC: 96 MG/DL (ref 70–99)
HCO3 SERPL-SCNC: 25 MMOL/L (ref 22–29)
HDLC SERPL-MCNC: 63 MG/DL
LDLC SERPL CALC-MCNC: 96 MG/DL
NONHDLC SERPL-MCNC: 118 MG/DL
POTASSIUM SERPL-SCNC: 4.2 MMOL/L (ref 3.4–5.3)
PROT SERPL-MCNC: 8.2 G/DL (ref 6.4–8.3)
SODIUM SERPL-SCNC: 139 MMOL/L (ref 135–145)
TRIGL SERPL-MCNC: 109 MG/DL

## 2025-07-10 RX ORDER — MINOCYCLINE HYDROCHLORIDE 50 MG/1
50 TABLET ORAL 2 TIMES DAILY
Qty: 180 TABLET | Refills: 0 | Status: SHIPPED | OUTPATIENT
Start: 2025-07-10 | End: 2025-10-08

## 2025-07-10 SDOH — HEALTH STABILITY: PHYSICAL HEALTH: ON AVERAGE, HOW MANY DAYS PER WEEK DO YOU ENGAGE IN MODERATE TO STRENUOUS EXERCISE (LIKE A BRISK WALK)?: 2 DAYS

## 2025-07-10 ASSESSMENT — PAIN SCALES - GENERAL: PAINLEVEL_OUTOF10: NO PAIN (0)

## 2025-07-10 ASSESSMENT — SOCIAL DETERMINANTS OF HEALTH (SDOH): HOW OFTEN DO YOU GET TOGETHER WITH FRIENDS OR RELATIVES?: ONCE A WEEK

## 2025-07-10 NOTE — PROGRESS NOTES
"Preventive Care Visit  [facility]  Alicia Costa NP, Family Medicine  Jul 10, 2025    Assessment & Plan     Encounter for Medicare annual wellness exam  Patient advised on needed vaccinations and that she would need to go to pharmacy for Medicare to cover these.  She is also up-to-date on screenings. Clinical history addressed below.  I reviewed preventive health recommendations and advised follow-up in 1 year for annual Medicare wellness exam.  - PRIMARY CARE FOLLOW-UP SCHEDULING; Future  - REVIEW OF HEALTH MAINTENANCE PROTOCOL ORDERS    Need for vaccination  See note above.    Rosacea  Starting minocycline 1 tablet twice a day and refilled this for 3 months.  I did order comprehensive metabolic panel just to check liver function and kidney function prior to starting this.  Plan to follow-up in 3 months to see how things are going on the medication and if we need to continue this or if symptoms recur she can follow-up in clinic.  - minocycline (DYNACIN) 50 MG tablet; Take 1 tablet (50 mg) by mouth 2 times daily.  - Comprehensive metabolic panel (BMP + Alb, Alk Phos, ALT, AST, Total. Bili, TP); Future  - Comprehensive metabolic panel (BMP + Alb, Alk Phos, ALT, AST, Total. Bili, TP)    Hypertension goal BP (blood pressure) < 140/90  Stable followed by cardiology.    Hyperlipidemia with target LDL less than 100  Stable followed by cardiology.  Did order lipid today due to getting labs.  - Lipid panel reflex to direct LDL Non-fasting; Future  - Lipid panel reflex to direct LDL Non-fasting    Patient has been advised of split billing requirements and indicates understanding: Yes    BMI  Estimated body mass index is 36.27 kg/m  as calculated from the following:    Height as of this encounter: 1.613 m (5' 3.5\").    Weight as of this encounter: 94.3 kg (208 lb).   Weight management plan: Discussed healthy diet and exercise guidelines    Counseling  Appropriate preventive services were " addressed with this patient via screening, questionnaire, or discussion as appropriate for fall prevention, nutrition, physical activity, Tobacco-use cessation, social engagement, weight loss and cognition.  Checklist reviewing preventive services available has been given to the patient.  Reviewed patient's diet, addressing concerns and/or questions.   She is at risk for lack of exercise and has been provided with information to increase physical activity for the benefit of her well-being.   The patient reports drinking more than 3 alcoholic drinks per day and/or more than 7 drhnks per week. The patient was counseled and given information about possible harmful effects of excessive alcohol intake.The patient was provided with written information regarding signs of hearing loss.   Reviewed preventive health counseling, as reflected in patient instructions       Regular exercise       Healthy diet/nutrition       Vision screening       Osteoporosis prevention/bone health    Follow-up    Follow-up Visit   Expected date:  Jul 16, 2026 (Approximate)      Follow Up Appointment Details:     Follow-up with whom?: PCP    Follow-Up for what?: Medicare Wellness    Welcome or Annual?: Annual Wellness    How?: In Person                 Cait Harrell is a 75 year old, presenting for the following:  Wellness Visit        7/10/2025     2:28 PM   Additional Questions   Roomed by rmb   Accompanied by spouse         7/10/2025     2:28 PM   Patient Reported Additional Medications   Patient reports taking the following new medications none     HPI    Advance Care Planning    Patient states has Health Care Directive and will send to [employer].        7/10/2025   General Health   How would you rate your overall physical health? Good   Feel stress (tense, anxious, or unable to sleep) Not at all         7/10/2025   Nutrition   Diet: Regular (no restrictions)         7/10/2025   Exercise   Days per week of moderate/strenous  exercise 2 days   (!) EXERCISE CONCERN      7/10/2025   Social Factors   Frequency of gathering with friends or relatives Once a week   Worry food won't last until get money to buy more No   Food not last or not have enough money for food? No   Do you have housing? (Housing is defined as stable permanent housing and does not include staying outside in a car, in a tent, in an abandoned building, in an overnight shelter, or couch-surfing.) Yes   Are you worried about losing your housing? No   Lack of transportation? No   Unable to get utilities (heat,electricity)? No         7/10/2025   Fall Risk   Fallen 2 or more times in the past year? No   Trouble with walking or balance? Yes   Gait Speed Test (Document in seconds) 4.5   Gait Speed Test Interpretation Less than or equal to 5.00 seconds - PASS         7/10/2025   Activities of Daily Living- Home Safety   Needs help with the following daily activites None of the above   Safety concerns in the home None of the above         7/10/2025   Dental   Dentist two times every year? Yes         7/10/2025   Hearing Screening   Hearing concerns? (!) TROUBLE UNDERSTANDING SOFT OR WHISPERED SPEECH.   Would you like a referral for hearing testing? No         7/10/2025   Driving Risk Screening   Patient/family members have concerns about driving No         7/10/2025   General Alertness/Fatigue Screening   Have you been more tired than usual lately? No         7/10/2025   Urinary Incontinence Screening   Bothered by leaking urine in past 6 months No         Today's PHQ-2 Score:       7/10/2025     2:12 PM   PHQ-2 ( 1999 [employer])   Q1: Little interest or pleasure in doing things 0   Q2: Feeling down, depressed or hopeless 0   PHQ-2 Score 0    Q1: Little interest or pleasure in doing things Not at all   Q2: Feeling down, depressed or hopeless Not at all   PHQ-2 Score 0       Patient-reported           7/10/2025   Substance Use   Alcohol more than 3/day or more than 7/wk Yes   How often  do you have a drink containing alcohol 4 or more times a week   How many alcohol drinks on typical day 1 or 2   How often do you have 5+ drinks at one occasion Never   Audit 2/3 Score 0   How often not able to stop drinking once started Never   How often failed to do what normally expected Never   How often needed first drink in am after a heavy drinking session Never   How often feeling of guilt or remorse after drinking Never   How often unable to remember what happened the night before Never   Have you or someone else been injured because of your drinking No   Has anyone been concerned or suggested you cut down on drinking No   TOTAL SCORE - AUDIT 4   Do you have a current opioid prescription? No   How severe/bad is pain from 1 to 10? 1/10   Do you use any other substances recreationally? No     Social History     Tobacco Use    Smoking status: Never    Smokeless tobacco: Never    Tobacco comments:     never smoker; non-smoking household   Vaping Use    Vaping status: Never Used   Substance Use Topics    Alcohol use: Yes     Comment: 1 glass of wine almost every day    Drug use: No     Comment: never        Mammogram Screening - After age 74- determine frequency with patient based on health status, life expectancy and patient goals    ASCVD Risk   The ASCVD Risk score (Pepper IVEY, et al., 2019) failed to calculate for the following reasons:    Risk score cannot be calculated because patient has a medical history suggesting prior/existing ASCVD    Reviewed and updated as needed this visit by Provider   Tobacco  Allergies  Meds  Problems  Med Hx  Surg Hx  Fam Hx  Soc   Hx Sexual Activity          Past Medical History:   Diagnosis Date    Acne rosacea     [place], unstable 2010    CAD (coronary artery disease) 2010    LAD stent x 2    Fibrocystic breast     Gastroesophageal reflux disease with esophagitis     Hepatitis     age 18    HTN (hypertension)     Iritis     14 YEARS OLD    Menopause     AN  HRT    MI (myocardial infarction) (H)     non T wave MI    NONSPECIFIC MEDICAL HISTORY 07/14/2009    HEART MURMUR- NORMAL ECHO    Shingles 05/2017     Past Surgical History:   Procedure Laterality Date    ANGIOGRAM  6/2010    angiogram  11/3/2010    in stent restenosis    LAPAROSCOPIC CHOLECYSTECTOMY N/A 6/27/2024    Procedure: CHOLECYSTECTOMY, ROBOT-ASSISTED, LAPAROSCOPIC, USING DA MELY XI;  Surgeon: Christopher Varela MD;  Location: [facility] OR    PVD STENTING  2010     x 2, LAD     Labs reviewed in EPIC  BP Readings from Last 3 Encounters:   07/10/25 (!) 148/62   07/01/25 (!) 148/72   10/24/24 (!) 154/73    Wt Readings from Last 3 Encounters:   07/10/25 94.3 kg (208 lb)   07/01/25 92.5 kg (204 lb)   10/24/24 91.6 kg (202 lb)                  Patient Active Problem List   Diagnosis    Hypertension goal BP (blood pressure) < 140/90    Hyperlipidemia with target LDL less than 100    CAD (coronary artery disease)    Rosacea    Class 2 severe obesity with body mass index (BMI) of 35 to 39.9 with serious comorbidity (H)    Elevated alkaline phosphatase level     Past Surgical History:   Procedure Laterality Date    ANGIOGRAM  6/2010    angiogram  11/3/2010    in stent restenosis    LAPAROSCOPIC CHOLECYSTECTOMY N/A 6/27/2024    Procedure: CHOLECYSTECTOMY, ROBOT-ASSISTED, LAPAROSCOPIC, USING DA MELY XI;  Surgeon: Christopher Varela MD;  Location: [facility] OR    PVD STENTING  2010     x 2, LAD       Social History     Tobacco Use    Smoking status: Never    Smokeless tobacco: Never    Tobacco comments:     never smoker; non-smoking household   Substance Use Topics    Alcohol use: Yes     Comment: 1 glass of wine almost every day     Family History   Problem Relation Age of Onset    Hypertension Mother     Heart Disease Mother         CHF    Alzheimer Disease Father     Lung Cancer Brother     Hypertension Daughter     High cholesterol Daughter     Breast Cancer No family hx of          Current Outpatient Medications   Medication Sig  Dispense Refill    amLODIPine (NORVASC) 10 MG tablet Take 1 tablet (10 mg) by mouth daily 90 tablet 3    aspirin 81 MG EC tablet Take 81 mg by mouth daily      atorvastatin (LIPITOR) 40 MG tablet TAKE 1 TABLET DAILY 90 tablet 1    diphenhydrAMINE-acetaminophen (TYLENOL PM)  MG tablet Take 1 tablet by mouth at bedtime      esomeprazole (NEXIUM) 20 MG DR capsule Take 20 mg by mouth every morning (before breakfast) Take 30-60 minutes before eating.      losartan (COZAAR) 50 MG tablet Take 1 tablet (50 mg) by mouth 2 times daily. 180 tablet 3    minocycline (DYNACIN) 50 MG tablet Take 1 tablet (50 mg) by mouth 2 times daily. 180 tablet 0    ORDER FOR DME Blood pressure cuff 1 Device 0    nitroGLYcerin (NITROSTAT) 0.4 MG sublingual tablet Place 1 tablet (0.4 mg) under the tongue every 5 minutes as needed for chest pain (Patient not taking: Reported on 7/10/2025) 25 tablet 1     Allergies   Allergen Reactions    Carvedilol Nausea    Lisinopril Cough    Sulfa Antibiotics Rash     Recent Labs   Lab Test 06/14/24  1150 04/11/24  1003 02/10/24  0958 10/12/23  1447 07/12/23  1200 10/28/22  0952 10/28/22  0952 05/29/19  0949 09/06/18  0935   LDL  --  93  --   --   --   --  95  --  85   HDL  --  62  --   --   --   --  61  --  55   TRIG  --  90  --   --   --   --  96  --  91   ALT  --   --  24 24 21   < > 21 25  --    CR 0.95  --  0.85 1.00* 0.91   < > 0.85 0.90 0.99   GFRESTIMATED 62  --  71 58* 65   < > 71 65 55*   GFRESTBLACK  --   --   --   --   --   --   --  75 67   POTASSIUM 5.0  --  3.7 3.9 4.3   < > 4.6 4.4 3.9    < > = values in this interval not displayed.      Current providers sharing in care for this patient include:  Patient Care Team:  Demi Martínez MD as PCP - General (Family Medicine)  LUCA Gillespie MD as Assigned Heart and Vascular Provider  [facility] Christopher as Assigned Surgical Provider  Demi Martínez MD as Assigned PCP  Linda Martinez PA-C as Assigned Musculoskeletal  Provider  Laverne Whelan MD as MD (OB/Gyn)  Francoise Owusu MD as Assigned OBGYN Provider    The following health maintenance items are reviewed in Epic and correct as of today:  Health Maintenance   Topic Date Due    DTAP/TDAP/TD VACCINE (2 - Td or Tdap) 07/22/2020    COVID-19 VACCINE (8 - 2024-25 season) 04/02/2025    LIPID  04/11/2025    BMP  06/14/2025    INFLUENZA VACCINE (1) 09/01/2025    MEDICARE ANNUAL WELLNESS VISIT  07/10/2026    ANNUAL REVIEW OF HM ORDERS  07/10/2026    FALL RISK ASSESSMENT  07/10/2026    DIABETES SCREENING  06/14/2027    ADVANCE CARE PLANNING  07/10/2030    DEXA  12/06/2037    HEPATITIS C SCREENING  Completed    PHQ-2 (once per calendar year)  Completed    PNEUMOCOCCAL VACCINE 50+ YEARS  Completed    ZOSTER VACCINE  Completed    RSV VACCINE  Completed    HPV VACCINE  Aged Out    MENINGITIS VACCINE  Aged Out    MAMMO SCREENING  Discontinued    COLORECTAL CANCER SCREENING  Discontinued         Review of Systems  CONSTITUTIONAL: NEGATIVE for fever, chills, change in weight  INTEGUMENTARY/SKIN: POSITIVE for capillary bursting in the skin and has dermatology appointment set up for skin check  EYES: NEGATIVE for vision changes or irritation  ENT/MOUTH: NEGATIVE for ear, mouth and throat problems  RESP: NEGATIVE for significant cough or SOB  BREAST: POSITIVE for NEGATIVE and lump on top of left nipple; has mammogram ordered  CV: POSITIVE for hx of CAD and swelling in lower extremities at times.  GI: NEGATIVE for nausea, abdominal pain, heartburn, or change in bowel habits  : NEGATIVE for frequency, dysuria, or hematuria  MUSCULOSKELETAL:POSITIVE  for left hip arthritis but improves with movement  NEURO: NEGATIVE for weakness, dizziness or paresthesias  ENDOCRINE: NEGATIVE for temperature intolerance, skin/hair changes  HEME/ALLERGY/IMMUNE: POSITIVE  for bruises easily due to thin skin  PSYCHIATRIC: NEGATIVE for changes in mood or affect     Objective    Exam  BP (!) 148/62   Pulse 91   Temp 98  " F (36.7  C) (Tympanic)   Resp 16   Ht 1.613 m (5' 3.5\")   Wt 94.3 kg (208 lb)   LMP  (LMP Unknown)   SpO2 98%   BMI 36.27 kg/m     Estimated body mass index is 36.27 kg/m  as calculated from the following:    Height as of this encounter: 1.613 m (5' 3.5\").    Weight as of this encounter: 94.3 kg (208 lb).    Physical Exam  GENERAL: alert and no distress  EYES: Eyes grossly normal to inspection, PERRL and conjunctivae and sclerae normal  HENT: ear canals and TM's normal, nose and mouth without ulcers or lesions  NECK: no adenopathy, no asymmetry, masses, or scars  RESP: lungs clear to auscultation - no rales, rhonchi or wheezes  CV: regular rate and rhythm, normal S1 S2, no S3 or S4, no murmur, click or rub, no peripheral edema  ABDOMEN: soft, nontender, no hepatosplenomegaly, no masses and bowel sounds normal  MS: no gross musculoskeletal defects noted, no edema  SKIN: no suspicious lesions or rashes  NEURO: Normal strength and tone, mentation intact and speech normal  PSYCH: mentation appears normal, affect normal/bright  LYMPH: normal ant/post cervical, supraclavicular nodes         7/10/2025   Mini Cog   Clock Draw Score 2 Normal   3 Item Recall 3 objects recalled   Mini Cog Total Score 5     Signed Electronically by: Alicia Costa NP    "

## 2025-07-24 ENCOUNTER — LAB (OUTPATIENT)
Dept: LAB | Facility: CLINIC | Age: 75
End: 2025-07-24
Payer: COMMERCIAL

## 2025-07-24 DIAGNOSIS — Z13.6 SCREENING FOR CARDIOVASCULAR CONDITION: ICD-10-CM

## 2025-07-24 DIAGNOSIS — I10 HYPERTENSION GOAL BP (BLOOD PRESSURE) < 140/90: ICD-10-CM

## 2025-07-24 DIAGNOSIS — Z72.89 OTHER PROBLEMS RELATED TO LIFESTYLE: ICD-10-CM

## 2025-07-24 DIAGNOSIS — R74.8 ELEVATED ALKALINE PHOSPHATASE LEVEL: ICD-10-CM

## 2025-07-24 LAB
ANION GAP SERPL CALCULATED.3IONS-SCNC: 11 MMOL/L (ref 7–15)
BUN SERPL-MCNC: 16 MG/DL (ref 8–23)
CALCIUM SERPL-MCNC: 9.4 MG/DL (ref 8.8–10.4)
CHLORIDE SERPL-SCNC: 103 MMOL/L (ref 98–107)
CHOLEST SERPL-MCNC: 184 MG/DL
CREAT SERPL-MCNC: 0.98 MG/DL (ref 0.51–0.95)
EGFRCR SERPLBLD CKD-EPI 2021: 59 ML/MIN/1.73M2
FASTING STATUS PATIENT QL REPORTED: NO
FASTING STATUS PATIENT QL REPORTED: NO
GLUCOSE SERPL-MCNC: 85 MG/DL (ref 70–99)
HCO3 SERPL-SCNC: 26 MMOL/L (ref 22–29)
HDLC SERPL-MCNC: 55 MG/DL
LDLC SERPL CALC-MCNC: 98 MG/DL
NONHDLC SERPL-MCNC: 129 MG/DL
POTASSIUM SERPL-SCNC: 4.3 MMOL/L (ref 3.4–5.3)
PTH-INTACT SERPL-MCNC: 75 PG/ML (ref 15–65)
SODIUM SERPL-SCNC: 140 MMOL/L (ref 135–145)
TRIGL SERPL-MCNC: 154 MG/DL

## 2025-08-04 ENCOUNTER — TELEPHONE (OUTPATIENT)
Dept: CARDIOLOGY | Facility: CLINIC | Age: 75
End: 2025-08-04
Payer: COMMERCIAL

## 2025-08-14 ENCOUNTER — OFFICE VISIT (OUTPATIENT)
Dept: CARDIOLOGY | Facility: CLINIC | Age: 75
End: 2025-08-14
Payer: COMMERCIAL

## 2025-08-14 VITALS
BODY MASS INDEX: 36.09 KG/M2 | OXYGEN SATURATION: 97 % | WEIGHT: 207 LBS | DIASTOLIC BLOOD PRESSURE: 76 MMHG | HEART RATE: 94 BPM | SYSTOLIC BLOOD PRESSURE: 129 MMHG

## 2025-08-14 DIAGNOSIS — E78.5 HYPERLIPIDEMIA WITH TARGET LDL LESS THAN 100: ICD-10-CM

## 2025-08-14 DIAGNOSIS — Z98.61 STATUS POST PERCUTANEOUS TRANSLUMINAL CORONARY ANGIOPLASTY: ICD-10-CM

## 2025-08-14 DIAGNOSIS — I10 HYPERTENSION GOAL BP (BLOOD PRESSURE) < 140/90: ICD-10-CM

## 2025-08-14 RX ORDER — ATORVASTATIN CALCIUM 40 MG/1
40 TABLET, FILM COATED ORAL DAILY
Qty: 90 TABLET | Refills: 3 | Status: SHIPPED | OUTPATIENT
Start: 2025-08-14

## 2025-08-14 RX ORDER — AMLODIPINE BESYLATE 10 MG/1
10 TABLET ORAL DAILY
Qty: 90 TABLET | Refills: 3 | Status: SHIPPED | OUTPATIENT
Start: 2025-08-14

## 2025-08-14 RX ORDER — LOSARTAN POTASSIUM 50 MG/1
50 TABLET ORAL 2 TIMES DAILY
Qty: 180 TABLET | Refills: 3 | Status: SHIPPED | OUTPATIENT
Start: 2025-08-14

## 2025-08-18 ENCOUNTER — OFFICE VISIT (OUTPATIENT)
Dept: FAMILY MEDICINE | Facility: CLINIC | Age: 75
End: 2025-08-18
Payer: COMMERCIAL

## 2025-08-18 VITALS
TEMPERATURE: 97.3 F | WEIGHT: 206.7 LBS | HEART RATE: 89 BPM | RESPIRATION RATE: 20 BRPM | SYSTOLIC BLOOD PRESSURE: 112 MMHG | DIASTOLIC BLOOD PRESSURE: 70 MMHG | OXYGEN SATURATION: 97 % | HEIGHT: 64 IN | BODY MASS INDEX: 35.29 KG/M2

## 2025-08-18 DIAGNOSIS — S39.012A STRAIN OF MUSCLE, FASCIA AND TENDON OF LOWER BACK, INITIAL ENCOUNTER: Primary | ICD-10-CM

## 2025-08-18 PROCEDURE — 1126F AMNT PAIN NOTED NONE PRSNT: CPT | Performed by: FAMILY MEDICINE

## 2025-08-18 PROCEDURE — 3074F SYST BP LT 130 MM HG: CPT | Performed by: FAMILY MEDICINE

## 2025-08-18 PROCEDURE — 3078F DIAST BP <80 MM HG: CPT | Performed by: FAMILY MEDICINE

## 2025-08-18 PROCEDURE — 99213 OFFICE O/P EST LOW 20 MIN: CPT | Performed by: FAMILY MEDICINE

## 2025-08-18 RX ORDER — CYCLOBENZAPRINE HCL 5 MG
5 TABLET ORAL 3 TIMES DAILY PRN
Qty: 30 TABLET | Refills: 0 | Status: SHIPPED | OUTPATIENT
Start: 2025-08-18

## 2025-08-18 ASSESSMENT — PAIN SCALES - GENERAL: PAINLEVEL_OUTOF10: NO PAIN (0)

## 2025-08-27 ENCOUNTER — OFFICE VISIT (OUTPATIENT)
Dept: DERMATOLOGY | Facility: CLINIC | Age: 75
End: 2025-08-27
Payer: COMMERCIAL

## 2025-08-27 DIAGNOSIS — L57.0 ACTINIC KERATOSIS: ICD-10-CM

## 2025-08-27 DIAGNOSIS — D18.01 CHERRY ANGIOMA: ICD-10-CM

## 2025-08-27 DIAGNOSIS — L82.1 SEBORRHEIC KERATOSIS: Primary | ICD-10-CM

## 2025-08-27 DIAGNOSIS — L81.4 LENTIGO: ICD-10-CM

## 2025-08-27 DIAGNOSIS — D48.5 NEOPLASM OF UNCERTAIN BEHAVIOR OF SKIN: ICD-10-CM

## 2025-08-27 PROCEDURE — 17003 DESTRUCT PREMALG LES 2-14: CPT | Performed by: PHYSICIAN ASSISTANT

## 2025-08-27 PROCEDURE — 99203 OFFICE O/P NEW LOW 30 MIN: CPT | Mod: 25 | Performed by: PHYSICIAN ASSISTANT

## 2025-08-27 PROCEDURE — 17000 DESTRUCT PREMALG LESION: CPT | Mod: 59 | Performed by: PHYSICIAN ASSISTANT

## 2025-08-27 PROCEDURE — 11102 TANGNTL BX SKIN SINGLE LES: CPT | Performed by: PHYSICIAN ASSISTANT

## 2025-09-02 ENCOUNTER — TELEPHONE (OUTPATIENT)
Dept: DERMATOLOGY | Facility: CLINIC | Age: 75
End: 2025-09-02
Payer: COMMERCIAL

## (undated) DEVICE — ENDO TROCAR FIRST ENTRY KII FIOS Z-THRD 05X100MM CTF03

## (undated) DEVICE — KIT PATIENT POSITIONING PIGAZZI LATEX FREE 40580

## (undated) DEVICE — DEVICE SUTURE PASSER 14GA WECK EFX EFXSP2

## (undated) DEVICE — DAVINCI XI DRAPE ARM 470015

## (undated) DEVICE — ENDO POUCH UNIVERSAL RETRIEVAL SYSTEM INZII 5MM CD003

## (undated) DEVICE — SU VICRYL 0 TIE 54" UND J287G

## (undated) DEVICE — DAVINCI XI ESU FORCE BIPOLAR 8MM 471405

## (undated) DEVICE — SU DERMABOND ADVANCED .7ML DNX12

## (undated) DEVICE — GOWN LG DISP 9515

## (undated) DEVICE — DRAPE TIBURON GENERAL ENDOSCOPY 9458

## (undated) DEVICE — DAVINCI XI FCP CADIERE 8MM ENDOWRIST 471049

## (undated) DEVICE — Device

## (undated) DEVICE — DAVINCI XI CLIP APPLIER MED HEM-O-LOK GREEN 470327

## (undated) DEVICE — DAVINCI XI DRAPE COLUMN 470341

## (undated) DEVICE — GOWN XLG DISP 9545

## (undated) DEVICE — DAVINCI XI SEAL UNIVERSAL 5-12MM 470500

## (undated) DEVICE — DAVINCI XI OBTURATOR BLADELESS 8MM 470359

## (undated) DEVICE — ANTIFOG SOLUTION SEE SHARP 150M TROCAR SWABS 30978

## (undated) DEVICE — ESU HOLSTER PLASTIC DISP E2400

## (undated) DEVICE — GLOVE BIOGEL PI MICRO INDICATOR UNDERGLOVE SZ 6.0 48960

## (undated) DEVICE — FILTER LAPROSHIELD SMOKE EVAC LSF1

## (undated) DEVICE — CLIP ENDO HEMO-LOC GREEN MED/LG 544230

## (undated) DEVICE — PREP CHLORAPREP 26ML TINTED ORANGE  260815

## (undated) DEVICE — SU MONOCRYL 4-0 PS-2 18" UND Y496G

## (undated) DEVICE — GLOVE BIOGEL PI MICRO SZ 5.5 48555

## (undated) DEVICE — SYR 30ML SLIP TIP W/O NDL 302833

## (undated) DEVICE — STOCKING SLEEVE COMPRESSION CALF MED

## (undated) DEVICE — SUCTION MANIFOLD NEPTUNE 2 SYS 4 PORT 0702-020-000

## (undated) DEVICE — DAVINCI XI CAUTERY HOOK 470183

## (undated) RX ORDER — OXYCODONE HYDROCHLORIDE 5 MG/1
TABLET ORAL
Status: DISPENSED
Start: 2024-06-27

## (undated) RX ORDER — HYDROMORPHONE HCL IN WATER/PF 6 MG/30 ML
PATIENT CONTROLLED ANALGESIA SYRINGE INTRAVENOUS
Status: DISPENSED
Start: 2024-06-27

## (undated) RX ORDER — ACETAMINOPHEN 325 MG/1
TABLET ORAL
Status: DISPENSED
Start: 2024-06-27

## (undated) RX ORDER — PROPOFOL 10 MG/ML
INJECTION, EMULSION INTRAVENOUS
Status: DISPENSED
Start: 2024-06-27

## (undated) RX ORDER — ONDANSETRON 2 MG/ML
INJECTION INTRAMUSCULAR; INTRAVENOUS
Status: DISPENSED
Start: 2024-06-27

## (undated) RX ORDER — FENTANYL CITRATE 50 UG/ML
INJECTION, SOLUTION INTRAMUSCULAR; INTRAVENOUS
Status: DISPENSED
Start: 2024-06-27

## (undated) RX ORDER — BUPIVACAINE HYDROCHLORIDE AND EPINEPHRINE 2.5; 5 MG/ML; UG/ML
INJECTION, SOLUTION EPIDURAL; INFILTRATION; INTRACAUDAL; PERINEURAL
Status: DISPENSED
Start: 2024-06-27

## (undated) RX ORDER — DEXAMETHASONE SODIUM PHOSPHATE 4 MG/ML
INJECTION, SOLUTION INTRA-ARTICULAR; INTRALESIONAL; INTRAMUSCULAR; INTRAVENOUS; SOFT TISSUE
Status: DISPENSED
Start: 2024-06-27

## (undated) RX ORDER — HEPARIN SODIUM 5000 [USP'U]/.5ML
INJECTION, SOLUTION INTRAVENOUS; SUBCUTANEOUS
Status: DISPENSED
Start: 2024-06-27

## (undated) RX ORDER — CEFAZOLIN SODIUM/WATER 2 G/20 ML
SYRINGE (ML) INTRAVENOUS
Status: DISPENSED
Start: 2024-06-27